# Patient Record
Sex: MALE | Race: ASIAN | NOT HISPANIC OR LATINO
[De-identification: names, ages, dates, MRNs, and addresses within clinical notes are randomized per-mention and may not be internally consistent; named-entity substitution may affect disease eponyms.]

---

## 2022-04-29 ENCOUNTER — RESULT REVIEW (OUTPATIENT)
Age: 67
End: 2022-04-29

## 2022-04-29 ENCOUNTER — OUTPATIENT (OUTPATIENT)
Dept: OUTPATIENT SERVICES | Facility: HOSPITAL | Age: 67
LOS: 1 days | End: 2022-04-29
Payer: MEDICARE

## 2022-04-29 ENCOUNTER — APPOINTMENT (OUTPATIENT)
Dept: ULTRASOUND IMAGING | Facility: CLINIC | Age: 67
End: 2022-04-29
Payer: MEDICARE

## 2022-04-29 DIAGNOSIS — Z00.8 ENCOUNTER FOR OTHER GENERAL EXAMINATION: ICD-10-CM

## 2022-04-29 PROCEDURE — 76700 US EXAM ABDOM COMPLETE: CPT

## 2022-04-29 PROCEDURE — 76700 US EXAM ABDOM COMPLETE: CPT | Mod: 26

## 2022-06-12 ENCOUNTER — EMERGENCY (EMERGENCY)
Facility: HOSPITAL | Age: 67
LOS: 1 days | Discharge: HOME | End: 2022-06-12
Attending: EMERGENCY MEDICINE | Admitting: EMERGENCY MEDICINE
Payer: COMMERCIAL

## 2022-06-12 VITALS
TEMPERATURE: 98 F | DIASTOLIC BLOOD PRESSURE: 76 MMHG | WEIGHT: 143.08 LBS | SYSTOLIC BLOOD PRESSURE: 130 MMHG | RESPIRATION RATE: 18 BRPM | HEART RATE: 76 BPM

## 2022-06-12 VITALS
TEMPERATURE: 98 F | RESPIRATION RATE: 15 BRPM | SYSTOLIC BLOOD PRESSURE: 135 MMHG | DIASTOLIC BLOOD PRESSURE: 70 MMHG | HEART RATE: 73 BPM | OXYGEN SATURATION: 100 %

## 2022-06-12 DIAGNOSIS — K80.50 CALCULUS OF BILE DUCT WITHOUT CHOLANGITIS OR CHOLECYSTITIS WITHOUT OBSTRUCTION: ICD-10-CM

## 2022-06-12 LAB
ALBUMIN SERPL ELPH-MCNC: 3.9 G/DL — SIGNIFICANT CHANGE UP (ref 3.3–5)
ALP SERPL-CCNC: 87 U/L — SIGNIFICANT CHANGE UP (ref 40–120)
ALT FLD-CCNC: 27 U/L — SIGNIFICANT CHANGE UP (ref 12–78)
ANION GAP SERPL CALC-SCNC: 9 MMOL/L — SIGNIFICANT CHANGE UP (ref 5–17)
APTT BLD: 30.8 SEC — SIGNIFICANT CHANGE UP (ref 27.5–35.5)
AST SERPL-CCNC: 24 U/L — SIGNIFICANT CHANGE UP (ref 15–37)
BASOPHILS # BLD AUTO: 0.04 K/UL — SIGNIFICANT CHANGE UP (ref 0–0.2)
BASOPHILS NFR BLD AUTO: 0.4 % — SIGNIFICANT CHANGE UP (ref 0–2)
BILIRUB SERPL-MCNC: 0.5 MG/DL — SIGNIFICANT CHANGE UP (ref 0.2–1.2)
BUN SERPL-MCNC: 14 MG/DL — SIGNIFICANT CHANGE UP (ref 7–23)
CALCIUM SERPL-MCNC: 9.3 MG/DL — SIGNIFICANT CHANGE UP (ref 8.5–10.1)
CHLORIDE SERPL-SCNC: 96 MMOL/L — SIGNIFICANT CHANGE UP (ref 96–108)
CO2 SERPL-SCNC: 26 MMOL/L — SIGNIFICANT CHANGE UP (ref 22–31)
CREAT SERPL-MCNC: 0.87 MG/DL — SIGNIFICANT CHANGE UP (ref 0.5–1.3)
EGFR: 96 ML/MIN/1.73M2 — SIGNIFICANT CHANGE UP
EOSINOPHIL # BLD AUTO: 0.02 K/UL — SIGNIFICANT CHANGE UP (ref 0–0.5)
EOSINOPHIL NFR BLD AUTO: 0.2 % — SIGNIFICANT CHANGE UP (ref 0–6)
GLUCOSE SERPL-MCNC: 147 MG/DL — HIGH (ref 70–99)
HCT VFR BLD CALC: 38.7 % — LOW (ref 39–50)
HGB BLD-MCNC: 13.9 G/DL — SIGNIFICANT CHANGE UP (ref 13–17)
IMM GRANULOCYTES NFR BLD AUTO: 0.4 % — SIGNIFICANT CHANGE UP (ref 0–1.5)
INR BLD: 1.14 RATIO — SIGNIFICANT CHANGE UP (ref 0.88–1.16)
LIDOCAIN IGE QN: 172 U/L — SIGNIFICANT CHANGE UP (ref 73–393)
LYMPHOCYTES # BLD AUTO: 1.03 K/UL — SIGNIFICANT CHANGE UP (ref 1–3.3)
LYMPHOCYTES # BLD AUTO: 10.2 % — LOW (ref 13–44)
MCHC RBC-ENTMCNC: 31.7 PG — SIGNIFICANT CHANGE UP (ref 27–34)
MCHC RBC-ENTMCNC: 35.9 GM/DL — SIGNIFICANT CHANGE UP (ref 32–36)
MCV RBC AUTO: 88.4 FL — SIGNIFICANT CHANGE UP (ref 80–100)
MONOCYTES # BLD AUTO: 0.42 K/UL — SIGNIFICANT CHANGE UP (ref 0–0.9)
MONOCYTES NFR BLD AUTO: 4.2 % — SIGNIFICANT CHANGE UP (ref 2–14)
NEUTROPHILS # BLD AUTO: 8.52 K/UL — HIGH (ref 1.8–7.4)
NEUTROPHILS NFR BLD AUTO: 84.6 % — HIGH (ref 43–77)
NRBC # BLD: 0 /100 WBCS — SIGNIFICANT CHANGE UP (ref 0–0)
PLATELET # BLD AUTO: 208 K/UL — SIGNIFICANT CHANGE UP (ref 150–400)
POTASSIUM SERPL-MCNC: 4 MMOL/L — SIGNIFICANT CHANGE UP (ref 3.5–5.3)
POTASSIUM SERPL-SCNC: 4 MMOL/L — SIGNIFICANT CHANGE UP (ref 3.5–5.3)
PROT SERPL-MCNC: 7.5 G/DL — SIGNIFICANT CHANGE UP (ref 6–8.3)
PROTHROM AB SERPL-ACNC: 13.3 SEC — SIGNIFICANT CHANGE UP (ref 10.5–13.4)
RBC # BLD: 4.38 M/UL — SIGNIFICANT CHANGE UP (ref 4.2–5.8)
RBC # FLD: 12.7 % — SIGNIFICANT CHANGE UP (ref 10.3–14.5)
SODIUM SERPL-SCNC: 131 MMOL/L — LOW (ref 135–145)
WBC # BLD: 10.07 K/UL — SIGNIFICANT CHANGE UP (ref 3.8–10.5)
WBC # FLD AUTO: 10.07 K/UL — SIGNIFICANT CHANGE UP (ref 3.8–10.5)

## 2022-06-12 PROCEDURE — 36415 COLL VENOUS BLD VENIPUNCTURE: CPT

## 2022-06-12 PROCEDURE — 29515 APPLICATION SHORT LEG SPLINT: CPT

## 2022-06-12 PROCEDURE — 96375 TX/PRO/DX INJ NEW DRUG ADDON: CPT

## 2022-06-12 PROCEDURE — 96376 TX/PRO/DX INJ SAME DRUG ADON: CPT

## 2022-06-12 PROCEDURE — 74176 CT ABD & PELVIS W/O CONTRAST: CPT | Mod: MA

## 2022-06-12 PROCEDURE — 83690 ASSAY OF LIPASE: CPT

## 2022-06-12 PROCEDURE — 96361 HYDRATE IV INFUSION ADD-ON: CPT

## 2022-06-12 PROCEDURE — 76705 ECHO EXAM OF ABDOMEN: CPT | Mod: 26

## 2022-06-12 PROCEDURE — 85610 PROTHROMBIN TIME: CPT

## 2022-06-12 PROCEDURE — 96374 THER/PROPH/DIAG INJ IV PUSH: CPT

## 2022-06-12 PROCEDURE — 86850 RBC ANTIBODY SCREEN: CPT

## 2022-06-12 PROCEDURE — 99285 EMERGENCY DEPT VISIT HI MDM: CPT | Mod: 25

## 2022-06-12 PROCEDURE — 76705 ECHO EXAM OF ABDOMEN: CPT

## 2022-06-12 PROCEDURE — 74176 CT ABD & PELVIS W/O CONTRAST: CPT | Mod: 26,MA

## 2022-06-12 PROCEDURE — 85025 COMPLETE CBC W/AUTO DIFF WBC: CPT

## 2022-06-12 PROCEDURE — 85730 THROMBOPLASTIN TIME PARTIAL: CPT

## 2022-06-12 PROCEDURE — 86900 BLOOD TYPING SEROLOGIC ABO: CPT

## 2022-06-12 PROCEDURE — 99284 EMERGENCY DEPT VISIT MOD MDM: CPT | Mod: 25

## 2022-06-12 PROCEDURE — 86901 BLOOD TYPING SEROLOGIC RH(D): CPT

## 2022-06-12 PROCEDURE — 80053 COMPREHEN METABOLIC PANEL: CPT

## 2022-06-12 RX ORDER — SODIUM CHLORIDE 9 MG/ML
1000 INJECTION INTRAMUSCULAR; INTRAVENOUS; SUBCUTANEOUS ONCE
Refills: 0 | Status: COMPLETED | OUTPATIENT
Start: 2022-06-12 | End: 2022-06-12

## 2022-06-12 RX ORDER — ONDANSETRON 8 MG/1
4 TABLET, FILM COATED ORAL ONCE
Refills: 0 | Status: COMPLETED | OUTPATIENT
Start: 2022-06-12 | End: 2022-06-12

## 2022-06-12 RX ORDER — MORPHINE SULFATE 50 MG/1
4 CAPSULE, EXTENDED RELEASE ORAL ONCE
Refills: 0 | Status: DISCONTINUED | OUTPATIENT
Start: 2022-06-12 | End: 2022-06-12

## 2022-06-12 RX ORDER — FAMOTIDINE 10 MG/ML
20 INJECTION INTRAVENOUS ONCE
Refills: 0 | Status: COMPLETED | OUTPATIENT
Start: 2022-06-12 | End: 2022-06-12

## 2022-06-12 RX ORDER — LIDOCAINE 4 G/100G
10 CREAM TOPICAL ONCE
Refills: 0 | Status: COMPLETED | OUTPATIENT
Start: 2022-06-12 | End: 2022-06-12

## 2022-06-12 RX ADMIN — SODIUM CHLORIDE 1000 MILLILITER(S): 9 INJECTION INTRAMUSCULAR; INTRAVENOUS; SUBCUTANEOUS at 22:15

## 2022-06-12 RX ADMIN — LIDOCAINE 10 MILLILITER(S): 4 CREAM TOPICAL at 20:22

## 2022-06-12 RX ADMIN — FAMOTIDINE 20 MILLIGRAM(S): 10 INJECTION INTRAVENOUS at 20:22

## 2022-06-12 RX ADMIN — ONDANSETRON 4 MILLIGRAM(S): 8 TABLET, FILM COATED ORAL at 18:52

## 2022-06-12 RX ADMIN — SODIUM CHLORIDE 1000 MILLILITER(S): 9 INJECTION INTRAMUSCULAR; INTRAVENOUS; SUBCUTANEOUS at 20:23

## 2022-06-12 RX ADMIN — SODIUM CHLORIDE 1000 MILLILITER(S): 9 INJECTION INTRAMUSCULAR; INTRAVENOUS; SUBCUTANEOUS at 18:55

## 2022-06-12 RX ADMIN — MORPHINE SULFATE 4 MILLIGRAM(S): 50 CAPSULE, EXTENDED RELEASE ORAL at 21:33

## 2022-06-12 RX ADMIN — MORPHINE SULFATE 4 MILLIGRAM(S): 50 CAPSULE, EXTENDED RELEASE ORAL at 18:52

## 2022-06-12 RX ADMIN — Medication 30 MILLILITER(S): at 20:23

## 2022-06-12 NOTE — ED ADULT TRIAGE NOTE - CHIEF COMPLAINT QUOTE
pt ambulatory to triage A&Ox4 with complains of mid abdominal pain since 1400 - reports hx of gallstones. +nausea/vomiting

## 2022-06-12 NOTE — ED ADULT NURSE REASSESSMENT NOTE - NS ED NURSE REASSESS COMMENT FT1
2029- Pt medicated as ordered for pain. pt awaiting results. safety measures maintained. will continue to monitor. DEMETRIS, RN

## 2022-06-12 NOTE — ED PROVIDER NOTE - ATTENDING APP SHARED VISIT CONTRIBUTION OF CARE
Exam revealed a thin [place] male in mild distress with marked tenderness to palpation epigastric region and RUQ. I agree with plan and management outlined by PA.

## 2022-06-12 NOTE — ED PROVIDER NOTE - NSFOLLOWUPINSTRUCTIONS_ED_ALL_ED_FT
Follow up with Surgery and Gastroenterology. Return for fever, vomiting, yellow discoloration, increasing abdominal pain.     Biliary Colic, Adult       Biliary colic is severe pain caused by a problem with the gallbladder. The gallbladder is a small organ in the upper right part of the abdomen. The gallbladder stores a digestive fluid produced in the liver (bile) that helps the body break down fat. Bile and other digestive enzymes are carried from the liver to the small intestine through tube-like structures called bile ducts. The gallbladder and the bile ducts form the biliary tract.    Sometimes, hard deposits of digestive fluids (gallstones) form in the gallbladder and block the flow of bile from the gallbladder, causing biliary colic. This condition is also called a gallbladder attack. Gallstones can be as small as a grain of sand or as big as a golf ball. There could be just one gallstone in the gallbladder, or there could be many.      What are the causes?    This condition is usually caused by gallstones. Less often, a tumor could block the flow of bile from the gallbladder and trigger biliary colic.      What increases the risk?    The following factors may make you more likely to develop this condition:  •Being female.      •Having a family history of gallstones.      •Being obese.      •Losing weight suddenly or quickly.      •Eating a diet that is high in calories, low in fiber, and rich in refined carbohydrates, such as white bread and white rice.    •Having certain health conditions, such as:  •An intestinal disease that affects nutrient absorption, such as Crohn's disease.      •A metabolic condition, such as diabetes or metabolic syndrome. Metabolic syndrome occurs when someone has high blood pressure, high cholesterol, and diabetes.      •A blood condition, such as hemolytic anemia or sickle cell disease.          What are the signs or symptoms?    The main symptom of this condition is severe pain in the upper right side of the abdomen. You may feel this pain below the chest but above the hip. This pain often occurs at night or after eating a meal that is high in fat. This pain may get worse for up to an hour and last as long as 12 hours. In most cases, the pain fades (subsides) within 2 hours.    Other symptoms of this condition include:  •Nausea and vomiting.      •Pain under the right shoulder.        How is this diagnosed?    This condition is diagnosed based on your medical history, your symptoms, and a physical exam.    You may also have tests, including:  •Blood tests to rule out infection or inflammation of the bile ducts, gallbladder, pancreas, or liver.    •Imaging studies, such as:  •An ultrasound.      •A CT scan.      •An MRI.        In some cases, you may need to have an imaging study done using a small amount of radioactive material (nuclear medicine) to confirm the diagnosis.      How is this treated?    This condition may be treated with medicines to:  •Relieve your pain or nausea.      •Dissolve the gallstones. It may take months or years before the gallstones are completely gone.      If you have gallstones, or if you have a tumor in the gallbladder that is causing biliary colic, you may need surgery to remove the gallbladder (cholecystectomy).      Follow these instructions at home:    Eating and drinking     •Drink enough fluid to keep your urine pale yellow.    •Follow instructions from your health care provider about eating or drinking restrictions. These may include avoiding:  •Fatty, greasy, and fried foods.      •Any foods that make the pain worse.      •Overeating.      •Having a large meal after not eating for a while.        General instructions     •Take over-the-counter and prescription medicines only as told by your health care provider.      •Keep all follow-up visits as told by your health care provider. This is important.        How is this prevented?    Steps to prevent this condition include:  •Maintaining a healthy body weight.      •Getting regular exercise.      •Eating a healthy diet that is high in fiber and low in fat.      •Limiting how much sugar and refined carbohydrates you eat.        Contact a health care provider if:    •Your pain lasts more than 5 hours.      •You vomit.      •You have a fever and chills.      •Your pain gets worse.        Get help right away if:    •Your skin or the whites of your eyes look yellow (jaundice).      •Your have tea-colored urine and light-colored stools (feces).      •You are dizzy or you faint.        Summary    •Biliary colic is severe pain caused by a problem with the gallbladder. The gallbladder is a small organ in the upper right part of your abdomen.      •Treatment for this condition may include medicine to relieve your pain or nausea, or medicine to slowly dissolve the gallstones.      •If you have gallstones, or if you have a tumor in the gallbladder that is causing biliary colic, you may need surgery to remove the gallbladder (cholecystectomy).      This information is not intended to replace advice given to you by your health care provider. Make sure you discuss any questions you have with your health care provider.

## 2022-06-12 NOTE — ED PROVIDER NOTE - PROGRESS NOTE DETAILS
Surgery advised dc. Reevaluated patient at bedside. Patient notes improvement of symptoms. Discussed results with the patient and provided copies.  All questions were answered. Discussed the importance of prompt, close medical follow-up. Patient will return with any changes, concerns or persistent/worsening symptoms.  Patient verbalized understanding.

## 2022-06-12 NOTE — ED PROVIDER NOTE - PROVIDER TOKENS
PROVIDER:[TOKEN:[[id number]],FOLLOWUP:[1-3 Days]],PROVIDER:[TOKEN:[[id number]],FOLLOWUP:[1-3 Days]]

## 2022-06-12 NOTE — ED PROVIDER NOTE - CLINICAL SUMMARY MEDICAL DECISION MAKING FREE TEXT BOX
66 yo male with Cholelithiasis, ERCP with stone removal from CBD in [place] last year, well until this afternoon when he developed epigastric abdominal pains, non radiating with nausea and vomiting. No hematemesis, melena or BRBPR and no fever or chills. This case will require evaluation, labs, imaging as well as IVFs and meds.

## 2022-06-12 NOTE — CONSULT NOTE ADULT - SUBJECTIVE AND OBJECTIVE BOX
Surgery Consult Note:    CC: Patient is a 67y old  Male who presents with a chief complaint of abdominal pain.    HPI From ED: 68 y/o male with PMHx HTN presents today due to epigastric abdominal pain x 4 hours. pt describes pain as burning, non-radiating, and currently 6/10. pt reports he was in [place] in which was diagnosed with gallstones and a stone in the CBD. pt followed up with Dr. Pardo. pt reports vomiting about 5 times. pt denies fever, melena, cp, sob, diarrhea, alcohol use or any other complaints.    Interval HPI: HPI as written above, pt states his abdominal pain began around 2 pm today. He experienced the same pain in September (was in [place] at the time) - where he was told he had gallstones, a dilated common bile duct, and underwent an ERCP. Since being back in [place], the pt was seeing Dr. Cruz who told him to follow a low fat diet. Admits to chills, nausea, vomiting (x7 - food contents, no hematemesis), last BM was 4 am today. Denies weakness, fatigue, recent illness, shortness of breath, chest pain, bloating, changes in bowel habits, or urinary sx.    Past Medical & Surgical History:  HTN (hypertension)    ROS:  General: admits to chills, denies fatigue/weakness, dizziness, night sweats, weight loss  HEENT: denies auditory or visual changes/disturbances, no vertigo, throat pain or dysphagia  Respiratory: denies shortness of breath, wheezing, dyspnea; denies cough or hemoptysis  Cardiac: denies chest pain, palpitations  GI: admits to epigastric pain, no nausea, vomiting, hematemesis or heartburn; denies changes in bowel habits, no hematochezia or melena  : denies urinary urgency/frequency, denies dysuria or hematuria; no incontinence or urinary retention  Neuro: denies headache, syncope, paraesthesias, paralysis or tremors  Extremities: denies pain/swelling, arthralgias or weakness; no limited ROM, denies discoloration of bilateral UE/LE  Skin: denies pruritus, rashes  Psych: denies hallucinations, visual disturbances    Medications:  Home Medications: pantoprazole, telmisartan 20 mg  MEDICATIONS  (STANDING):  MEDICATIONS  (PRN):    Allergies:  ALLERGIES: No Known Allergies  INTOLERANCES: None, unless indicated below    Social History:  Smoking: Yes [ ]  No [x]   ______ pack years  EtOH: Yes [ ]  No [x]  Social [ ]  Drugs: Yes [ ]  No [x]  _______    Family History:  Father - HTN    Vitals:  Vital Signs Last 24 Hrs  T(C): 36.7 (12 Jun 2022 22:33), Max: 36.7 (12 Jun 2022 22:33)  T(F): 98 (12 Jun 2022 22:33), Max: 98 (12 Jun 2022 22:33)  HR: 73 (12 Jun 2022 22:33) (68 - 76)  BP: 135/70 (12 Jun 2022 22:33) (108/58 - 135/70)  RR: 15 (12 Jun 2022 22:33) (15 - 18)  SpO2: 100% (12 Jun 2022 22:33) (98% - 100%)    Physical Exam:  General: no acute distress, appears comfortable, well nourished, well-groomed  HEENT: normocephalic/atraumatic, vision grossly intact, hearing grossly intact, no nasal discharge, ears & nose symmetrical and atraumatic  Neck: supple  Chest: lungs clear to auscultation bilaterally, good inspiratory effort  Heart: heart rate and rhythm regular  Abdomen: soft, epigastric tenderness, negative Granados's sign, nondistended, bowel sounds present; no guarding, rebound tenderness, or peritoneal signs; no visible or palpable mass  Extremities: no gross deformities, able to move all four extremities, peripheral pulses intact, no edema, negative Franki's sign  Neuro: alert & oriented x3, motor and sensory grossly intact  Skin: warm, dry, good turgor; no gross lesions, no eruptions, rashes or jaundice    Labs:                        13.9   10.07 )-----------( 208      ( 12 Jun 2022 18:48 )             38.7     06-12    131<L>  |  96  |  14  ----------------------------<  147<H>  4.0   |  26  |  0.87    Ca    9.3      12 Jun 2022 18:48    TPro  7.5  /  Alb  3.9  /  TBili  0.5  /  DBili  x   /  AST  24  /  ALT  27  /  AlkPhos  87  06-12    PT/INR - ( 12 Jun 2022 18:48 )   PT: 13.3 sec;   INR: 1.14 ratio    PTT - ( 12 Jun 2022 18:48 )  PTT:30.8 sec    LIVER FUNCTIONS - ( 12 Jun 2022 18:48 )  Alb: 3.9 g/dL / Pro: 7.5 g/dL / ALK PHOS: 87 U/L / ALT: 27 U/L / AST: 24 U/L / GGT: x           Radiology & Additional Studies:  < from: CT Abdomen and Pelvis No Cont (06.12.22 @ 19:13) >  LIVER: Within normal limits.  BILE DUCTS: Mild intrahepatic and extrahepatic biliary ductal dilatation.   The common bile duct measures 1 cm with distal tapering. No radiopaque   CBD stone.  GALLBLADDER: Cholelithiasis.  SPLEEN: Within normal limits.  PANCREAS: Within normal limits.  ADRENALS: Within normal limits.  KIDNEYS/URETERS: Left renal cyst. No renal calculi or hydronephrosis.    BLADDER: Within normal limits.  REPRODUCTIVE ORGANS: Prostate is enlarged.    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 Colonic diverticulosis   without evidence of diverticulitis.No bowel obstruction. Appendix is   normal.  PERITONEUM: No ascites or pneumoperitoneum.  VESSELS: Atherosclerotic changes.  RETROPERITONEUM/LYMPH NODES: No lymphadenopathy.  ABDOMINAL WALL: Small fat-containing umbilical hernia.  BONES: Degenerativechanges.    IMPRESSION:  Mild biliary ductal dilatation, as above, of unknown etiology. Correlate   with LFTs and MRCP as indicated.    Large volume stool burden in the colon and distal small bowel.  --- End of Report ---  PAUL ALEJANDRO MD; Attending Radiologist  This document has been electronically signed. Jun 12 2022  7:45PM    < end of copied text >    < from: US Abdomen Upper Quadrant Right (06.12.22 @ 19:57) >  FINDINGS:  Liver: Normal in size and morphology. Echogenic 1.2 cm right hepatic   nodule is unchanged since prior ultrasound and nonspecific.  Bile ducts: No gross intrahepatic biliary duct dilatation as imaged.The   proximal common bile duct is mildly dilated, and measures approximately 7   mm. However the distal duct is not visualized.  Gallbladder: Cholelithiasis. No gallbladder wall thickening or   pericholecystic edema.  Pancreas: Very limited dueto bowel gas.  Right kidney: 10.1 cm. No hydronephrosis.  Ascites: None.  IVC: Visualized portions are within normal limits.    IMPRESSION:  Cholelithiasis without evidence of cholecystitis. Nonspecific echogenic   right hepatic nodule is unchanged since 4/29/2022. This could represent a   hemangioma but should be confirmed with MRI.    Known biliary ductal dilatation is better visualized on CT. The distal   CBD is not imaged. Correlate with MRCP if clinically indicated.    --- End of Report ---  PAUL ALEJANDRO MD; Attending Radiologist  This document has been electronically signed. Jun 12 2022  8:06PM    < end of copied text >

## 2022-06-12 NOTE — CONSULT NOTE ADULT - PROBLEM SELECTOR RECOMMENDATION 9
- No WBC count, LFTs remain normal, pt afebrile & negative Granados's pt tenderness  - No surgical intervention at this time  - Spoke with pt, discussed lowfat dietary options  - Recommending that he f/u with Dr. Cruz outpt  - Discussed with Dr. Palacios

## 2022-06-12 NOTE — ED PROVIDER NOTE - PHYSICAL EXAMINATION

## 2022-06-12 NOTE — ED PROVIDER NOTE - OBJECTIVE STATEMENT
66 y/o male with PMHx HTN presents today due to epigastric abdominal pain x 4 hours. pt describes pain as burning, non-radiating, and currently 6/10. pt reports he was in [place] in which was diagnosed with gallstones and a stone in the CBD. pt followed up with Dr. Pardo. pt reports vomiting about 5 times. pt denies fever, melena, cp, sob, diarrhea, alcohol use or any other complaints

## 2022-06-12 NOTE — ED PROVIDER NOTE - CARE PROVIDER_API CALL
Dennis Cruz ()  Internal Medicine  [address] [place] [address]  Phone: [phone]  Fax: [phone]  Follow Up Time: 1-3 Days    Sam Palacios)  ColonRectal Surgery; Surgery  [address] [place] [address]  Phone: [phone]  Fax: [phone]  Follow Up Time: 1-3 Days

## 2022-06-12 NOTE — ED PROVIDER NOTE - NS ED ATTENDING STATEMENT MOD
This was a shared visit with the KYA. I reviewed and verified the documentation and independently performed the documented:

## 2022-06-12 NOTE — ED PROVIDER NOTE - PATIENT PORTAL LINK FT
You can access the FollowMyHealth Patient Portal offered by [facility] by registering at the following website: [email or web address]. By joining [employer]’s FollowMyHealth portal, you will also be able to view your health information using other applications (apps) compatible with our system.

## 2022-06-13 ENCOUNTER — INPATIENT (INPATIENT)
Facility: HOSPITAL | Age: 67
LOS: 3 days | Discharge: HOME | DRG: 871 | End: 2022-06-17
Attending: INTERNAL MEDICINE | Admitting: INTERNAL MEDICINE
Payer: MEDICARE

## 2022-06-13 VITALS
RESPIRATION RATE: 18 BRPM | OXYGEN SATURATION: 99 % | HEIGHT: 67 IN | HEART RATE: 76 BPM | SYSTOLIC BLOOD PRESSURE: 112 MMHG | TEMPERATURE: 98 F | WEIGHT: 143.08 LBS | DIASTOLIC BLOOD PRESSURE: 74 MMHG

## 2022-06-13 DIAGNOSIS — I10 ESSENTIAL (PRIMARY) HYPERTENSION: ICD-10-CM

## 2022-06-13 DIAGNOSIS — A41.89 OTHER SPECIFIED SEPSIS: ICD-10-CM

## 2022-06-13 DIAGNOSIS — R50.9 FEVER, UNSPECIFIED: ICD-10-CM

## 2022-06-13 LAB
ALBUMIN SERPL ELPH-MCNC: 2.7 G/DL — LOW (ref 3.3–5)
ALBUMIN SERPL ELPH-MCNC: 3.2 G/DL — LOW (ref 3.3–5)
ALP SERPL-CCNC: 65 U/L — SIGNIFICANT CHANGE UP (ref 40–120)
ALP SERPL-CCNC: 84 U/L — SIGNIFICANT CHANGE UP (ref 40–120)
ALT FLD-CCNC: 26 U/L — SIGNIFICANT CHANGE UP (ref 12–78)
ALT FLD-CCNC: 26 U/L — SIGNIFICANT CHANGE UP (ref 12–78)
ANION GAP SERPL CALC-SCNC: 12 MMOL/L — SIGNIFICANT CHANGE UP (ref 5–17)
ANION GAP SERPL CALC-SCNC: 6 MMOL/L — SIGNIFICANT CHANGE UP (ref 5–17)
APPEARANCE UR: CLEAR — SIGNIFICANT CHANGE UP
APTT BLD: 26.3 SEC — LOW (ref 27.5–35.5)
AST SERPL-CCNC: 19 U/L — SIGNIFICANT CHANGE UP (ref 15–37)
AST SERPL-CCNC: 25 U/L — SIGNIFICANT CHANGE UP (ref 15–37)
BACTERIA # UR AUTO: ABNORMAL
BASOPHILS # BLD AUTO: 0.01 K/UL — SIGNIFICANT CHANGE UP (ref 0–0.2)
BASOPHILS # BLD AUTO: 0.04 K/UL — SIGNIFICANT CHANGE UP (ref 0–0.2)
BASOPHILS NFR BLD AUTO: 0.1 % — SIGNIFICANT CHANGE UP (ref 0–2)
BASOPHILS NFR BLD AUTO: 0.2 % — SIGNIFICANT CHANGE UP (ref 0–2)
BILIRUB SERPL-MCNC: 0.7 MG/DL — SIGNIFICANT CHANGE UP (ref 0.2–1.2)
BILIRUB SERPL-MCNC: 1.1 MG/DL — SIGNIFICANT CHANGE UP (ref 0.2–1.2)
BILIRUB UR-MCNC: NEGATIVE — SIGNIFICANT CHANGE UP
BUN SERPL-MCNC: 10 MG/DL — SIGNIFICANT CHANGE UP (ref 7–23)
BUN SERPL-MCNC: 11 MG/DL — SIGNIFICANT CHANGE UP (ref 7–23)
CALCIUM SERPL-MCNC: 7.7 MG/DL — LOW (ref 8.5–10.1)
CALCIUM SERPL-MCNC: 8.1 MG/DL — LOW (ref 8.5–10.1)
CHLORIDE SERPL-SCNC: 108 MMOL/L — SIGNIFICANT CHANGE UP (ref 96–108)
CHLORIDE SERPL-SCNC: 98 MMOL/L — SIGNIFICANT CHANGE UP (ref 96–108)
CO2 SERPL-SCNC: 21 MMOL/L — LOW (ref 22–31)
CO2 SERPL-SCNC: 23 MMOL/L — SIGNIFICANT CHANGE UP (ref 22–31)
COLOR SPEC: YELLOW — SIGNIFICANT CHANGE UP
CREAT SERPL-MCNC: 0.85 MG/DL — SIGNIFICANT CHANGE UP (ref 0.5–1.3)
CREAT SERPL-MCNC: 0.88 MG/DL — SIGNIFICANT CHANGE UP (ref 0.5–1.3)
DIFF PNL FLD: ABNORMAL
EGFR: 95 ML/MIN/1.73M2 — SIGNIFICANT CHANGE UP
EGFR: 96 ML/MIN/1.73M2 — SIGNIFICANT CHANGE UP
EOSINOPHIL # BLD AUTO: 0 K/UL — SIGNIFICANT CHANGE UP (ref 0–0.5)
EOSINOPHIL # BLD AUTO: 0.01 K/UL — SIGNIFICANT CHANGE UP (ref 0–0.5)
EOSINOPHIL NFR BLD AUTO: 0 % — SIGNIFICANT CHANGE UP (ref 0–6)
EOSINOPHIL NFR BLD AUTO: 0.1 % — SIGNIFICANT CHANGE UP (ref 0–6)
EPI CELLS # UR: SIGNIFICANT CHANGE UP
GLUCOSE SERPL-MCNC: 106 MG/DL — HIGH (ref 70–99)
GLUCOSE SERPL-MCNC: 97 MG/DL — SIGNIFICANT CHANGE UP (ref 70–99)
GLUCOSE UR QL: NEGATIVE — SIGNIFICANT CHANGE UP
HCT VFR BLD CALC: 34.9 % — LOW (ref 39–50)
HCT VFR BLD CALC: 39.1 % — SIGNIFICANT CHANGE UP (ref 39–50)
HGB BLD-MCNC: 11.9 G/DL — LOW (ref 13–17)
HGB BLD-MCNC: 13.6 G/DL — SIGNIFICANT CHANGE UP (ref 13–17)
IMM GRANULOCYTES NFR BLD AUTO: 0.4 % — SIGNIFICANT CHANGE UP (ref 0–1.5)
IMM GRANULOCYTES NFR BLD AUTO: 0.6 % — SIGNIFICANT CHANGE UP (ref 0–1.5)
INR BLD: 1.25 RATIO — HIGH (ref 0.88–1.16)
KETONES UR-MCNC: NEGATIVE — SIGNIFICANT CHANGE UP
LACTATE SERPL-SCNC: 1.6 MMOL/L — SIGNIFICANT CHANGE UP (ref 0.7–2)
LACTATE SERPL-SCNC: 4.5 MMOL/L — CRITICAL HIGH (ref 0.7–2)
LEUKOCYTE ESTERASE UR-ACNC: NEGATIVE — SIGNIFICANT CHANGE UP
LIDOCAIN IGE QN: 136 U/L — SIGNIFICANT CHANGE UP (ref 73–393)
LYMPHOCYTES # BLD AUTO: 0.38 K/UL — LOW (ref 1–3.3)
LYMPHOCYTES # BLD AUTO: 0.84 K/UL — LOW (ref 1–3.3)
LYMPHOCYTES # BLD AUTO: 4.4 % — LOW (ref 13–44)
LYMPHOCYTES # BLD AUTO: 4.8 % — LOW (ref 13–44)
MAGNESIUM SERPL-MCNC: 1.9 MG/DL — SIGNIFICANT CHANGE UP (ref 1.6–2.6)
MCHC RBC-ENTMCNC: 29.3 PG — SIGNIFICANT CHANGE UP (ref 27–34)
MCHC RBC-ENTMCNC: 30.2 PG — SIGNIFICANT CHANGE UP (ref 27–34)
MCHC RBC-ENTMCNC: 34.1 GM/DL — SIGNIFICANT CHANGE UP (ref 32–36)
MCHC RBC-ENTMCNC: 34.8 GM/DL — SIGNIFICANT CHANGE UP (ref 32–36)
MCV RBC AUTO: 84.3 FL — SIGNIFICANT CHANGE UP (ref 80–100)
MCV RBC AUTO: 88.6 FL — SIGNIFICANT CHANGE UP (ref 80–100)
MONOCYTES # BLD AUTO: 0.04 K/UL — SIGNIFICANT CHANGE UP (ref 0–0.9)
MONOCYTES # BLD AUTO: 1 K/UL — HIGH (ref 0–0.9)
MONOCYTES NFR BLD AUTO: 0.5 % — LOW (ref 2–14)
MONOCYTES NFR BLD AUTO: 5.2 % — SIGNIFICANT CHANGE UP (ref 2–14)
NEUTROPHILS # BLD AUTO: 17.25 K/UL — HIGH (ref 1.8–7.4)
NEUTROPHILS # BLD AUTO: 7.54 K/UL — HIGH (ref 1.8–7.4)
NEUTROPHILS NFR BLD AUTO: 89.5 % — HIGH (ref 43–77)
NEUTROPHILS NFR BLD AUTO: 94.2 % — HIGH (ref 43–77)
NITRITE UR-MCNC: NEGATIVE — SIGNIFICANT CHANGE UP
NRBC # BLD: 0 /100 WBCS — SIGNIFICANT CHANGE UP (ref 0–0)
NRBC # BLD: 0 /100 WBCS — SIGNIFICANT CHANGE UP (ref 0–0)
PH UR: 7 — SIGNIFICANT CHANGE UP (ref 5–8)
PHOSPHATE SERPL-MCNC: 3.2 MG/DL — SIGNIFICANT CHANGE UP (ref 2.5–4.5)
PLATELET # BLD AUTO: 146 K/UL — LOW (ref 150–400)
PLATELET # BLD AUTO: 172 K/UL — SIGNIFICANT CHANGE UP (ref 150–400)
POTASSIUM SERPL-MCNC: 3.6 MMOL/L — SIGNIFICANT CHANGE UP (ref 3.5–5.3)
POTASSIUM SERPL-MCNC: 3.8 MMOL/L — SIGNIFICANT CHANGE UP (ref 3.5–5.3)
POTASSIUM SERPL-SCNC: 3.6 MMOL/L — SIGNIFICANT CHANGE UP (ref 3.5–5.3)
POTASSIUM SERPL-SCNC: 3.8 MMOL/L — SIGNIFICANT CHANGE UP (ref 3.5–5.3)
PROCALCITONIN SERPL-MCNC: <0.05 — SIGNIFICANT CHANGE UP (ref 0–0.04)
PROT SERPL-MCNC: 5.4 G/DL — LOW (ref 6–8.3)
PROT SERPL-MCNC: 6.7 G/DL — SIGNIFICANT CHANGE UP (ref 6–8.3)
PROT UR-MCNC: 30 MG/DL
PROTHROM AB SERPL-ACNC: 14.7 SEC — HIGH (ref 10.5–13.4)
RBC # BLD: 3.94 M/UL — LOW (ref 4.2–5.8)
RBC # BLD: 4.64 M/UL — SIGNIFICANT CHANGE UP (ref 4.2–5.8)
RBC # FLD: 11.9 % — SIGNIFICANT CHANGE UP (ref 10.3–14.5)
RBC # FLD: 12.2 % — SIGNIFICANT CHANGE UP (ref 10.3–14.5)
RBC CASTS # UR COMP ASSIST: >50 /HPF (ref 0–4)
SARS-COV-2 RNA SPEC QL NAA+PROBE: SIGNIFICANT CHANGE UP
SODIUM SERPL-SCNC: 131 MMOL/L — LOW (ref 135–145)
SODIUM SERPL-SCNC: 137 MMOL/L — SIGNIFICANT CHANGE UP (ref 135–145)
SP GR SPEC: 1.01 — SIGNIFICANT CHANGE UP (ref 1.01–1.02)
UROBILINOGEN FLD QL: NEGATIVE — SIGNIFICANT CHANGE UP
WBC # BLD: 19.26 K/UL — HIGH (ref 3.8–10.5)
WBC # BLD: 8 K/UL — SIGNIFICANT CHANGE UP (ref 3.8–10.5)
WBC # FLD AUTO: 19.26 K/UL — HIGH (ref 3.8–10.5)
WBC # FLD AUTO: 8 K/UL — SIGNIFICANT CHANGE UP (ref 3.8–10.5)
WBC UR QL: SIGNIFICANT CHANGE UP

## 2022-06-13 PROCEDURE — 99292 CRITICAL CARE ADDL 30 MIN: CPT

## 2022-06-13 PROCEDURE — 74177 CT ABD & PELVIS W/CONTRAST: CPT | Mod: 26

## 2022-06-13 PROCEDURE — 71045 X-RAY EXAM CHEST 1 VIEW: CPT | Mod: 26

## 2022-06-13 PROCEDURE — 76705 ECHO EXAM OF ABDOMEN: CPT | Mod: 26

## 2022-06-13 PROCEDURE — 99291 CRITICAL CARE FIRST HOUR: CPT

## 2022-06-13 RX ORDER — PANTOPRAZOLE SODIUM 20 MG/1
40 TABLET, DELAYED RELEASE ORAL DAILY
Refills: 0 | Status: DISCONTINUED | OUTPATIENT
Start: 2022-06-13 | End: 2022-06-17

## 2022-06-13 RX ORDER — ENOXAPARIN SODIUM 100 MG/ML
40 INJECTION SUBCUTANEOUS EVERY 24 HOURS
Refills: 0 | Status: DISCONTINUED | OUTPATIENT
Start: 2022-06-14 | End: 2022-06-14

## 2022-06-13 RX ORDER — SODIUM CHLORIDE 9 MG/ML
1000 INJECTION INTRAMUSCULAR; INTRAVENOUS; SUBCUTANEOUS ONCE
Refills: 0 | Status: DISCONTINUED | OUTPATIENT
Start: 2022-06-13 | End: 2022-06-13

## 2022-06-13 RX ORDER — SODIUM CHLORIDE 9 MG/ML
1000 INJECTION INTRAMUSCULAR; INTRAVENOUS; SUBCUTANEOUS ONCE
Refills: 0 | Status: COMPLETED | OUTPATIENT
Start: 2022-06-13 | End: 2022-06-13

## 2022-06-13 RX ORDER — MIDODRINE HYDROCHLORIDE 2.5 MG/1
10 TABLET ORAL EVERY 8 HOURS
Refills: 0 | Status: DISCONTINUED | OUTPATIENT
Start: 2022-06-13 | End: 2022-06-14

## 2022-06-13 RX ORDER — CHLORHEXIDINE GLUCONATE 213 G/1000ML
1 SOLUTION TOPICAL
Refills: 0 | Status: DISCONTINUED | OUTPATIENT
Start: 2022-06-13 | End: 2022-06-16

## 2022-06-13 RX ORDER — ONDANSETRON 8 MG/1
4 TABLET, FILM COATED ORAL ONCE
Refills: 0 | Status: COMPLETED | OUTPATIENT
Start: 2022-06-13 | End: 2022-06-13

## 2022-06-13 RX ORDER — MORPHINE SULFATE 50 MG/1
4 CAPSULE, EXTENDED RELEASE ORAL ONCE
Refills: 0 | Status: DISCONTINUED | OUTPATIENT
Start: 2022-06-13 | End: 2022-06-13

## 2022-06-13 RX ORDER — ACETAMINOPHEN 500 MG
650 TABLET ORAL EVERY 6 HOURS
Refills: 0 | Status: DISCONTINUED | OUTPATIENT
Start: 2022-06-13 | End: 2022-06-17

## 2022-06-13 RX ORDER — FAMOTIDINE 10 MG/ML
20 INJECTION INTRAVENOUS ONCE
Refills: 0 | Status: COMPLETED | OUTPATIENT
Start: 2022-06-13 | End: 2022-06-13

## 2022-06-13 RX ORDER — MIDODRINE HYDROCHLORIDE 2.5 MG/1
10 TABLET ORAL EVERY 8 HOURS
Refills: 0 | Status: DISCONTINUED | OUTPATIENT
Start: 2022-06-13 | End: 2022-06-13

## 2022-06-13 RX ORDER — PIPERACILLIN AND TAZOBACTAM 4; .5 G/20ML; G/20ML
3.38 INJECTION, POWDER, LYOPHILIZED, FOR SOLUTION INTRAVENOUS ONCE
Refills: 0 | Status: COMPLETED | OUTPATIENT
Start: 2022-06-13 | End: 2022-06-13

## 2022-06-13 RX ORDER — SODIUM CHLORIDE 9 MG/ML
1000 INJECTION INTRAMUSCULAR; INTRAVENOUS; SUBCUTANEOUS
Refills: 0 | Status: DISCONTINUED | OUTPATIENT
Start: 2022-06-13 | End: 2022-06-13

## 2022-06-13 RX ORDER — SODIUM CHLORIDE 9 MG/ML
1000 INJECTION, SOLUTION INTRAVENOUS
Refills: 0 | Status: DISCONTINUED | OUTPATIENT
Start: 2022-06-13 | End: 2022-06-17

## 2022-06-13 RX ORDER — NOREPINEPHRINE BITARTRATE/D5W 8 MG/250ML
0.05 PLASTIC BAG, INJECTION (ML) INTRAVENOUS
Qty: 8 | Refills: 0 | Status: DISCONTINUED | OUTPATIENT
Start: 2022-06-13 | End: 2022-06-14

## 2022-06-13 RX ORDER — ACETAMINOPHEN 500 MG
650 TABLET ORAL ONCE
Refills: 0 | Status: COMPLETED | OUTPATIENT
Start: 2022-06-13 | End: 2022-06-13

## 2022-06-13 RX ORDER — KETOROLAC TROMETHAMINE 30 MG/ML
15 SYRINGE (ML) INJECTION ONCE
Refills: 0 | Status: DISCONTINUED | OUTPATIENT
Start: 2022-06-13 | End: 2022-06-13

## 2022-06-13 RX ORDER — PIPERACILLIN AND TAZOBACTAM 4; .5 G/20ML; G/20ML
3.38 INJECTION, POWDER, LYOPHILIZED, FOR SOLUTION INTRAVENOUS EVERY 8 HOURS
Refills: 0 | Status: DISCONTINUED | OUTPATIENT
Start: 2022-06-13 | End: 2022-06-14

## 2022-06-13 RX ADMIN — Medication 650 MILLIGRAM(S): at 12:35

## 2022-06-13 RX ADMIN — ONDANSETRON 4 MILLIGRAM(S): 8 TABLET, FILM COATED ORAL at 12:04

## 2022-06-13 RX ADMIN — Medication 15 MILLIGRAM(S): at 17:26

## 2022-06-13 RX ADMIN — SODIUM CHLORIDE 1000 MILLILITER(S): 9 INJECTION INTRAMUSCULAR; INTRAVENOUS; SUBCUTANEOUS at 14:23

## 2022-06-13 RX ADMIN — Medication 15 MILLIGRAM(S): at 17:56

## 2022-06-13 RX ADMIN — SODIUM CHLORIDE 1000 MILLILITER(S): 9 INJECTION INTRAMUSCULAR; INTRAVENOUS; SUBCUTANEOUS at 13:03

## 2022-06-13 RX ADMIN — SODIUM CHLORIDE 150 MILLILITER(S): 9 INJECTION INTRAMUSCULAR; INTRAVENOUS; SUBCUTANEOUS at 17:27

## 2022-06-13 RX ADMIN — MIDODRINE HYDROCHLORIDE 10 MILLIGRAM(S): 2.5 TABLET ORAL at 21:26

## 2022-06-13 RX ADMIN — SODIUM CHLORIDE 1000 MILLILITER(S): 9 INJECTION INTRAMUSCULAR; INTRAVENOUS; SUBCUTANEOUS at 12:05

## 2022-06-13 RX ADMIN — PIPERACILLIN AND TAZOBACTAM 200 GRAM(S): 4; .5 INJECTION, POWDER, LYOPHILIZED, FOR SOLUTION INTRAVENOUS at 12:04

## 2022-06-13 RX ADMIN — PIPERACILLIN AND TAZOBACTAM 25 GRAM(S): 4; .5 INJECTION, POWDER, LYOPHILIZED, FOR SOLUTION INTRAVENOUS at 21:26

## 2022-06-13 RX ADMIN — FAMOTIDINE 20 MILLIGRAM(S): 10 INJECTION INTRAVENOUS at 12:05

## 2022-06-13 RX ADMIN — Medication 650 MILLIGRAM(S): at 12:05

## 2022-06-13 RX ADMIN — SODIUM CHLORIDE 1000 MILLILITER(S): 9 INJECTION INTRAMUSCULAR; INTRAVENOUS; SUBCUTANEOUS at 16:40

## 2022-06-13 NOTE — CONSULT NOTE ADULT - SUBJECTIVE AND OBJECTIVE BOX
Patient is a 67y old  Male who presents with a chief complaint of abd pain with fever (2022 15:39)    BRIEF HOSPITAL COURSE:   67y M PMHx HTN presented w/ abdominal pain, chills and vomiting this morning, was seen in ED yesterday for the same, He experienced the same pain in September (was in [place] at the time) - where he was told he had gallstones, a dilated common bile duct, and underwent an ERCP (states a stone was removed) Since being back in [place], the pt was seeing Dr. Cruz who told him to follow a low fat diet. In ED yesterday US and CT noted dilated CBD, and gallstones no cholecystitis and no LFT or bili abnormality, was seen by surgery, no intervention recommended, was told to f/u w/ Dr. Cruz, states he called Dr. Cruz office today as he continued to vomit and was told to go to ED.   Repeat CT scan performed w/ IV contrast today w/ "New pneumobilia, likely related to recent passage of a ductal stone. New periportal edema, perihepatic fluid, and gallbladder inflammation. Differential considerations include mild cholangitis and/or cholecystitis." Was noted to be febrile to 104.9 rectally earlier and had a lactate of 4.5 (which has now cleared s/p IVF).   Pt was started on Zosyn and given 4L IVF as well as mIVF, however this evening pt hypotensive w/ SBP 80s. MICU consulted for further management.   Pt seen and examined at bedside - denies HA/dizziness, CP, SOB, abdominal pain, N/V/D.     PAST MEDICAL & SURGICAL HISTORY:  HTN (hypertension)  No significant past surgical history    Review of Systems:  CONSTITUTIONAL: No fever, chills, or fatigue  EYES: No eye pain, visual disturbances, or discharge  ENMT:  No difficulty hearing, tinnitus, vertigo; No sinus or throat pain  NECK: No pain or stiffness  RESPIRATORY: No cough, wheezing, chills or hemoptysis; No shortness of breath  CARDIOVASCULAR: No chest pain, palpitations, dizziness, or leg swelling  GASTROINTESTINAL: No abdominal or epigastric pain. (+)nausea/vomiting, hematemesis; No diarrhea or constipation. No melena or hematochezia.  GENITOURINARY: No dysuria, frequency, hematuria, or incontinence  NEUROLOGICAL: No headaches, memory loss, loss of strength, numbness, or tremors  SKIN: No itching, burning, rashes, or lesions   MUSCULOSKELETAL: No joint pain or swelling; No muscle, back, or extremity pain  PSYCHIATRIC: No depression, anxiety, mood swings, or difficulty sleeping    Medications:  piperacillin/tazobactam IVPB.. 3.375 Gram(s) IV Intermittent every 8 hours  norepinephrine Infusion 0.05 MICROgram(s)/kG/Min IV Continuous <Continuous>  acetaminophen     Tablet .. 650 milliGRAM(s) Oral every 6 hours PRN  sodium chloride 0.9% Bolus 1000 milliLiter(s) IV Bolus once  sodium chloride 0.9%. 1000 milliLiter(s) IV Continuous <Continuous>  chlorhexidine 4% Liquid 1 Application(s) Topical <User Schedule>    ICU Vital Signs Last 24 Hrs  T(C): 37.3 (2022 19:40), Max: 40.5 (2022 10:45)  T(F): 99.1 (2022 19:40), Max: 104.9 (2022 10:45)  HR: 72 (2022 19:40) (71 - 87)  BP: 82/50 (2022 19:40) (82/50 - 135/70)  BP(mean): --  ABP: --  ABP(mean): --  RR: 16 (2022 19:40) (15 - 18)  SpO2: 97% (2022 19:40) (97% - 100%)    I&O's Detail  2022 07:01  -  2022 20:23  --------------------------------------------------------  IN:  Total IN: 0 mL  OUT:    Voided (mL): 900 mL  Total OUT: 900 mL  Total NET: -900 mL    LABS:                      13.6   8.00  )-----------( 172      ( 2022 12:30 )             39.1     06-13  131<L>  |  98  |  10  ----------------------------<  106<H>  3.6   |  21<L>  |  0.85  Ca    8.1<L>      2022 12:30  TPro  6.7  /  Alb  3.2<L>  /  TBili  1.1  /  DBili  x   /  AST  19  /  ALT  26  /  AlkPhos  84  06-13    CAPILLARY BLOOD GLUCOSE    PT/INR - ( 2022 12:30 )   PT: 14.7 sec;   INR: 1.25 ratio     PTT - ( 2022 12:30 )  PTT:26.3 sec    Urinalysis Basic - ( 2022 14:29 )  Color: Yellow / Appearance: Clear / S.010 / pH: x  Gluc: x / Ketone: Negative  / Bili: Negative / Urobili: Negative   Blood: x / Protein: 30 mg/dL / Nitrite: Negative   Leuk Esterase: Negative / RBC: >50 /HPF / WBC 0-2   Sq Epi: x / Non Sq Epi: Occasional / Bacteria: Occasional    CULTURES:    Physical Examination:  General: Alert, oriented, interactive, nonfocal  HEENT: PERRL.  NECK: Supple.   PULM: Clear to auscultation bilaterally.  CVS: s1/s2.  ABD: Soft, nondistended, nontender, normoactive bowel sounds.  EXT: No edema, nontender.  SKIN: Warm.    RADIOLOGY:   < from: CT Abdomen and Pelvis w/ IV Cont (22 @ 18:03) >  ACC: [id number] EXAM:  CT ABDOMEN AND PELVIS IC                        PROCEDURE DATE:  2022    IMPRESSION: New pneumobilia, likely related to recent passage of a ductal   stone. New periportal edema, perihepatic fluid, and gallbladder   inflammation. Differential considerations include mild cholangitis and/or   cholecystitis. Results discussed with Dr. Truong in the emergency   department at time of interpretation.      67y M PMHx HTN presented w/ abdominal pain, chills and vomiting this morning, was seen in ED yesterday for the same, He experienced the same pain in September (was in [place] at the time) - where he was told he had gallstones, a dilated common bile duct, and underwent an ERCP (states a stone was removed) Since being back in [place], the pt was seeing Dr. Cruz who told him to follow a low fat diet. In ED yesterday US and CT noted dilated CBD, and gallstones no cholecystitis and no LFT or bili abnormality, was seen by surgery, no intervention recommended, was told to f/u w/ Dr. Cruz, states he called Dr. Cruz office today as he continued to vomit and was told to go to ED.   Repeat CT scan performed w/ IV contrast today w/ "New pneumobilia, likely related to recent passage of a ductal stone. New periportal edema, perihepatic fluid, and gallbladder inflammation. Differential considerations include mild cholangitis and/or cholecystitis." Was noted to be febrile to 104.9 rectally earlier and had a lactate of 4.5 (which has now cleared s/p IVF).   Pt was started on Zosyn and given 4L IVF as well as mIVF, however this evening pt hypotensive w/ SBP 80s. MICU consulted for further management.   Assessment:  1. Septic Shock  2. Cholangitis vs. Cholecystitis    Plan:  NEURO:  -Analgesia PRN.     CV:  -Start Levophed infusion, actively titrating monitoring end points of organ perfusion. Start Midodrine to facilitate weaning off vasopressors.   -Keep K~4 and Mg>2 for optimal arrhythmia suppression.    RESP:  -No acute issues, satting well on RA.     RENAL:  -Renal function currently WNL.  -trend lytes/Scr daily with BMP  -I's and O's, goal UOP 0.5 cc/kg/hr  -renal dose meds and avoid nephrotoxins     GI:  -NPO.  -Protonix QD.   -Surgery and GI both following. Spoke w/ Surgery PA who has been in contact w/ Dr. Baum. Dr. Baum believes patient passed sludge or small stone through CBD causing bacteremia, fever, CBD was 1 cm last night and 6mm now. Possible ERCP vs. Cholecystectomy, awaiting recs. No WBC, LFTs/Bili WNL, no current abdominal pain.     ENDO:  -Aggressive glycemic control to limit FS glucose to <180mg/dl. BS WNL.    ID:  -Febrile, no leukocytosis. Lactate cleared s/p IVF. BloodCx, UrineCx sent. Empiric Zosyn.     HEME:  -DVT ppx w/ Lovenox.     DISPO: D/w eICU attending Dr. Arndt. Will admit to MICU.    CRITICAL CARE TIME SPENT:  45 minutes of critical care time spent providing medical care for patient's acute illness/conditions that impairs at least one vital organ system and/or poses a high risk of imminent or life threatening deterioration in the patient's condition. It includes time spent evaluating and treating the patient's acute illness as well as time spent reviewing labs, radiology, discussing goals of care with patient and/or patient's family, and discussing the case with a multidisciplinary team, including the eICU, in an effort to prevent further life threatening deterioration or end organ damage. This time is independent of any procedures performed.    Patient is a 67y old  Male who presents with a chief complaint of abd pain with fever (2022 15:39)    BRIEF HOSPITAL COURSE:   67y M PMHx HTN presented w/ abdominal pain, chills and vomiting this morning, was seen in ED yesterday for the same, He experienced the same pain in September (was in [place] at the time) - where he was told he had gallstones, a dilated common bile duct, and underwent an ERCP (states a stone was removed) Since being back in [place], the pt was seeing Dr. Cruz who told him to follow a low fat diet. In ED yesterday US and CT noted dilated CBD, and gallstones no cholecystitis and no LFT or bili abnormality, was seen by surgery, no intervention recommended, was told to f/u w/ Dr. Cruz, states he called Dr. Cruz office today as he continued to vomit and was told to go to ED.   Repeat CT scan performed w/ IV contrast today w/ "New pneumobilia, likely related to recent passage of a ductal stone. New periportal edema, perihepatic fluid, and gallbladder inflammation. Differential considerations include mild cholangitis and/or cholecystitis." Was noted to be febrile to 104.9 rectally earlier and had a lactate of 4.5 (which has now cleared s/p IVF).   Pt was started on Zosyn and given 4L IVF as well as mIVF, however this evening pt hypotensive w/ SBP 80s. MICU consulted for further management.   Pt seen and examined at bedside - denies HA/dizziness, CP, SOB, abdominal pain, N/V/D.     PAST MEDICAL & SURGICAL HISTORY:  HTN (hypertension)  No significant past surgical history    Review of Systems:  CONSTITUTIONAL: No fever, chills, or fatigue  EYES: No eye pain, visual disturbances, or discharge  ENMT:  No difficulty hearing, tinnitus, vertigo; No sinus or throat pain  NECK: No pain or stiffness  RESPIRATORY: No cough, wheezing, chills or hemoptysis; No shortness of breath  CARDIOVASCULAR: No chest pain, palpitations, dizziness, or leg swelling  GASTROINTESTINAL: (+) abdominal pain. (+)nausea/vomiting, no hematemesis; No diarrhea or constipation. No melena or hematochezia.  GENITOURINARY: No dysuria, frequency, hematuria, or incontinence  NEUROLOGICAL: No headaches, memory loss, loss of strength, numbness, or tremors  SKIN: No itching, burning, rashes, or lesions   MUSCULOSKELETAL: No joint pain or swelling; No muscle, back, or extremity pain  PSYCHIATRIC: No depression, anxiety, mood swings, or difficulty sleeping    Medications:  piperacillin/tazobactam IVPB.. 3.375 Gram(s) IV Intermittent every 8 hours  norepinephrine Infusion 0.05 MICROgram(s)/kG/Min IV Continuous <Continuous>  acetaminophen     Tablet .. 650 milliGRAM(s) Oral every 6 hours PRN  sodium chloride 0.9% Bolus 1000 milliLiter(s) IV Bolus once  sodium chloride 0.9%. 1000 milliLiter(s) IV Continuous <Continuous>  chlorhexidine 4% Liquid 1 Application(s) Topical <User Schedule>    ICU Vital Signs Last 24 Hrs  T(C): 37.3 (2022 19:40), Max: 40.5 (2022 10:45)  T(F): 99.1 (2022 19:40), Max: 104.9 (2022 10:45)  HR: 72 (2022 19:40) (71 - 87)  BP: 82/50 (2022 19:40) (82/50 - 135/70)  BP(mean): --  ABP: --  ABP(mean): --  RR: 16 (2022 19:40) (15 - 18)  SpO2: 97% (2022 19:40) (97% - 100%)    I&O's Detail  2022 07:01  -  2022 20:23  --------------------------------------------------------  IN:  Total IN: 0 mL  OUT:    Voided (mL): 900 mL  Total OUT: 900 mL  Total NET: -900 mL    LABS:                      13.6   8.00  )-----------( 172      ( 2022 12:30 )             39.1     06-13  131<L>  |  98  |  10  ----------------------------<  106<H>  3.6   |  21<L>  |  0.85  Ca    8.1<L>      2022 12:30  TPro  6.7  /  Alb  3.2<L>  /  TBili  1.1  /  DBili  x   /  AST  19  /  ALT  26  /  AlkPhos  84  06-13    CAPILLARY BLOOD GLUCOSE    PT/INR - ( 2022 12:30 )   PT: 14.7 sec;   INR: 1.25 ratio     PTT - ( 2022 12:30 )  PTT:26.3 sec    Urinalysis Basic - ( 2022 14:29 )  Color: Yellow / Appearance: Clear / S.010 / pH: x  Gluc: x / Ketone: Negative  / Bili: Negative / Urobili: Negative   Blood: x / Protein: 30 mg/dL / Nitrite: Negative   Leuk Esterase: Negative / RBC: >50 /HPF / WBC 0-2   Sq Epi: x / Non Sq Epi: Occasional / Bacteria: Occasional    CULTURES:    Physical Examination:  General: Alert, oriented, interactive, nonfocal  HEENT: PERRL.  NECK: Supple.   PULM: Clear to auscultation bilaterally.  CVS: s1/s2.  ABD: Soft, nondistended, nontender, normoactive bowel sounds.  EXT: No edema, nontender.  SKIN: Warm.    RADIOLOGY:   < from: CT Abdomen and Pelvis w/ IV Cont (22 @ 18:03) >  ACC: [id number] EXAM:  CT ABDOMEN AND PELVIS IC                        PROCEDURE DATE:  2022    IMPRESSION: New pneumobilia, likely related to recent passage of a ductal   stone. New periportal edema, perihepatic fluid, and gallbladder   inflammation. Differential considerations include mild cholangitis and/or   cholecystitis. Results discussed with Dr. Truong in the emergency   department at time of interpretation.      67y M PMHx HTN presented w/ abdominal pain, chills and vomiting this morning, was seen in ED yesterday for the same, He experienced the same pain in September (was in [place] at the time) - where he was told he had gallstones, a dilated common bile duct, and underwent an ERCP (states a stone was removed) Since being back in [place], the pt was seeing Dr. Cruz who told him to follow a low fat diet. In ED yesterday US and CT noted dilated CBD, and gallstones no cholecystitis and no LFT or bili abnormality, was seen by surgery, no intervention recommended, was told to f/u w/ Dr. Cruz, states he called Dr. Cruz office today as he continued to vomit and was told to go to ED.   Repeat CT scan performed w/ IV contrast today w/ "New pneumobilia, likely related to recent passage of a ductal stone. New periportal edema, perihepatic fluid, and gallbladder inflammation. Differential considerations include mild cholangitis and/or cholecystitis." Was noted to be febrile to 104.9 rectally earlier and had a lactate of 4.5 (which has now cleared s/p IVF).   Pt was started on Zosyn and given 4L IVF as well as mIVF, however this evening pt hypotensive w/ SBP 80s. MICU consulted for further management.   Assessment:  1. Septic Shock  2. Cholangitis vs. Cholecystitis    Plan:  NEURO:  -Analgesia PRN.     CV:  -Start Levophed infusion, actively titrating monitoring end points of organ perfusion. Start Midodrine to facilitate weaning off vasopressors.   -Keep K~4 and Mg>2 for optimal arrhythmia suppression.    RESP:  -No acute issues, satting well on RA.     RENAL:  -Renal function currently WNL.  -trend lytes/Scr daily with BMP  -I's and O's, goal UOP 0.5 cc/kg/hr  -renal dose meds and avoid nephrotoxins     GI:  -NPO.  -Protonix QD.   -Surgery and GI both following. Spoke w/ Surgery PA who has been in contact w/ Dr. Baum. Dr. Baum believes patient passed sludge or small stone through CBD causing bacteremia, fever, CBD was 1 cm last night and 6mm now. Possible ERCP vs. Cholecystectomy, awaiting recs. No WBC, LFTs/Bili WNL, no current abdominal pain.     ENDO:  -Aggressive glycemic control to limit FS glucose to <180mg/dl. BS WNL.    ID:  -Febrile, no leukocytosis. Lactate cleared s/p IVF. BloodCx, UrineCx sent. Empiric Zosyn.     HEME:  -DVT ppx w/ Lovenox.     DISPO: D/w eICU attending Dr. Arndt. Will admit to MICU.    CRITICAL CARE TIME SPENT:  45 minutes of critical care time spent providing medical care for patient's acute illness/conditions that impairs at least one vital organ system and/or poses a high risk of imminent or life threatening deterioration in the patient's condition. It includes time spent evaluating and treating the patient's acute illness as well as time spent reviewing labs, radiology, discussing goals of care with patient and/or patient's family, and discussing the case with a multidisciplinary team, including the eICU, in an effort to prevent further life threatening deterioration or end organ damage. This time is independent of any procedures performed.    Patient is a 67y old  Male who presents with a chief complaint of abd pain with fever (2022 15:39)    BRIEF HOSPITAL COURSE:   67y M PMHx HTN presented w/ abdominal pain, chills and vomiting this morning, was seen in ED yesterday for the same, He experienced the same pain in September (was in [place] at the time) - where he was told he had gallstones, a dilated common bile duct, and underwent an ERCP (states a stone was removed) Since being back in [place], the pt was seeing Dr. Cruz who told him to follow a low fat diet. In ED yesterday US and CT noted dilated CBD, and gallstones no cholecystitis and no LFT or bili abnormality, was seen by surgery, no intervention recommended, was told to f/u w/ Dr. Cruz, states he called Dr. Cruz office today as he continued to vomit and was told to go to ED.   Repeat CT scan performed w/ IV contrast today w/ "New pneumobilia, likely related to recent passage of a ductal stone. New periportal edema, perihepatic fluid, and gallbladder inflammation. Differential considerations include mild cholangitis and/or cholecystitis." Was noted to be febrile to 104.9 rectally earlier and had a lactate of 4.5 (which has now cleared s/p IVF).   Pt was started on Zosyn and given 4L IVF as well as mIVF, however this evening pt hypotensive w/ SBP 80s. MICU consulted for further management.   Pt seen and examined at bedside - denies HA/dizziness, CP, SOB, abdominal pain, N/V/D.     PAST MEDICAL & SURGICAL HISTORY:  HTN (hypertension)  No significant past surgical history    Review of Systems:  CONSTITUTIONAL: No fever, chills, or fatigue  EYES: No eye pain, visual disturbances, or discharge  ENMT:  No difficulty hearing, tinnitus, vertigo; No sinus or throat pain  NECK: No pain or stiffness  RESPIRATORY: No cough, wheezing, chills or hemoptysis; No shortness of breath  CARDIOVASCULAR: No chest pain, palpitations, dizziness, or leg swelling  GASTROINTESTINAL: (+) abdominal pain. (+)nausea/vomiting, no hematemesis; No diarrhea or constipation. No melena or hematochezia.  GENITOURINARY: No dysuria, frequency, hematuria, or incontinence  NEUROLOGICAL: No headaches, memory loss, loss of strength, numbness, or tremors  SKIN: No itching, burning, rashes, or lesions   MUSCULOSKELETAL: No joint pain or swelling; No muscle, back, or extremity pain  PSYCHIATRIC: No depression, anxiety, mood swings, or difficulty sleeping    Medications:  piperacillin/tazobactam IVPB.. 3.375 Gram(s) IV Intermittent every 8 hours  norepinephrine Infusion 0.05 MICROgram(s)/kG/Min IV Continuous <Continuous>  acetaminophen     Tablet .. 650 milliGRAM(s) Oral every 6 hours PRN  sodium chloride 0.9% Bolus 1000 milliLiter(s) IV Bolus once  sodium chloride 0.9%. 1000 milliLiter(s) IV Continuous <Continuous>  chlorhexidine 4% Liquid 1 Application(s) Topical <User Schedule>    ICU Vital Signs Last 24 Hrs  T(C): 37.3 (2022 19:40), Max: 40.5 (2022 10:45)  T(F): 99.1 (2022 19:40), Max: 104.9 (2022 10:45)  HR: 72 (2022 19:40) (71 - 87)  BP: 82/50 (2022 19:40) (82/50 - 135/70)  BP(mean): --  ABP: --  ABP(mean): --  RR: 16 (2022 19:40) (15 - 18)  SpO2: 97% (2022 19:40) (97% - 100%)    I&O's Detail  2022 07:01  -  2022 20:23  --------------------------------------------------------  IN:  Total IN: 0 mL  OUT:    Voided (mL): 900 mL  Total OUT: 900 mL  Total NET: -900 mL    LABS:                      13.6   8.00  )-----------( 172      ( 2022 12:30 )             39.1     06-13  131<L>  |  98  |  10  ----------------------------<  106<H>  3.6   |  21<L>  |  0.85  Ca    8.1<L>      2022 12:30  TPro  6.7  /  Alb  3.2<L>  /  TBili  1.1  /  DBili  x   /  AST  19  /  ALT  26  /  AlkPhos  84  06-13    CAPILLARY BLOOD GLUCOSE    PT/INR - ( 2022 12:30 )   PT: 14.7 sec;   INR: 1.25 ratio     PTT - ( 2022 12:30 )  PTT:26.3 sec    Urinalysis Basic - ( 2022 14:29 )  Color: Yellow / Appearance: Clear / S.010 / pH: x  Gluc: x / Ketone: Negative  / Bili: Negative / Urobili: Negative   Blood: x / Protein: 30 mg/dL / Nitrite: Negative   Leuk Esterase: Negative / RBC: >50 /HPF / WBC 0-2   Sq Epi: x / Non Sq Epi: Occasional / Bacteria: Occasional    CULTURES:    Physical Examination:  General: Alert, oriented, interactive, nonfocal  HEENT: PERRL.  NECK: Supple.   PULM: Clear to auscultation bilaterally.  CVS: s1/s2.  ABD: Soft, nondistended, nontender, normoactive bowel sounds.  EXT: No edema, nontender.  SKIN: Warm.    RADIOLOGY:   < from: CT Abdomen and Pelvis w/ IV Cont (22 @ 18:03) >  ACC: [id number] EXAM:  CT ABDOMEN AND PELVIS IC                        PROCEDURE DATE:  2022    IMPRESSION: New pneumobilia, likely related to recent passage of a ductal   stone. New periportal edema, perihepatic fluid, and gallbladder   inflammation. Differential considerations include mild cholangitis and/or   cholecystitis. Results discussed with Dr. Truong in the emergency   department at time of interpretation.      67y M PMHx HTN presented w/ abdominal pain, chills and vomiting this morning, was seen in ED yesterday for the same, He experienced the same pain in September (was in [place] at the time) - where he was told he had gallstones, a dilated common bile duct, and underwent an ERCP (states a stone was removed) Since being back in [place], the pt was seeing Dr. Cruz who told him to follow a low fat diet. In ED yesterday US and CT noted dilated CBD, and gallstones no cholecystitis and no LFT or bili abnormality, was seen by surgery, no intervention recommended, was told to f/u w/ Dr. Cruz, states he called Dr. Cruz office today as he continued to vomit and was told to go to ED.   Repeat CT scan performed w/ IV contrast today w/ "New pneumobilia, likely related to recent passage of a ductal stone. New periportal edema, perihepatic fluid, and gallbladder inflammation. Differential considerations include mild cholangitis and/or cholecystitis." Was noted to be febrile to 104.9 rectally earlier and had a lactate of 4.5 (which has now cleared s/p IVF).   Pt was started on Zosyn and given 4L IVF as well as mIVF, however this evening pt hypotensive w/ SBP 80s. MICU consulted for further management.   Assessment:  1. Septic Shock  2. Cholangitis vs. Cholecystitis    Plan:  NEURO:  -Analgesia PRN.     CV:  -Start Levophed infusion, actively titrating to maintain goal MAP >65 monitoring end points of organ perfusion. Start Midodrine to facilitate weaning off vasopressors.   -Keep K~4 and Mg>2 for optimal arrhythmia suppression.    RESP:  -No acute issues, satting well on RA.     RENAL:  -Renal function currently WNL.  -trend lytes/Scr daily with BMP  -I's and O's, goal UOP 0.5 cc/kg/hr  -renal dose meds and avoid nephrotoxins     GI:  -NPO.  -Protonix QD.   -Surgery and GI both following. Spoke w/ Surgery PA who has been in contact w/ Dr. Baum. Dr. Baum believes patient passed sludge or small stone through CBD causing bacteremia, fever, CBD was 1 cm last night and 6mm now. Possible ERCP vs. Cholecystectomy, awaiting recs. No WBC, LFTs/Bili WNL, no current abdominal pain.     ENDO:  -Aggressive glycemic control to limit FS glucose to <180mg/dl. BS WNL.    ID:  -Febrile, no leukocytosis. Lactate cleared s/p IVF. BloodCx, UrineCx sent. Empiric Zosyn.     HEME:  -DVT ppx w/ Lovenox.     DISPO: D/w eICU attending Dr. Arndt. Will admit to MICU.    CRITICAL CARE TIME SPENT:  45 minutes of critical care time spent providing medical care for patient's acute illness/conditions that impairs at least one vital organ system and/or poses a high risk of imminent or life threatening deterioration in the patient's condition. It includes time spent evaluating and treating the patient's acute illness as well as time spent reviewing labs, radiology, discussing goals of care with patient and/or patient's family, and discussing the case with a multidisciplinary team, including the eICU, in an effort to prevent further life threatening deterioration or end organ damage. This time is independent of any procedures performed.

## 2022-06-13 NOTE — CONSULT NOTE ADULT - ASSESSMENT
cbd stone  \ vs gb disease    plan  iv abs  d/w family and pt at bedside  ct scan a/p with po and iv abs  f/u cultrurs  suirgery to see the patient  npo  pain management  plan for lap shahriar vs ercp    Advanced care planning was discussed with patient and family.  Advanced care planning forms were reviewed and discussed.  Risks, benefits and alternatives of gastroenterologic procedures were discussed in detail and all questions were answered.    30 minutes spent.

## 2022-06-13 NOTE — ED ADULT NURSE REASSESSMENT NOTE - NS ED NURSE REASSESS COMMENT FT1
1940: Spoke with Dr. DAYANA Fong re pt"s V.S> NS @ 150/hr via pump as prescribed. ICU consult to be done by Dr. Fong.

## 2022-06-13 NOTE — ED PROVIDER NOTE - PROGRESS NOTE DETAILS
Dr ramsay to see patient dr ramsay will take for ercp, pending labs and US  surgery consulted as well labs with no lft abnormality, gi requesting CT with contrast, pt feeling improve,d but bp dropping, will give another L NS   admit to dr pollard freedom: radiology called with ct read after pt admitted, could not reach inpatient team, results relayed ot both surgery pa and dr pollard labs with no lft abnormality, gi requesting CT with contrast, pt feeling improve,d but bp dropping, will give another L NS , hold ct until bp normalizes,   admit to dr pollard

## 2022-06-13 NOTE — ED PROVIDER NOTE - CLINICAL SUMMARY MEDICAL DECISION MAKING FREE TEXT BOX
68yo M with known gallstones and choledocholithiasis with persistent abd pain, nausea, vomiting, seen in ED for same yestesrday with normal bloodwork, recommened to stay on low fat diet, will rpt labs, GI consult

## 2022-06-13 NOTE — ED PROVIDER NOTE - OBJECTIVE STATEMENT
66yo M  pw abdominal pain, chills and vomiting, was seen in ED yesterday for the same, He experienced the same pain in September (was in [place] at the time) - where he was told he had gallstones, a dilated common bile duct, and underwent an ERCP (states a stone was removed) Since being back in [place], the pt was seeing Dr. Ramsay who told him to follow a low fat diet. Admits to chills, nausea, vomiting (x7 - food contents, no hematemesis), in ED yesterday US and CT noted dilated CBD, and gallstones no cholecystitis and no LFT or bili abnormality, was seen by surgery, no intervention recommended, was told to rusty ramsay. pt states he called dr ramsay office today bec he continues to vomit and was told to go to ED

## 2022-06-13 NOTE — PROVIDER CONTACT NOTE (EICU) - SITUATION
67y M PMHx HTN presented w/ abdominal pain, chills and vomiting this morning, was seen in ED yesterday for the same, He experienced the same pain in September (was in [place] at the time) - where he was told he had gallstones, a dilated common bile duct, and underwent an ERCP (states a stone was removed) Since being back in [place], the pt was seeing Dr. Cruz who told him to follow a low fat diet. In ED yesterday US and CT noted dilated CBD, and gallstones no cholecystitis and no LFT or bili abnormality, was seen by surgery, no intervention recommended, was told to f/u w/ Dr. Cruz, states he called Dr. Cruz office today as he continued to vomit and was told to go to ED.   Repeat CT scan performed w/ IV contrast today w/ "New pneumobilia, likely related to recent passage of a ductal stone. New periportal edema, perihepatic fluid, and gallbladder inflammation. Differential considerations include mild cholangitis and/or cholecystitis." Was noted to be febrile to 104.9 rectally earlier and had a lactate of 4.5 (which has now cleared s/p IVF).   Pt was started on Zosyn and given 4L IVF as well as mIVF, however this evening pt hypotensive w/ SBP 80s.  Patient supported on Levophed and volume.  Case discussed with the ICU PA

## 2022-06-13 NOTE — PROGRESS NOTE ADULT - SUBJECTIVE AND OBJECTIVE BOX
I think patient passed sludge or small stone through CBD causing bacteremia, fever, CBD was 1 cm last night and 6mm now. I doubt acute cholecystitis since he has no abd pain, abdomen is soft, non tender. He refers having fever, vomiting last september when he had ERCP in [place]. Normal WBC now. Will f/u repeat Abd CT.  Cont IV Abx

## 2022-06-13 NOTE — ED PROVIDER NOTE - CRITICAL CARE ATTENDING CONTRIBUTION TO CARE
Upon my evaluation, this patient had a high probability of imminent or life-threatening deterioration due to _septic shock with hypotension________, which required my direct attention, intervention, and personal management.  The patient has a  medical condition that impairs one or more vital organ systems.  Frequent personal assessment and adjustment of medical interventions was performed.      I have personally provided ___ minutes of critical care time exclusive of time spent on separately billable procedures. Time includes review of laboratory data, radiology results, discussion with consultants, patient and family; monitoring for potential decompensation, as well as time spent retrieving data and reviewing the chart and documenting the visit. Interventions were performed as documented above.

## 2022-06-13 NOTE — PROVIDER CONTACT NOTE (EICU) - BACKGROUND
Vital Signs Last 24 Hrs  T(C): 37.2 (13 Jun 2022 21:00), Max: 40.5 (13 Jun 2022 10:45)  T(F): 98.9 (13 Jun 2022 21:00), Max: 104.9 (13 Jun 2022 10:45)  HR: 71 (13 Jun 2022 21:00) (71 - 87)  BP: 91/51 (13 Jun 2022 21:00) (82/50 - 135/70)  BP(mean): 65 (13 Jun 2022 21:00) (65 - 65)  RR: 17 (13 Jun 2022 21:00) (15 - 18)  SpO2: 97% (13 Jun 2022 21:00) (97% - 100%)

## 2022-06-13 NOTE — CONSULT NOTE ADULT - SUBJECTIVE AND OBJECTIVE BOX
SURGERY PA CONSULT NOTE:    CHIEF COMPLAINT:  Patient is a 67y old  Male who presents with a chief complaint of persistent vomiting and fever.      HPI FROM ED:  HPI: 66yo M  pw abdominal pain, chills and vomiting, was seen in ED yesterday for the same, He experienced the same pain in September (was in [place] at the time) - where he was told he had gallstones, a dilated common bile duct, and underwent an ERCP (states a stone was removed) Since being back in [place], the pt was seeing Dr. Cruz who told him to follow a low fat diet. Admits to chills, nausea, vomiting (x7 - food contents, no hematemesis), in ED yesterday US and CT noted dilated CBD, and gallstones no cholecystitis and no LFT or bili abnormality, was seen by surgery, no intervention recommended, was told to rusty Cruz.  Pt states he called dr Cruz office today because he continues to vomit and was told to go to the ED.    He reports after leaving the ED last night, around     PAST MEDICAL HISTORY:  PAST MEDICAL & SURGICAL HISTORY:  HTN (hypertension)    PAST SURGICAL HISTORY: none     REVIEW OF SYSTEMS:  General/Constitutional: No acute distress, no headache, weakness, fevers, or chills   HEENT: Denies auditory or visual changes/disturbances, no vertigo, no throat pain, no dysphagia    Neck: Denies neck pain/stiffness, denies swelling/lumps/hoarseness   Lymphatic: Denies lumps or swelling in the axillae, groin, or neck bilaterally   Respiratory: Denies cough/hemoptysis, denies wheezing/SOB/dyspnea  Cardiac: Denies chest pain, palpitations  Abdomen: Denies abdominal bloating/fullness, nausea or vomiting, denies abdominal pain  Extremities: Denies sores, swelling, discoloration bilat UE/LE  Genitourinary: Denies urinary issues or complaints, denies dysuria/hematuria  Neuro: Denies weakness, paraesthesias, paralysis, syncope, loss of vision  Skin: Denies pruritus, pain, rashes  Psych: Denies hallucinations, visual disturbances, or depression    MEDICATIONS:  Home Medications:    MEDICATIONS  (STANDING):    MEDICATIONS  (PRN):      ALLERGIES:  Allergies    No Known Allergies    Intolerances        SOCIAL HISTORY:  Social History:    Smoking: Yes [ ]  No [ ]   ______pk yrs  ETOH  Yes [ ]  No [ ]  Social [ ]  DRUGS:  Yes [ ]  No [ ]  if so what______________    FAMILY HISTORY:  FAMILY HISTORY:      VITAL SIGNS:  Vital Signs Last 24 Hrs  T(C): 38.4 (13 Jun 2022 14:27), Max: 40.5 (13 Jun 2022 10:45)  T(F): 101.2 (13 Jun 2022 14:27), Max: 104.9 (13 Jun 2022 10:45)  HR: 79 (13 Jun 2022 14:27) (68 - 79)  BP: 86/49 (13 Jun 2022 14:27) (86/49 - 135/70)  BP(mean): --  RR: 16 (13 Jun 2022 14:27) (15 - 18)  SpO2: 97% (13 Jun 2022 14:27) (97% - 100%)    PHYSICAL EXAM:  General: No acute distress, appears comfortable, well nourished, well-groomed, appears stated age  Head, Eyes, Ears, Nose, Throat: Normal cephalic/atraumatic, anicteric, conjunctiva-non injected and moist, vision grossly intact, hearing grossly intact, no nasal discharge, ears and nose symmetrical and atraumatic.  Nasal, oral, and oropharyngeal mucosa pink moist with no evidence of ulceration  Neck: Supple, carotids have good upstroke, trachea in the midline, without JVD or thyromegaly  Lymphatic: No evidence of masses or lymphadenopathy in the head, neck, trunk, axillary, inguinal, or supraclavicular regions  Chest: Lungs are clear to P&A, no wheezing, no rales, no ronchi, with good inspiratory effort  Heart: Heart rhythm regular, no murmurs  Abdomen: Soft, non tender, good bowel sounds present in all four quadrants.  No guarding, rebound, and no peritoneal signs.  No evidence of hepatosplenomegaly.  No evidence of abdominal wall hernias.  Inguinal regions are unremarkable with no evidence of hernias.   Extremity: No swelling, or open sores, no gross deformities,  good range of motion, 2+ peripheral pulses bilat UE/LE, no edema,  negative Franki's sign, no lymphadenopathy  Neuro: Alert and oriented x3, motor and sensory intact  Psychiatric: Awake , alert, oriented x3 with an appropriate affect.   Skin: Good color, turgor, texture with no gross lesions, no eruptions, no rashes, no subcutaneous nodules and normal temperature.     LABS:                        13.6   8.00  )-----------( 172      ( 13 Jun 2022 12:30 )             39.1     06-13    131<L>  |  98  |  10  ----------------------------<  106<H>  3.6   |  21<L>  |  0.85    Ca    8.1<L>      13 Jun 2022 12:30    TPro  6.7  /  Alb  3.2<L>  /  TBili  1.1  /  DBili  x   /  AST  19  /  ALT  26  /  AlkPhos  84  06-13    PT/INR - ( 13 Jun 2022 12:30 )   PT: 14.7 sec;   INR: 1.25 ratio         PTT - ( 13 Jun 2022 12:30 )  PTT:26.3 sec    LIVER FUNCTIONS - ( 13 Jun 2022 12:30 )  Alb: 3.2 g/dL / Pro: 6.7 g/dL / ALK PHOS: 84 U/L / ALT: 26 U/L / AST: 19 U/L / GGT: x               RADIOLOGY & ADDITIONAL STUDIES:    ASSESSMENT:    PLAN: SURGERY PA CONSULT NOTE:    CHIEF COMPLAINT:  Patient is a 67y old  Male who presents with a chief complaint of persistent vomiting.      HPI FROM ED:  HPI: 68yo M pw abdominal pain, chills and vomiting, was seen in ED yesterday for the same, He experienced the same pain in September (was in [place] at the time) - where he was told he had gallstones, a dilated common bile duct, and underwent an ERCP (states a stone was removed) Since being back in [place], the pt was seeing Dr. Cruz who told him to follow a low fat diet. Admits to chills, nausea, vomiting (x7 - food contents, no hematemesis), in ED yesterday US and CT noted dilated CBD, and gallstones no cholecystitis and no LFT or bili abnormality, was seen by surgery, no intervention recommended, was told to rusty Cruz.  Pt states he called dr Cruz office today because he continues to vomit and was told to go to the ED.    He reports approximately 7 episodes of non-bilious emesis since leaving the ED last night, lasting throughout the night into the morning, last episode was at approximately 11:30 am.  In the ED, he was found to be febrile to 104.5.  Currently he denies chills, abdominal pain, constipation, diarrhea, melena, change in bowel habits.      PAST MEDICAL HISTORY:  PAST MEDICAL & SURGICAL HISTORY:  HTN (hypertension)    PAST SURGICAL HISTORY: none     REVIEW OF SYSTEMS:  General/Constitutional: No acute distress, no headache, weakness, chills, +fevers   HEENT: Denies auditory or visual changes/disturbances, no vertigo, no throat pain, no dysphagia    Neck: Denies neck pain/stiffness, denies swelling/lumps/hoarseness   Lymphatic: Denies lumps or swelling in the axillae, groin, or neck bilaterally   Respiratory: Denies cough/hemoptysis, denies wheezing/SOB/dyspnea  Cardiac: Denies chest pain, palpitations  Abdomen: Denies abdominal bloating/fullness, denies abdominal pain, +vomiting  Extremities: Denies sores, swelling, discoloration bilat UE/LE  Genitourinary: Denies urinary issues or complaints, denies dysuria/hematuria  Neuro: Denies weakness, paraesthesias, paralysis, syncope, loss of vision  Skin: Denies pruritus, pain, rashes  Psych: Denies hallucinations, visual disturbances, or depression    MEDICATIONS:  Home Medications:    MEDICATIONS  (STANDING): Pantoprazole, Telmisartan 20 mg    MEDICATIONS  (PRN):      ALLERGIES:  Allergies    No Known Allergies    Intolerances    SOCIAL HISTORY:  Social History:    Smoking: Yes [ ]  No [x]   ______pk yrs  ETOH  Yes [ ]  No [X]  Social [ ]  DRUGS:  Yes [ ]  No [x ]  if so what______________    FAMILY HISTORY:  FAMILY HISTORY: HTN      VITAL SIGNS:  Vital Signs Last 24 Hrs  T(C): 38.4 (13 Jun 2022 14:27), Max: 40.5 (13 Jun 2022 10:45)  T(F): 101.2 (13 Jun 2022 14:27), Max: 104.9 (13 Jun 2022 10:45)  HR: 79 (13 Jun 2022 14:27) (68 - 79)  BP: 86/49 (13 Jun 2022 14:27) (86/49 - 135/70)  BP(mean): --  RR: 16 (13 Jun 2022 14:27) (15 - 18)  SpO2: 97% (13 Jun 2022 14:27) (97% - 100%)    PHYSICAL EXAM:  General: No acute distress, appears comfortable, well nourished, well-groomed, appears stated age  Head, Eyes, Ears, Nose, Throat: Normal cephalic/atraumatic, anicteric, conjunctiva-non injected and moist, vision grossly intact, hearing grossly intact, no nasal discharge, ears and nose symmetrical and atraumatic.  Nasal, oral, and oropharyngeal mucosa pink moist with no evidence of ulceration  Neck: Supple, carotids have good upstroke, trachea in the midline, without JVD or thyromegaly  Lymphatic: No evidence of masses or lymphadenopathy in the head, neck, trunk, axillary, inguinal, or supraclavicular regions  Chest: Lungs are clear to P&A, no wheezing, no rales, no ronchi, with good inspiratory effort  Heart: Heart rhythm regular, no murmurs  Abdomen: Soft, non tender, good bowel sounds present in all four quadrants.  No guarding, rebound, and no peritoneal signs.  No evidence of hepatosplenomegaly.  No evidence of abdominal wall hernias.  Inguinal regions are unremarkable with no evidence of hernias.   Extremity: No swelling, or open sores, no gross deformities,  good range of motion, 2+ peripheral pulses bilat UE/LE, no edema,  negative Franki's sign, no lymphadenopathy  Neuro: Alert and oriented x3, motor and sensory intact  Psychiatric: Awake , alert, oriented x3 with an appropriate affect.   Skin: Good color, turgor, texture with no gross lesions, no eruptions, no rashes, no subcutaneous nodules and normal temperature.     LABS:                        13.6   8.00  )-----------( 172      ( 13 Jun 2022 12:30 )             39.1     06-13    131<L>  |  98  |  10  ----------------------------<  106<H>  3.6   |  21<L>  |  0.85    Ca    8.1<L>      13 Jun 2022 12:30    TPro  6.7  /  Alb  3.2<L>  /  TBili  1.1  /  DBili  x   /  AST  19  /  ALT  26  /  AlkPhos  84  06-13    PT/INR - ( 13 Jun 2022 12:30 )   PT: 14.7 sec;   INR: 1.25 ratio         PTT - ( 13 Jun 2022 12:30 )  PTT:26.3 sec    LIVER FUNCTIONS - ( 13 Jun 2022 12:30 )  Alb: 3.2 g/dL / Pro: 6.7 g/dL / ALK PHOS: 84 U/L / ALT: 26 U/L / AST: 19 U/L / GGT: x               RADIOLOGY & ADDITIONAL STUDIES:    ASSESSMENT:  67 year old male with PMHx HTN presenting with persistent vomiting and new onset fevers.      PLAN:  Repeat RUQ US   Possible ERCP as per GI recommendations  Will follow GI recommendations  Follow up labs  IV hydration   Will continue to follow  Above discussed with Dr. Palacios

## 2022-06-13 NOTE — H&P ADULT - PROBLEM SELECTOR PLAN 1
new onset sepsis  etilogy unknown at present  gi and sx evals noted  lfts are nml  ivf and started on iv zosyn in er  trend lactate, temp and wbc  fu rpt ct a/p with contrast  aggressive ivf  fu cultures

## 2022-06-13 NOTE — PROVIDER CONTACT NOTE (EICU) - DATE AND TIME:
[id number]]                              Final result               Extra Red Top Tube[[id number]]                               [id number]

## 2022-06-13 NOTE — CONSULT NOTE ADULT - SUBJECTIVE AND OBJECTIVE BOX
Chief Complaint:  Patient is a 67y old  Male who presents with a chief complaint of abd pain and fevers and chills now with pain upon deep breath and chills  · Chief Complaint: The patient is a 67y Male complaining of abdominal pain.  · HPI Objective Statement: 66yo M  pw abdominal pain, chills and vomiting, was seen in ED yesterday for the same, He experienced the same pain in September (was in [place] at the time) - where he was told he had gallstones, a dilated common bile duct, and underwent an ERCP (states a stone was removed) Since being back in [place], the pt was seeing Dr. Ramsay who told him to follow a low fat diet. Admits to chills, nausea, vomiting (x7 - food contents, no hematemesis), in ED yesterday US and CT noted dilated CBD, and gallstones no cholecystitis and no LFT or bili abnormality, was seen by surgery, no intervention recommended, was told to rusty ramsay. pt states he called dr ramsay office today bec he continues to vomit and was told to go to ED    Allergies:  No Known Allergies    Medications:  PMHX/PSHX:  HTN (hypertension)    Family history:      Social History:   lives aith family no etoh no cigs no ivda    ROS:     General:  No wt loss, fevers, chills, night sweats, fatigue,   Eyes:  Good vision, no reported pain  ENT:  No sore throat, pain, runny nose, dysphagia  CV:  No pain, palpitations, hypo/hypertension  Resp:  No dyspnea, cough, tachypnea, wheezing  GI:  No pain, No nausea, No vomiting, No diarrhea, No constipation, No weight loss, No fever, No pruritis, No rectal bleeding, No tarry stools, No dysphagia,  :  No pain, bleeding, incontinence, nocturia  Muscle:  No pain, weakness  Neuro:  No weakness, tingling, memory problems  Psych:  No fatigue, insomnia, mood problems, depression  Endocrine:  No polyuria, polydipsia, cold/heat intolerance  Heme:  No petechiae, ecchymosis, easy bruisability  Skin:  No rash, tattoos, scars, edema      PHYSICAL EXAM:   Vital Signs:  Vital Signs Last 24 Hrs  T(C): 38.4 (13 Jun 2022 14:27), Max: 40.5 (13 Jun 2022 10:45)  T(F): 101.2 (13 Jun 2022 14:27), Max: 104.9 (13 Jun 2022 10:45)  HR: 79 (13 Jun 2022 14:27) (68 - 79)  BP: 86/49 (13 Jun 2022 14:27) (86/49 - 135/70)  BP(mean): --  RR: 16 (13 Jun 2022 14:27) (15 - 18)  SpO2: 97% (13 Jun 2022 14:27) (97% - 100%)  Daily Height in cm: 170.18 (13 Jun 2022 10:32)    Daily     GENERAL:  Appears stated age, well-groomed, well-nourished, no distress  HEENT:  NC/AT,  conjunctivae clear and pink, no thyromegaly, nodules, adenopathy, no JVD, sclera -anicteric  CHEST:  Full & symmetric excursion, no increased effort, breath sounds clear  HEART:  Regular rhythm, S1, S2, no murmur/rub/S3/S4, no abdominal bruit, no edema  ABDOMEN:  Soft, non-tender, non-distended, normoactive bowel sounds,  no masses ,no hepato-splenomegaly, no signs of chronic liver disease  EXTEREMITIES:  no cyanosis,clubbing or edema  SKIN:  No rash/erythema/ecchymoses/petechiae/wounds/abscess/warm/dry  NEURO:  Alert, oriented, no asterixis, no tremor, no encephalopathy    LABS:                        13.6   8.00  )-----------( 172      ( 13 Jun 2022 12:30 )             39.1     06-13    131<L>  |  98  |  10  ----------------------------<  106<H>  3.6   |  21<L>  |  0.85    Ca    8.1<L>      13 Jun 2022 12:30    TPro  6.7  /  Alb  3.2<L>  /  TBili  1.1  /  DBili  x   /  AST  19  /  ALT  26  /  AlkPhos  84  06-13    LIVER FUNCTIONS - ( 13 Jun 2022 12:30 )  Alb: 3.2 g/dL / Pro: 6.7 g/dL / ALK PHOS: 84 U/L / ALT: 26 U/L / AST: 19 U/L / GGT: x           PT/INR - ( 13 Jun 2022 12:30 )   PT: 14.7 sec;   INR: 1.25 ratio         PTT - ( 13 Jun 2022 12:30 )  PTT:26.3 sec    Amylase Serum--      Lipase [id number]       Ammonia--  Amylase Serum--      Lipase [id number]       Ammonia--      Imaging:

## 2022-06-13 NOTE — ED ADULT NURSE REASSESSMENT NOTE - NS ED NURSE REASSESS COMMENT FT1
1618: Pt seen by Dr. DAYANA Fong re pt's V.S 89/52  87  16  97% RA after 3 liters of NS., lactate level repeated. Pt on cardiac monitor with continuos pulse.

## 2022-06-13 NOTE — H&P ADULT - HISTORY OF PRESENT ILLNESS
68yo M  pw abdominal pain, chills and vomiting, was seen in ED yesterday for the same, He experienced the same pain in September (was in [place] at the time) - where he was told he had gallstones, a dilated common bile duct, and underwent an ERCP (states a stone was removed) Since being back in [place], the pt was seeing Dr. Ramsay who told him to follow a low fat diet. Admits to chills, nausea, vomiting (x7 - food contents, no hematemesis), in ED yesterday US and CT noted dilated CBD, and gallstones no cholecystitis and no LFT or bili abnormality, was seen by surgery, no intervention recommended, was told to rusty ramsay. pt states he called dr ramsay office today bec he continues to vomit and was told to go to ED

## 2022-06-14 ENCOUNTER — TRANSCRIPTION ENCOUNTER (OUTPATIENT)
Age: 67
End: 2022-06-14

## 2022-06-14 LAB
-  STREPTOCOCCUS ANGINOSUS GROUP: SIGNIFICANT CHANGE UP
ALBUMIN SERPL ELPH-MCNC: 2.5 G/DL — LOW (ref 3.3–5)
ALP SERPL-CCNC: 65 U/L — SIGNIFICANT CHANGE UP (ref 40–120)
ALT FLD-CCNC: 29 U/L — SIGNIFICANT CHANGE UP (ref 12–78)
ANION GAP SERPL CALC-SCNC: 7 MMOL/L — SIGNIFICANT CHANGE UP (ref 5–17)
AST SERPL-CCNC: 25 U/L — SIGNIFICANT CHANGE UP (ref 15–37)
BILIRUB SERPL-MCNC: 0.7 MG/DL — SIGNIFICANT CHANGE UP (ref 0.2–1.2)
BUN SERPL-MCNC: 14 MG/DL — SIGNIFICANT CHANGE UP (ref 7–23)
CALCIUM SERPL-MCNC: 7.5 MG/DL — LOW (ref 8.5–10.1)
CHLORIDE SERPL-SCNC: 106 MMOL/L — SIGNIFICANT CHANGE UP (ref 96–108)
CO2 SERPL-SCNC: 22 MMOL/L — SIGNIFICANT CHANGE UP (ref 22–31)
CREAT SERPL-MCNC: 0.84 MG/DL — SIGNIFICANT CHANGE UP (ref 0.5–1.3)
CULTURE RESULTS: SIGNIFICANT CHANGE UP
EGFR: 97 ML/MIN/1.73M2 — SIGNIFICANT CHANGE UP
GLUCOSE SERPL-MCNC: 95 MG/DL — SIGNIFICANT CHANGE UP (ref 70–99)
GRAM STN FLD: SIGNIFICANT CHANGE UP
HCT VFR BLD CALC: 34.9 % — LOW (ref 39–50)
HCV AB S/CO SERPL IA: 0.13 S/CO — SIGNIFICANT CHANGE UP (ref 0–0.99)
HCV AB SERPL-IMP: SIGNIFICANT CHANGE UP
HGB BLD-MCNC: 11.9 G/DL — LOW (ref 13–17)
K OXYTOCA DNA BLD POS QL NAA+NON-PROBE: SIGNIFICANT CHANGE UP
LACTATE SERPL-SCNC: 1.2 MMOL/L — SIGNIFICANT CHANGE UP (ref 0.7–2)
MAGNESIUM SERPL-MCNC: 1.9 MG/DL — SIGNIFICANT CHANGE UP (ref 1.6–2.6)
MCHC RBC-ENTMCNC: 29.2 PG — SIGNIFICANT CHANGE UP (ref 27–34)
MCHC RBC-ENTMCNC: 34.1 GM/DL — SIGNIFICANT CHANGE UP (ref 32–36)
MCV RBC AUTO: 85.5 FL — SIGNIFICANT CHANGE UP (ref 80–100)
METHOD TYPE: SIGNIFICANT CHANGE UP
METHOD TYPE: SIGNIFICANT CHANGE UP
NRBC # BLD: 0 /100 WBCS — SIGNIFICANT CHANGE UP (ref 0–0)
PHOSPHATE SERPL-MCNC: 2.1 MG/DL — LOW (ref 2.5–4.5)
PLATELET # BLD AUTO: 138 K/UL — LOW (ref 150–400)
POTASSIUM SERPL-MCNC: 3.5 MMOL/L — SIGNIFICANT CHANGE UP (ref 3.5–5.3)
POTASSIUM SERPL-SCNC: 3.5 MMOL/L — SIGNIFICANT CHANGE UP (ref 3.5–5.3)
PROT SERPL-MCNC: 5.3 G/DL — LOW (ref 6–8.3)
RBC # BLD: 4.08 M/UL — LOW (ref 4.2–5.8)
RBC # FLD: 12.4 % — SIGNIFICANT CHANGE UP (ref 10.3–14.5)
SODIUM SERPL-SCNC: 135 MMOL/L — SIGNIFICANT CHANGE UP (ref 135–145)
SPECIMEN SOURCE: SIGNIFICANT CHANGE UP
WBC # BLD: 17.32 K/UL — HIGH (ref 3.8–10.5)
WBC # FLD AUTO: 17.32 K/UL — HIGH (ref 3.8–10.5)

## 2022-06-14 PROCEDURE — 99291 CRITICAL CARE FIRST HOUR: CPT | Mod: GC

## 2022-06-14 RX ORDER — ONDANSETRON 8 MG/1
4 TABLET, FILM COATED ORAL ONCE
Refills: 0 | Status: COMPLETED | OUTPATIENT
Start: 2022-06-14 | End: 2022-06-14

## 2022-06-14 RX ORDER — CEFTRIAXONE 500 MG/1
2000 INJECTION, POWDER, FOR SOLUTION INTRAMUSCULAR; INTRAVENOUS EVERY 24 HOURS
Refills: 0 | Status: DISCONTINUED | OUTPATIENT
Start: 2022-06-15 | End: 2022-06-17

## 2022-06-14 RX ORDER — CEFTRIAXONE 500 MG/1
2000 INJECTION, POWDER, FOR SOLUTION INTRAMUSCULAR; INTRAVENOUS EVERY 12 HOURS
Refills: 0 | Status: DISCONTINUED | OUTPATIENT
Start: 2022-06-14 | End: 2022-06-14

## 2022-06-14 RX ORDER — INFLUENZA VIRUS VACCINE 15; 15; 15; 15 UG/.5ML; UG/.5ML; UG/.5ML; UG/.5ML
0.7 SUSPENSION INTRAMUSCULAR ONCE
Refills: 0 | Status: DISCONTINUED | OUTPATIENT
Start: 2022-06-14 | End: 2022-06-17

## 2022-06-14 RX ORDER — ACETAMINOPHEN 500 MG
325 TABLET ORAL ONCE
Refills: 0 | Status: COMPLETED | OUTPATIENT
Start: 2022-06-14 | End: 2022-06-14

## 2022-06-14 RX ORDER — CEFTRIAXONE 500 MG/1
1000 INJECTION, POWDER, FOR SOLUTION INTRAMUSCULAR; INTRAVENOUS ONCE
Refills: 0 | Status: COMPLETED | OUTPATIENT
Start: 2022-06-14 | End: 2022-06-14

## 2022-06-14 RX ORDER — POTASSIUM PHOSPHATE, MONOBASIC POTASSIUM PHOSPHATE, DIBASIC 236; 224 MG/ML; MG/ML
15 INJECTION, SOLUTION INTRAVENOUS ONCE
Refills: 0 | Status: COMPLETED | OUTPATIENT
Start: 2022-06-14 | End: 2022-06-14

## 2022-06-14 RX ORDER — ACETAMINOPHEN 500 MG
1000 TABLET ORAL ONCE
Refills: 0 | Status: COMPLETED | OUTPATIENT
Start: 2022-06-14 | End: 2022-06-14

## 2022-06-14 RX ORDER — ENOXAPARIN SODIUM 100 MG/ML
40 INJECTION SUBCUTANEOUS EVERY 24 HOURS
Refills: 0 | Status: DISCONTINUED | OUTPATIENT
Start: 2022-06-14 | End: 2022-06-15

## 2022-06-14 RX ORDER — CEFTRIAXONE 500 MG/1
1000 INJECTION, POWDER, FOR SOLUTION INTRAMUSCULAR; INTRAVENOUS EVERY 24 HOURS
Refills: 0 | Status: DISCONTINUED | OUTPATIENT
Start: 2022-06-14 | End: 2022-06-14

## 2022-06-14 RX ORDER — METRONIDAZOLE 500 MG
500 TABLET ORAL EVERY 8 HOURS
Refills: 0 | Status: DISCONTINUED | OUTPATIENT
Start: 2022-06-14 | End: 2022-06-14

## 2022-06-14 RX ORDER — METRONIDAZOLE 500 MG
500 TABLET ORAL EVERY 8 HOURS
Refills: 0 | Status: DISCONTINUED | OUTPATIENT
Start: 2022-06-14 | End: 2022-06-17

## 2022-06-14 RX ADMIN — PIPERACILLIN AND TAZOBACTAM 25 GRAM(S): 4; .5 INJECTION, POWDER, LYOPHILIZED, FOR SOLUTION INTRAVENOUS at 05:24

## 2022-06-14 RX ADMIN — Medication 1000 MILLIGRAM(S): at 16:00

## 2022-06-14 RX ADMIN — POTASSIUM PHOSPHATE, MONOBASIC POTASSIUM PHOSPHATE, DIBASIC 62.5 MILLIMOLE(S): 236; 224 INJECTION, SOLUTION INTRAVENOUS at 13:13

## 2022-06-14 RX ADMIN — Medication 400 MILLIGRAM(S): at 15:02

## 2022-06-14 RX ADMIN — Medication 100 MILLIGRAM(S): at 21:24

## 2022-06-14 RX ADMIN — SODIUM CHLORIDE 150 MILLILITER(S): 9 INJECTION, SOLUTION INTRAVENOUS at 18:11

## 2022-06-14 RX ADMIN — MIDODRINE HYDROCHLORIDE 10 MILLIGRAM(S): 2.5 TABLET ORAL at 05:24

## 2022-06-14 RX ADMIN — Medication 650 MILLIGRAM(S): at 22:20

## 2022-06-14 RX ADMIN — CEFTRIAXONE 100 MILLIGRAM(S): 500 INJECTION, POWDER, FOR SOLUTION INTRAMUSCULAR; INTRAVENOUS at 14:05

## 2022-06-14 RX ADMIN — Medication 325 MILLIGRAM(S): at 22:20

## 2022-06-14 RX ADMIN — Medication 325 MILLIGRAM(S): at 21:35

## 2022-06-14 RX ADMIN — PANTOPRAZOLE SODIUM 40 MILLIGRAM(S): 20 TABLET, DELAYED RELEASE ORAL at 11:35

## 2022-06-14 RX ADMIN — PIPERACILLIN AND TAZOBACTAM 25 GRAM(S): 4; .5 INJECTION, POWDER, LYOPHILIZED, FOR SOLUTION INTRAVENOUS at 11:35

## 2022-06-14 RX ADMIN — Medication 650 MILLIGRAM(S): at 21:35

## 2022-06-14 RX ADMIN — SODIUM CHLORIDE 150 MILLILITER(S): 9 INJECTION, SOLUTION INTRAVENOUS at 11:41

## 2022-06-14 RX ADMIN — Medication 500 MILLIGRAM(S): at 14:06

## 2022-06-14 RX ADMIN — ONDANSETRON 4 MILLIGRAM(S): 8 TABLET, FILM COATED ORAL at 15:02

## 2022-06-14 RX ADMIN — ENOXAPARIN SODIUM 40 MILLIGRAM(S): 100 INJECTION SUBCUTANEOUS at 13:13

## 2022-06-14 RX ADMIN — CEFTRIAXONE 100 MILLIGRAM(S): 500 INJECTION, POWDER, FOR SOLUTION INTRAMUSCULAR; INTRAVENOUS at 19:02

## 2022-06-14 NOTE — DISCHARGE NOTE PROVIDER - NSDCMRMEDTOKEN_GEN_ALL_CORE_FT
telmisartan 20 mg oral tablet: 1 tab(s) orally once a day   cefpodoxime 200 mg oral tablet: 1 tab(s) orally every 12 hours   metroNIDAZOLE 500 mg oral tablet: 1 tab(s) orally 3 times a day   telmisartan 20 mg oral tablet: 1 tab(s) orally once a day

## 2022-06-14 NOTE — DIETITIAN INITIAL EVALUATION ADULT - ADD RECOMMEND
1) Recommend low fat diet  2) Monitor PO intake, diet tolerance, weight trends, labs, GI function, and skin integrity.

## 2022-06-14 NOTE — PROGRESS NOTE ADULT - ASSESSMENT
67y M PMHx HTN presented w/ abdominal pain, chills and vomiting this morning, was seen in ED yesterday for the same, He experienced the same pain in September (was in [place] at the time) - where he was told he had gallstones, a dilated common bile duct, and underwent an ERCP (states a stone was removed) Since being back in [place], the pt was seeing Dr. Cruz who told him to follow a low fat diet. In ED yesterday US and CT noted dilated CBD, and gallstones no cholecystitis and no LFT or bili abnormality, was seen by surgery, no intervention recommended, was told to f/u w/ Dr. Cruz, states he called Dr. Cruz office today as he continued to vomit and was told to go to ED.   Repeat CT scan performed w/ IV contrast today w/ "New pneumobilia, likely related to recent passage of a ductal stone. New periportal edema, perihepatic fluid, and gallbladder inflammation. Differential considerations include mild cholangitis and/or cholecystitis." Was noted to be febrile to 104.9 rectally earlier and had a lactate of 4.5 (which has now cleared s/p IVF).   Pt was started on Zosyn and given 4L IVF as well as mIVF, however this evening pt hypotensive w/ SBP 80s. MICU consulted for further management.   Assessment:  1. Septic Shock  2. Cholangitis vs. Cholecystitis    Plan:  NEURO:  -Analgesia PRN.     CV:  -Start Levophed infusion, actively titrating to maintain goal MAP >65 monitoring end points of organ perfusion. Start Midodrine to facilitate weaning off vasopressors.   -Keep K~4 and Mg>2 for optimal arrhythmia suppression.    RESP:  -No acute issues, satting well on RA.     RENAL:  -Renal function currently WNL.  -trend lytes/Scr daily with BMP  -I's and O's, goal UOP 0.5 cc/kg/hr  -renal dose meds and avoid nephrotoxins     GI:  -NPO.  -Protonix QD.   -Surgery and GI both following. Spoke w/ Surgery PA who has been in contact w/ Dr. Baum. Dr. Baum believes patient passed sludge or small stone through CBD causing bacteremia, fever, CBD was 1 cm last night and 6mm now. Possible ERCP vs. Cholecystectomy, awaiting recs. No WBC, LFTs/Bili WNL, no current abdominal pain.     ENDO:  -Aggressive glycemic control to limit FS glucose to <180mg/dl. BS WNL.    ID:  -Febrile, no leukocytosis. Lactate cleared s/p IVF. BloodCx, UrineCx sent. Empiric Zosyn.     HEME:  -DVT ppx w/ Lovenox.    67y M PMHx HTN presented w/ abdominal pain, chills and vomiting this morning, was seen in ED yesterday for the same, He experienced the same pain in September (was in [place] at the time) - where he was told he had gallstones, a dilated common bile duct, and underwent an ERCP (states a stone was removed) Since being back in [place], the pt was seeing Dr. Cruz who told him to follow a low fat diet. In ED yesterday US and CT noted dilated CBD, and gallstones no cholecystitis and no LFT or bili abnormality, was seen by surgery, no intervention recommended, was told to f/u w/ Dr. Cruz, states he called Dr. Cruz office today as he continued to vomit and was told to go to ED.   Repeat CT scan performed w/ IV contrast today w/ "New pneumobilia, likely related to recent passage of a ductal stone. New periportal edema, perihepatic fluid, and gallbladder inflammation. Differential considerations include mild cholangitis and/or cholecystitis." Was noted to be febrile to 104.9 rectally earlier and had a lactate of 4.5 (which has now cleared s/p IVF).   Pt was started on Zosyn and given 4L IVF as well as mIVF, however this evening pt hypotensive w/ SBP 80s. MICU consulted for further management.     Assessment:  1. Sepsis  2. Cholangitis vs. Cholecystitis    Plan:  NEURO:  -Analgesia PRN.     CV:  -Start Levophed infusion, actively titrating to maintain goal MAP >65 monitoring end points of organ perfusion. Start Midodrine to facilitate weaning off vasopressors.   -Keep K~4 and Mg>2 for optimal arrhythmia suppression.    RESP:  -No acute issues, satting well on RA.     RENAL:  -Renal function currently WNL.  -trend lytes/Scr daily with BMP  -I's and O's, goal UOP 0.5 cc/kg/hr  -renal dose meds and avoid nephrotoxins     GI:  -NPO.  -Protonix QD.   -Surgery and GI both following. Spoke w/ Surgery PA who has been in contact w/ Dr. Baum. Dr. Baum believes patient passed sludge or small stone through CBD causing bacteremia, fever, CBD was 1 cm last night and 6mm now. Possible ERCP vs. Cholecystectomy, awaiting recs. No WBC, LFTs/Bili WNL, no current abdominal pain.     ENDO:  -Aggressive glycemic control to limit FS glucose to <180mg/dl. BS WNL.    ID:  -Febrile, no leukocytosis. Lactate cleared s/p IVF. BloodCx, UrineCx sent. Empiric Zosyn.     HEME:  -DVT ppx w/ Lovenox.    68 yo M PMHx HTN presented to ED w/ abdominal pain, chills and vomiting x few days, admitted for septic shock in the setting of mild cholangitis and/or cholecystitis.     NEURO:  - AAOx3     CV:  - Hemodynamically stable  - D/c Midodrine and Levophed  - Continue IV LR    RESP:  - No acute issues, sating well on RA     RENAL:  - No active issues, renal function wnl  - Monitor I&Os    GI:  - NPO for now  - Continue Protonix   - Plan for ERCP with surgery today     ENDO:  - No acute issues    ID:  - Leukocytosis downtrending, afebrile  - Continue Zosyn  - F/u Blood Cx, Urine Cx    HEME:  - Hold pharmacologic DVT ppx for procedure  - SCDs   68 yo M PMHx HTN presented to ED w/ abdominal pain, chills and vomiting x few days, admitted for septic shock in the setting of mild cholangitis and/or cholecystitis.     NEURO:  - AAOx3     CV:  - Hemodynamically stable  - D/c Midodrine and Levophed  - Continue IV LR    RESP:  - No acute issues, sating well on RA     RENAL:  - No active issues, renal function wnl  - Monitor I&Os    GI:  - NPO for now  - Continue Protonix   - Surgery consulted, recommend ERCP     ENDO:  - No acute issues    ID:  - Leukocytosis downtrending, afebrile  - Blood cx positive for Klebsiella  - D/c Zosyn and start Ceftriaxone and Metronidazole   - F/u urine cx    HEME:  - Hold pharmacologic DVT ppx for procedure  - SCDs   66 yo M PMHx HTN presented to ED w/ abdominal pain, chills and vomiting x few days, admitted for sepsis and hypotension in the setting of mild cholangitis and/or cholecystitis.     NEURO:  - AAOx3     CV:  - Hemodynamically stable  - D/c Midodrine and Levophed  - Continue IV LR    RESP:  - No acute issues, sating well on RA     RENAL:  - No active issues, renal function wnl  - Monitor I&Os    GI:  - NPO for now  - Continue Protonix   - Surgery consulted, recommend ERCP     ENDO:  - No acute issues    ID:  - Leukocytosis downtrending, afebrile  - Blood cx positive for Klebsiella  - D/c Zosyn and start Ceftriaxone and Metronidazole   - F/u urine cx    HEME:  - Hold pharmacologic DVT ppx for procedure  - SCDs

## 2022-06-14 NOTE — PROGRESS NOTE ADULT - ASSESSMENT
cbd stone vs gb disease    offered ERCP today pt refused  he attempted diet and now having pain  pain management  to d/w pt again regarding ERCP    Advanced care planning was discussed with patient and family.  Advanced care planning forms were reviewed and discussed.  Risks, benefits and alternatives of gastroenterologic procedures were discussed in detail and all questions were answered.    30 minutes spent.

## 2022-06-14 NOTE — PROGRESS NOTE ADULT - SUBJECTIVE AND OBJECTIVE BOX
SURGERY PA NOTE ON BEHALF OF Dr. Palacios:    S: Patient seen and examined at bedside.   Patient reports no BM since 2 days ago, as he has had multiple episodes of vomiting yesterday.  Currently he denies vomiting episodes overnight, fevers, chills, abdominal pain.      MEDICATIONS:  acetaminophen     Tablet .. 650 milliGRAM(s) Oral every 6 hours PRN  chlorhexidine 4% Liquid 1 Application(s) Topical <User Schedule>  lactated ringers. 1000 milliLiter(s) IV Continuous <Continuous>  pantoprazole  Injectable 40 milliGRAM(s) IV Push daily  piperacillin/tazobactam IVPB.. 3.375 Gram(s) IV Intermittent every 8 hours      O:  Vital Signs Last 24 Hrs  T(C): 36.9 (14 Jun 2022 08:21), Max: 40.5 (13 Jun 2022 10:45)  T(F): 98.4 (14 Jun 2022 08:21), Max: 104.9 (13 Jun 2022 10:45)  HR: 51 (14 Jun 2022 09:00) (51 - 87)  BP: 118/61 (14 Jun 2022 09:00) (82/50 - 126/66)  BP(mean): 84 (14 Jun 2022 09:00) (65 - 89)  RR: 18 (14 Jun 2022 09:00) (16 - 26)  SpO2: 95% (14 Jun 2022 09:00) (95% - 100%)    I&O SUMMARY:    06-13-22 @ 07:01  -  06-14-22 @ 07:00  --------------------------------------------------------  IN: 1650 mL / OUT: 1900 mL / NET: -250 mL    06-14-22 @ 07:01  -  06-14-22 @ 09:58  --------------------------------------------------------  IN: 300 mL / OUT: 80 mL / NET: 220 mL        PHYSICAL EXAM:  Lungs: CTA bilat without W/R/R  Card: S1S2, SB  Abd: Soft, NT, ND.  +BS x 4.  No rebound/guarding.    Ext: Calves soft, NT, without edema bilat    LABS:                        11.9   17.32 )-----------( 138      ( 14 Jun 2022 07:13 )             34.9     06-14    135  |  106  |  14  ----------------------------<  95  3.5   |  22  |  0.84    Ca    7.5<L>      14 Jun 2022 07:13  Phos  2.1     06-14  Mg     1.9     06-14    TPro  5.3<L>  /  Alb  2.5<L>  /  TBili  0.7  /  DBili  x   /  AST  25  /  ALT  29  /  AlkPhos  65  06-14    PT/INR - ( 13 Jun 2022 12:30 )   PT: 14.7 sec;   INR: 1.25 ratio         PTT - ( 13 Jun 2022 12:30 )  PTT:26.3 sec      RADIOLOGY:  MPRESSION: New pneumobilia, likely related to recent passage of a ductal   stone. New periportal edema, perihepatic fluid, and gallbladder   inflammation. Differential considerations include mild cholangitis and/or   cholecystitis. Results discussed with Dr. Truong in the emergency   department at time of interpretation.    A: 67 year old male with PMHx HTN, known cholelithiasis presenting to the ED with persistent vomiting, abdominal pain, and new onset fevers and hypotension now admitted for further evaluation currently hospital day 1.  Patient has been afebrile overnight, with blood pressure normotensive.      P:  No surgical intervention at this point   GI consulted - scheduled for ERCP today   C/w IV hydration  C/w Abx - appreciate ID rec's   Surgery team will continue to follow  Above discussed with Dr. Palacios

## 2022-06-14 NOTE — DISCHARGE NOTE PROVIDER - NSDCCPCAREPLAN_GEN_ALL_CORE_FT
PRINCIPAL DISCHARGE DIAGNOSIS  Diagnosis: Other specified sepsis  Assessment and Plan of Treatment: finish course of abx  fu with gi and sx

## 2022-06-14 NOTE — PATIENT PROFILE ADULT - FALL HARM RISK - HARM RISK INTERVENTIONS

## 2022-06-14 NOTE — CONSULT NOTE ADULT - ASSESSMENT
[facility]  Chart Reviewed-Full Consult to follow for any immediate concerns please fell free to contact us directly at  [phone] and have us paged or text my cell # [phone]  Eben Pena MD PhD   68yo M  pw abdominal pain, chills and vomiting, was seen in ED 6/12 for the same, He experienced the same pain in September (was in [place] at the time-he is from [facility]) - where he was told he had gallstones, a dilated common bile duct, and underwent an ERCP (states a stone was removed) Since being back in [place], the pt was seeing Dr. Ramsay who told him to follow a low fat diet which he reports doing for the last 1 year. Admits to chills, nausea, vomiting (x7 - food contents, no hematemesis), in ED US and CT noted dilated CBD, and gallstones no cholecystitis and no LFT or bili abnormality, was seen by surgery, no intervention recommended, was told to rusty ramsay. pt states he called dr ramsay office but with continued vomiting and was told to go to ED. ERCP scheduled for 6/14 but pt deferred. Noted to have positive blood cultures and ID consulted.  CT-New pneumobilia, likely related to recent passage of a ductal stone. New periportal edema, perihepatic fluid, and gallbladder  inflammation    RECOMMENDATIONS  Suspect pt passed biliary stone (acute biliary stone cholecystitis) and that the Klebsiella bacteremia is from a biliary source. No resistance genes detected so Zosyn is reasonable but could also consider de-escalation to Ceftriasone/Metronidazole. Appears to have decompressed so while ERCP would be ideal perhaps reasonable for pt to defer for the moment and continue with abx. If pt has resolved the obstruction as he seems to have based on clinical and imaging as well as biochemically we can hope to transition to PO but in the context of GNR bacteremia (Klebsiella bacteremia) in this case would agree with repeat blood cultures to document clearance.    Thank you for consulting us and involving us in the management of this most interesting and challenging case.  We will follow along in the care of this patient. Please call us at [phone] or text me directly on my cell# at [phone] with any concerns.

## 2022-06-14 NOTE — DIETITIAN INITIAL EVALUATION ADULT - PERTINENT MEDS FT
MEDICATIONS  (STANDING):  cefTRIAXone   IVPB 1000 milliGRAM(s) IV Intermittent every 24 hours  chlorhexidine 4% Liquid 1 Application(s) Topical <User Schedule>  enoxaparin Injectable 40 milliGRAM(s) SubCutaneous every 24 hours  influenza  Vaccine (HIGH DOSE) 0.7 milliLiter(s) IntraMuscular once  lactated ringers. 1000 milliLiter(s) (150 mL/Hr) IV Continuous <Continuous>  metroNIDAZOLE    Tablet 500 milliGRAM(s) Oral every 8 hours  pantoprazole  Injectable 40 milliGRAM(s) IV Push daily    MEDICATIONS  (PRN):  acetaminophen     Tablet .. 650 milliGRAM(s) Oral every 6 hours PRN Temp greater or equal to 38C (100.4F), Moderate Pain (4 - 6)

## 2022-06-14 NOTE — DISCHARGE NOTE PROVIDER - CARE PROVIDER_API CALL
Dennis Cruz ()  Internal Medicine  [address] [place] [address]  Phone: [phone]  Fax: [phone]  Established Patient  Follow Up Time: 1 week   Dennis Cruz ()  Internal Medicine  [address] [place] [address]  Phone: [phone]  Fax: [phone]  Established Patient  Follow Up Time: 1 week    Sam Palacios)  ColonRectal Surgery; Surgery  [address] [place] [address]  Phone: [phone]  Fax: [phone]  Follow Up Time: 1 week

## 2022-06-14 NOTE — PROGRESS NOTE ADULT - ATTENDING COMMENTS
67 year old male here with abdominal pain, vomiting, and fever. Admitted to ICU for sepsis and hypotension. Found to have klebsiella bacteremia. CT imaging shows new pneumobilia, likely related to recent passage of a ductal stone, mew periportal edema, perihepatic fluid, and gallbladder   inflammation. Suspect biliary source of infection. Patient defers ERCP. Appreciate ID input. Will change Zosyn to ceftriaxone and metronidazole. Discontinue midodrine. Monitor BP. Follow up with surgery. Continue supportive care. 67 year old male here with abdominal pain, vomiting, and fever. Admitted to ICU for sepsis and hypotension secondary to cholecystitis vs cholangitis. Found to have klebsiella bacteremia. CT imaging shows new pneumobilia, likely related to recent passage of a ductal stone, new periportal edema, perihepatic fluid, and gallbladder inflammation. Suspect biliary source of infection. Patient defers ERCP. Will change Zosyn to ceftriaxone and metronidazole as per ID. Discontinue midodrine. Monitor BP. Follow up with surgery, GI. Continue supportive care. 67 year old male here with abdominal pain, vomiting, and fever. Admitted to ICU for sepsis and hypotension secondary to acute cholecystitis vs cholangitis. Found to have klebsiella bacteremia. CT imaging shows new pneumobilia, likely related to recent passage of a ductal stone, new periportal edema, perihepatic fluid, and gallbladder inflammation. Suspect biliary source of infection. Patient defers ERCP. Will change Zosyn to ceftriaxone and metronidazole as per ID. Discontinue midodrine. Monitor BP. Follow up with surgery, GI. Continue supportive care. 67 year old male here with abdominal pain, vomiting, and fever. Admitted to ICU for sepsis and hypotension secondary to acute cholecystitis vs cholangitis. Found to have klebsiella bacteremia. CT imaging shows new pneumobilia, likely related to recent passage of a ductal stone, new periportal edema, perihepatic fluid, and gallbladder inflammation. Suspect biliary source of infection. Patient defers ERCP. Will change Zosyn to ceftriaxone and metronidazole as per ID. Discontinue midodrine. Will monitor BP. Follow up with surgery, GI. Continue supportive care.

## 2022-06-14 NOTE — DIETITIAN INITIAL EVALUATION ADULT - PERTINENT LABORATORY DATA
06-14    135  |  106  |  14  ----------------------------<  95  3.5   |  22  |  0.84    Ca    7.5<L>      14 Jun 2022 07:13  Phos  2.1     06-14  Mg     1.9     06-14    TPro  5.3<L>  /  Alb  2.5<L>  /  TBili  0.7  /  DBili  x   /  AST  25  /  ALT  29  /  AlkPhos  65  06-14

## 2022-06-14 NOTE — CONSULT NOTE ADULT - SUBJECTIVE AND OBJECTIVE BOX
[facility] DIVISION of INFECTIOUS DISEASE  Eben Pena MD PhD, Neva Marinelli MD, Nancy Miller MD, Isabel Kenny MD, Hermann Sales MD  and providing coverage with Varsha Harris MD  Providing Infectious Disease Consultations at [facility], [facility], [place], [place], [facility], [facility]'s    Office# [phone] to schedule follow up appointments  Answering Service for urgent calls or New Consults [phone]  Cell# to text for urgent issues Eben Pena [phone]     HPI:   68yo M  pw abdominal pain, chills and vomiting, was seen in ED  for the same, He experienced the same pain in September (was in [facility] at the time-he is from [place] [facility]) - where he was told he had gallstones, a dilated common bile duct, and underwent an ERCP (states a stone was removed) Since being back in [place], the pt was seeing Dr. Ramsay who told him to follow a low fat diet which he reports doing for the last 1 year. Admits to chills, nausea, vomiting (x7 - food contents, no hematemesis), in ED US and CT noted dilated CBD, and gallstones no cholecystitis and no LFT or bili abnormality, was seen by surgery, no intervention recommended, was told to rusty ramsay. pt states he called dr ramsay office but with continued vomiting and was told to go to ED. ERCP scheduled for  but pt deferred. Noted to have positive blood cultures and ID consulted.      PAST MEDICAL & SURGICAL HISTORY:  HTN (hypertension)    No significant past surgical history    Antimicrobials  piperacillin/tazobactam IVPB.. 3.375 Gram(s) IV Intermittent every 8 hours      Immunological      Other  acetaminophen     Tablet .. 650 milliGRAM(s) Oral every 6 hours PRN  chlorhexidine 4% Liquid 1 Application(s) Topical <User Schedule>  lactated ringers. 1000 milliLiter(s) IV Continuous <Continuous>  pantoprazole  Injectable 40 milliGRAM(s) IV Push daily  potassium phosphate IVPB 15 milliMole(s) IV Intermittent once      Allergies    No Known Allergies    Intolerances        SOCIAL HISTORY:  lives at home  non smoker (2022 15:25)      FAMILY HISTORY:  No pertinent family history in first degree relatives        ROS:    EYES:  Negative  blurry vision or double vision  GASTROINTESTINAL:  Negative for nausea, diarrhea  -otherwise negative except for subjective    Vital Signs Last 24 Hrs  T(C): 36.9 (2022 10:00), Max: 38.4 (2022 14:27)  T(F): 98.4 (2022 10:00), Max: 101.2 (2022 14:27)  HR: 48 (2022 11:00) (48 - 87)  BP: 117/56 (2022 11:00) (82/50 - 126/66)  BP(mean): 81 (2022 11:00) (65 - 89)  RR: 20 (2022 11:00) (16 - 26)  SpO2: 98% (2022 11:00) (95% - 100%)    PE:  WDWN in no distress  HEENT:  NC, PERRL, sclerae anicteric, conjunctivae clear, EOMI.  Sinuses nontender, no nasal exudate.  No buccal or pharyngeal lesions, erythema or exudate  Neck:  Supple, no adenopathy  Lungs:  No accessory muscle use, bilaterally clear to auscultation  Cor:  distant  Abd:  Symmetric, normoactive BS.  Soft, nontender, no masses, guarding or rebound.  Liver and spleen not enlarged  Extrem:  No cyanosis or edema  Skin:  No rashes.  Neuro: grossly intact  Musc: moving all limbs freely, no focal deficits        LABS:                        11.9   17.32 )-----------( 138      ( 2022 07:13 )             34.9       WBC Count: 17.32 K/uL (22 @ 07:13)  WBC Count: 19.26 K/uL (22 @ 21:12)  WBC Count: 8.00 K/uL (22 @ 12:30)  WBC Count: 10.07 K/uL (22 @ 18:48)          135  |  106  |  14  ----------------------------<  95  3.5   |  22  |  0.84    Ca    7.5<L>      2022 07:13  Phos  2.1       Mg     1.9         TPro  5.3<L>  /  Alb  2.5<L>  /  TBili  0.7  /  DBili  x   /  AST  25  /  ALT  29  /  AlkPhos  65        Creatinine, Serum: 0.84 mg/dL (22 @ 07:13)  Creatinine, Serum: 0.88 mg/dL (22 @ 21:12)  Creatinine, Serum: 0.85 mg/dL (22 @ 12:30)  Creatinine, Serum: 0.87 mg/dL (22 @ 18:48)      Urinalysis Basic - ( 2022 14:29 )    Color: Yellow / Appearance: Clear / S.010 / pH: x  Gluc: x / Ketone: Negative  / Bili: Negative / Urobili: Negative   Blood: x / Protein: 30 mg/dL / Nitrite: Negative   Leuk Esterase: Negative / RBC: >50 /HPF / WBC 0-2   Sq Epi: x / Non Sq Epi: Occasional / Bacteria: Occasional              MICROBIOLOGY:    Culture - Blood (22 @ 17:51)    -  Klebsiella oxytoca: Detec    Gram Stain:   Growth in aerobic bottle: Gram Variable Rods    Specimen Source: .Blood Blood-Peripheral    Organism: Blood Culture PCR    Culture Results:   Growth in aerobic bottle: Gram Variable Rods  ***Blood Panel PCR results on this specimen are available  approximately 3 hours after the Gram stain result.***  Gram stain, PCR, and/or culture results may not always  correspond due to difference in methodologies.  ************************************************************  This PCR assay was performed by multiplex PCR. This  Assay tests for 66 bacterial and resistance gene targets.  Please refer to the [facility] test directory  at [email or web address] for details.    Organism Identification: Blood Culture PCR    Method Type: PCR        RADIOLOGY & ADDITIONAL STUDIES:    --< from: CT Abdomen and Pelvis w/ IV Cont (22 @ 18:03) >    ACC: [id number] EXAM:  CT ABDOMEN AND PELVIS IC                          PROCEDURE DATE:  2022          INTERPRETATION:  CLINICAL INFORMATION: Fever, sepsis, abdominal pain.   History of CBD stones and ERCP and in the area in 2021.    COMPARISON: Noncontrast CT of the abdomen and pelvis performed one day   earlier and contrast-enhanced CT of the abdomen and pelvis dated April   15, 2011 and correlation with right upper quadrant ultrasound dated 2022.    CONTRAST/COMPLICATIONS:  IV Contrast: Omnipaque 350  90 cc administered   10 cc discarded  Oral Contrast: NONE  Complications: None reported at time of study completion    PROCEDURE:  CT of the Abdomen and Pelvis was performed.  Sagittal and coronal reformats were performed.    FINDINGS:  LOWER CHEST: Within normal limits.    LIVER: Again is noted an 8 mm hypodensity in the right lobe of the liver   inferiorly, favored to represent a hemangioma. There is mild periportal   edema. There is trace perihepatic fluid. Otherwise, within normal limits.  BILE DUCTS: There is new moderate pneumobilia without biliary dilatation.   No definite abnormal enhancement of the biliary tree is identified.  GALLBLADDER: There is distention of the gallbladder with diffuse wall   thickening and enhancement and mild pericholecystic stranding. There is a   layering 11 mm gallstone.  SPLEEN: Within normal limits.  PANCREAS: Within normal limits.  ADRENALS: Within normal limits.  KIDNEYS/URETERS: Again are noted left renal cysts. Mild bilateral   perinephric stranding. Otherwise, within normal limits.    BLADDER: Within normal limits.  REPRODUCTIVE ORGANS: The prostate is enlarged.    BOWEL: No bowel obstruction. Appendix is normal.  PERITONEUM: Trace fluid tracking into the pelvis.  VESSELS: Within normal limits.  RETROPERITONEUM/LYMPH NODES: No lymphadenopathy.  ABDOMINAL WALL: Tiny fat-containing umbilical hernia.  BONES: Within normal limits.    IMPRESSION: New pneumobilia, likely related to recent passage of a ductal   stone. New periportal edema, perihepatic fluid, and gallbladder   inflammation. Differential considerations include mild cholangitis and/or   cholecystitis. Results discussed with Dr. Truong in the emergency   department at time of interpretation.    < from: US Abdomen Upper Quadrant Right (22 @ 14:08) >  ACC: [id number] EXAM:  US ABDOMEN RT UPR QUADRANT                          PROCEDURE DATE:  2022          INTERPRETATION:  CLINICAL INFORMATION: Vomiting. History of gallstones.    COMPARISON: 2022.    TECHNIQUE: Sonography of the right upper quadrant.    FINDINGS:  Liver: 1.1 cm sized echogenic focus right hepatic lobe. It may represent   a hemangioma.  Bile ducts: Normal caliber. Common bile duct measures 6 mm.  Gallbladder: Cholelithiasis. Mildly thickened gallbladder wall measuring   4 mm. No pericholecystic fluid.  Pancreas: Not visualized due to overlying bowel.  Right kidney: 10.6 cm. No hydronephrosis.  Ascites: None.  IVC: Visualized portions are within normal limits.    IMPRESSION:  Cholelithiasis and mildly thickened gallbladder wall.  Small possible right hepatic hemangioma.    < end of copied text >

## 2022-06-14 NOTE — DISCHARGE NOTE PROVIDER - PROVIDER TOKENS
PROVIDER:[TOKEN:[[id number]],FOLLOWUP:[1 week],ESTABLISHEDPATIENT:[T]] PROVIDER:[TOKEN:[[id number]],FOLLOWUP:[1 week],ESTABLISHEDPATIENT:[T]],PROVIDER:[TOKEN:[[id number]],FOLLOWUP:[1 week]]

## 2022-06-14 NOTE — DIETITIAN INITIAL EVALUATION ADULT - OTHER INFO
GI/Intake:   -NPO status this AM; no noted on regular diet   -No BM documented thus far   -Pt c/o vomiting and abd pain PTA   -Pt presented with cbd stones; now passed     Renal:   -Lactated ringers ordered for maintenance noted

## 2022-06-14 NOTE — PROGRESS NOTE ADULT - NSPROGADDITIONALINFOA_GEN_ALL_CORE
patient refused ercp despite being admitted to ICU for cholangitis and septic shock  he wanted to eat  he had a cookie and had severe abdominal pain  he regrets eating  will attempt to do ercp in am if there is or availability  d/w patient and family bedside

## 2022-06-14 NOTE — DISCHARGE NOTE PROVIDER - HOSPITAL COURSE
FROM ADMISSION H+P:   HPI:   68yo M  pw abdominal pain, chills and vomiting, was seen in ED yesterday for the same, He experienced the same pain in September (was in [place] at the time) - where he was told he had gallstones, a dilated common bile duct, and underwent an ERCP (states a stone was removed) Since being back in [place], the pt was seeing Dr. Ramsay who told him to follow a low fat diet. Admits to chills, nausea, vomiting (x7 - food contents, no hematemesis), in ED yesterday US and CT noted dilated CBD, and gallstones no cholecystitis and no LFT or bili abnormality, was seen by surgery, no intervention recommended, was told to rusty w dr ramsay. pt states he called dr ramsay office today bec he continues to vomit and was told to go to ED   (13 Jun 2022 15:25)      ---  HOSPITAL COURSE:     ICU course: Patient with septic shock in the setting of cholangitis. Started on pressor drip for 1 night and removed in the morning. CT abdomen repeat consistent with passage of stone.     ---  CONSULTANTS:   GI: Dr. Ramsay   Surgery: Dr. Baum     ---  TIME SPENT:  I, the attending physician, was physically present for the key portions of the evaluation and management (E/M) service provided. The total amount of time spent reviewing the hospital notes, laboratory values, imaging findings, assessing/counseling the patient, discussing with consultant physicians, social work, nursing staff was -- minutes    ---  Primary care provider was made aware of plan for discharge:      [  ] NO     [  ] YES   Gram negative sepsis due to biliary cholangitis  s/p ercp with stent and partial stone removal  cholecystitis  htn    stable for dc home with strict gi and sx fu    >35 minutes spent on discharge

## 2022-06-14 NOTE — DIETITIAN INITIAL EVALUATION ADULT - REASON FOR ADMISSION
68yo Female with PMH of HTN. Pt admitted on 6/13 c/o abdominal pain, vomiting, chills fever. Pt presenting with cbd stone vs gb disease.

## 2022-06-14 NOTE — PROGRESS NOTE ADULT - SUBJECTIVE AND OBJECTIVE BOX
Patient is a 67y old  Male who presents with a chief complaint of 68yo Female with PMH of HTN. Pt admitted on  c/o abdominal pain, vomiting, chills fever. Pt presenting with cbd stone vs gb disease.      (2022 13:33)      INTERVAL HPI/OVERNIGHT EVENTS: chart noted, pt in icu, clinically improving    MEDICATIONS  (STANDING):  cefTRIAXone   IVPB 1000 milliGRAM(s) IV Intermittent every 24 hours  chlorhexidine 4% Liquid 1 Application(s) Topical <User Schedule>  enoxaparin Injectable 40 milliGRAM(s) SubCutaneous every 24 hours  influenza  Vaccine (HIGH DOSE) 0.7 milliLiter(s) IntraMuscular once  lactated ringers. 1000 milliLiter(s) (150 mL/Hr) IV Continuous <Continuous>  metroNIDAZOLE    Tablet 500 milliGRAM(s) Oral every 8 hours  pantoprazole  Injectable 40 milliGRAM(s) IV Push daily    MEDICATIONS  (PRN):  acetaminophen     Tablet .. 650 milliGRAM(s) Oral every 6 hours PRN Temp greater or equal to 38C (100.4F), Moderate Pain (4 - 6)      Allergies    No Known Allergies    Intolerances        REVIEW OF SYSTEMS:  CONSTITUTIONAL: No fever, weight loss, or fatigue  EYES: No eye pain, visual disturbances  ENMT:  No difficulty hearing, tinnitus, vertigo; No sinus or throat pain  NECK: No pain or stiffness  RESPIRATORY: No cough, wheezing, chills or hemoptysis; No shortness of breath  CARDIOVASCULAR: No chest pain, palpitations, dizziness  GASTROINTESTINAL: No abdominal or epigastric pain. No nausea, vomiting, or hematemesis; No diarrhea or constipation. No melena or hematochezia.  GENITOURINARY: No dysuria, frequency, hematuria, or incontinence  NEUROLOGICAL: No headaches, memory loss, loss of strength, numbness, or tremors  SKIN: No itching, burning  LYMPH NODES: No enlarged glands  MUSCULOSKELETAL: No joint pain or swelling; No muscle, back, or extremity pain  PSYCHIATRIC: No depression, mood swings  HEME/LYMPH: No easy bruising, or bleeding gums  ALLERGY AND IMMUNOLOGIC: No hives    Vital Signs Last 24 Hrs  T(C): 37.2 (2022 12:29), Max: 38.4 (2022 14:27)  T(F): 98.9 (2022 12:29), Max: 101.2 (2022 14:27)  HR: 49 (2022 13:00) (48 - 87)  BP: 126/62 (2022 13:00) (82/50 - 126/66)  BP(mean): 87 (2022 13:00) (65 - 89)  RR: 14 (2022 13:00) (14 - 26)  SpO2: 98% (2022 13:00) (95% - 100%)    PHYSICAL EXAM:  GENERAL: NAD, well-groomed, well-developed  HEAD:  Atraumatic, Normocephalic  EYES: EOMI, PERRLA, conjunctiva and sclera clear  ENMT: No tonsillar erythema, exudates, or enlargement   NECK: Supple, No JVD  NERVOUS SYSTEM:  Alert & Oriented X3, Good concentration  CHEST/LUNG: Clear to auscultation bilaterally; No rales, rhonchi, wheezing  HEART: Regular rate and rhythm  ABDOMEN: Soft, Nontender, Nondistended; Bowel sounds present  EXTREMITIES:  2+ Peripheral Pulses   LYMPH: No lymphadenopathy noted  SKIN: No rashes     LABS:                        11.9   17.32 )-----------( 138      ( 2022 07:13 )             34.9     2022 07:13    135    |  106    |  14     ----------------------------<  95     3.5     |  22     |  0.84     Ca    7.5        2022 07:13  Phos  2.1       2022 07:13  Mg     1.9       2022 07:13    TPro  5.3    /  Alb  2.5    /  TBili  0.7    /  DBili  x      /  AST  25     /  ALT  29     /  AlkPhos  65     2022 07:13    PT/INR - ( 2022 12:30 )   PT: 14.7 sec;   INR: 1.25 ratio         PTT - ( 2022 12:30 )  PTT:26.3 sec  Urinalysis Basic - ( 2022 14:29 )    Color: Yellow / Appearance: Clear / S.010 / pH: x  Gluc: x / Ketone: Negative  / Bili: Negative / Urobili: Negative   Blood: x / Protein: 30 mg/dL / Nitrite: Negative   Leuk Esterase: Negative / RBC: >50 /HPF / WBC 0-2   Sq Epi: x / Non Sq Epi: Occasional / Bacteria: Occasional      CAPILLARY BLOOD GLUCOSE        blood culture --   Growth in aerobic bottle: Gram Variable Rods  ***Blood Panel PCR results on this specimen are available  approximately 3 hours after the Gram stain result.***  Gram stain, PCR, and/or culture results may not always  correspond due to difference in methodologies.  ************************************************************  This PCR assay was performed by multiplex PCR. This  Assay tests for 66 bacterial and resistance gene targets.  Please refer to the [facility] test directory  at [email or web address] for details.    17:51    blood culture --   <10,000 CFU/mL Normal Urogenital Renetta   :50      urine culture --   17:51  results   Growth in aerobic bottle: Gram Variable Rods  ***Blood Panel PCR results on this specimen are available  approximately 3 hours after the Gram stain result.***  Gram stain, PCR, and/or culture results may not always  correspond due to difference in methodologies.  ************************************************************  This PCR assay was performed by multiplex PCR. This  Assay tests for 66 bacterial and resistance gene targets.  Please refer to the [facility] [employer] test directory  at [email or web address] for details.  17:51  urine culture --   17:50  results   <10,000 CFU/mL Normal Urogenital Renetta  17:50    wound with gram statin --     17:51  organism  Blood Culture PCR    17:51  specimen source .Blood Blood-Peripheral   17:51  wound with gram statin --     17:50  organism  --   06-13 @ 17:50  specimen source Clean Catch Clean Catch (Midstream)   @ 17:50      RADIOLOGY & ADDITIONAL TESTS:      Consultant(s) Notes Reviewed:  [x ] YES  [ ] NO    Care Discussed with Consultants/Other Providers [x ] YES  [ ] NO    Advanced care planning discussed with patient and family, advanced care planning forms reviewed, discussed, and completed.  20 minutes spent.

## 2022-06-14 NOTE — PROGRESS NOTE ADULT - SUBJECTIVE AND OBJECTIVE BOX
[facility]  Denilson Painter PA-C  [facility] [place] [address]  [phone]      INTERVAL HPI/OVERNIGHT EVENTS:  Pt s/e in ICU  D/w ICU team he tried to eat and had abdominal pain    MEDICATIONS  (STANDING):  cefTRIAXone   IVPB 1000 milliGRAM(s) IV Intermittent every 24 hours  chlorhexidine 4% Liquid 1 Application(s) Topical <User Schedule>  enoxaparin Injectable 40 milliGRAM(s) SubCutaneous every 24 hours  influenza  Vaccine (HIGH DOSE) 0.7 milliLiter(s) IntraMuscular once  lactated ringers. 1000 milliLiter(s) (150 mL/Hr) IV Continuous <Continuous>  metroNIDAZOLE  IVPB 500 milliGRAM(s) IV Intermittent every 8 hours  pantoprazole  Injectable 40 milliGRAM(s) IV Push daily    MEDICATIONS  (PRN):  acetaminophen     Tablet .. 650 milliGRAM(s) Oral every 6 hours PRN Temp greater or equal to 38C (100.4F), Moderate Pain (4 - 6)      Allergies  No Known Allergies    PHYSICAL EXAM:   Vital Signs:  Vital Signs Last 24 Hrs  T(C): 37.2 (2022 12:29), Max: 37.3 (2022 19:40)  T(F): 98.9 (2022 12:29), Max: 99.1 (2022 19:40)  HR: 92 (2022 15:00) (48 - 92)  BP: 144/82 (2022 15:00) (82/50 - 144/82)  BP(mean): 106 (2022 15:00) (65 - 106)  RR: 22 (2022 15:00) (14 - 26)  SpO2: 98% (2022 15:00) (95% - 100%)  Daily Height in cm: 170.18 (2022 04:00)    Daily Weight in k.9 (2022 04:00)    GENERAL:  Appears stated age  ABDOMEN:  Soft, non-tender, non-distended  NEURO:  Alert      LABS:                        11.9   17.32 )-----------( 138      ( 2022 07:13 )             34.9     06-14    135  |  106  |  14  ----------------------------<  95  3.5   |  22  |  0.84    Ca    7.5<L>      2022 07:13  Phos  2.1     06-14  Mg     1.9     -14    TPro  5.3<L>  /  Alb  2.5<L>  /  TBili  0.7  /  DBili  x   /  AST  25  /  ALT  29  /  AlkPhos  65  06-14    PT/INR - ( 2022 12:30 )   PT: 14.7 sec;   INR: 1.25 ratio         PTT - ( 2022 12:30 )  PTT:26.3 sec  Urinalysis Basic - ( 2022 14:29 )    Color: Yellow / Appearance: Clear / S.010 / pH: x  Gluc: x / Ketone: Negative  / Bili: Negative / Urobili: Negative   Blood: x / Protein: 30 mg/dL / Nitrite: Negative   Leuk Esterase: Negative / RBC: >50 /HPF / WBC 0-2   Sq Epi: x / Non Sq Epi: Occasional / Bacteria: Occasional

## 2022-06-14 NOTE — PROGRESS NOTE ADULT - SUBJECTIVE AND OBJECTIVE BOX
CHARTING IN PROGRESS     Patient is a 67y old  Male who presents with a chief complaint of abd pain with fever (2022 20:23)    24 hour events: ***    REVIEW OF SYSTEMS  Constitutional: No fever, chills, fatigue  Neuro: No headache, numbness, weakness  Resp: No cough, wheezing, shortness of breath  CVS: No chest pain, palpitations, leg swelling  GI: No abdominal pain, nausea, vomiting, diarrhea   : No dysuria, frequency, incontinence  Skin: No itching, burning, rashes, or lesions   Msk: No joint pain or swelling  Psych: No depression, anxiety, mood swings  Heme: No bleeding    T(F): 98.8 (22 @ 04:00), Max: 104.9 (22 @ 10:45)  HR: 73 (22 @ 05:00) (62 - 87)  BP: 105/56 (22 @ 05:00) (82/50 - 119/57)  RR: 26 (22 @ 05:00) (16 - 26)  SpO2: 98% (22 @ 05:00) (97% - 100%)  Wt(kg): --            I&O's Summary     @ 07:01  -   @ 07:00  --------------------------------------------------------  IN: 1350 mL / OUT: 1900 mL / NET: -550 mL      PHYSICAL EXAM  General:   CNS:   HEENT:   Resp:   CVS:   Abd:   Ext:   Skin:     MEDICATIONS  piperacillin/tazobactam IVPB.. IV Intermittent    midodrine Oral  norepinephrine Infusion IV Continuous        acetaminophen     Tablet .. Oral PRN      enoxaparin Injectable SubCutaneous    pantoprazole  Injectable IV Push      lactated ringers. IV Continuous      chlorhexidine 4% Liquid Topical                            11.9   19.26 )-----------( 146      ( 2022 21:12 )             34.9       14    135  |  106  |  14  ----------------------------<  95  3.5   |  22  |  0.84    Ca    7.5<L>      2022 07:13  Phos  2.1     -14  Mg     1.9     -14    TPro  5.3<L>  /  Alb  2.5<L>  /  TBili  0.7  /  DBili  x   /  AST  25  /  ALT  29  /  AlkPhos  65  -14    Lactate 1.2           -14 @ 07:13    Lactate 1.6            @ 16:21    Lactate 4.5           - @ 12:30          PT/INR - ( 2022 12:30 )   PT: 14.7 sec;   INR: 1.25 ratio         PTT - ( 2022 12:30 )  PTT:26.3 sec  Urinalysis Basic - ( 2022 14:29 )    Color: Yellow / Appearance: Clear / S.010 / pH: x  Gluc: x / Ketone: Negative  / Bili: Negative / Urobili: Negative   Blood: x / Protein: 30 mg/dL / Nitrite: Negative   Leuk Esterase: Negative / RBC: >50 /HPF / WBC 0-2   Sq Epi: x / Non Sq Epi: Occasional / Bacteria: Occasional      .Blood Blood-Peripheral   Growth in aerobic bottle: Gram Variable Rods  ***Blood Panel PCR results on this specimen are available  approximately 3 hours after the Gram stain result.***  Gram stain, PCR, and/or culture results may not always  correspond due to difference in methodologies.  ************************************************************  This PCR assay was performed by multiplex PCR. This  Assay tests for 66 bacterial and resistance gene targets.  Please refer to the [facility] test directory  at [email or web address] for details.   Growth in aerobic bottle: Gram Variable Rods  @ 17:51        Radiology: ***  Bedside lung ultrasound: ***  Bedside ECHO: ***    CENTRAL LINE: Y/N          DATE INSERTED:              REMOVE: Y/N  SILVESTRE: Y/N                        DATE INSERTED:              REMOVE: Y/N  A-LINE: Y/N                       DATE INSERTED:              REMOVE: Y/N    GLOBAL ISSUE/BEST PRACTICE  Analgesia:   Sedation:   CAM-ICU:   HOB elevation: yes  Stress ulcer prophylaxis:   VTE prophylaxis:   Glycemic control:   Nutrition:     CODE STATUS: ***  [facility] discussion: Y         Patient is a 67y old  Male who presents with a chief complaint of abd pain with fever (2022 20:23)    24 hour events: Patient presented to the ED yesterday with abdominal pain, chills, nausea. CT abdomen with contrast showed new pneumobilia, likely related to recent passage of a ductal stone, new periportal edema, perihepatic fluids and gallbladder inflammation. Patient was started on Zosyn and was found to be hypotensive s/p 4L IV NS. Patient was started on LR and transferred to ICU for pressors. Patient seen and examined at bedside. States "I feel perfectly fine. I have no pain or nausea."     REVIEW OF SYSTEMS  Constitutional: No fever, chills  GI: No abdominal pain, nausea      T(F): 98.8 (22 @ 04:00), Max: 104.9 (22 @ 10:45)  HR: 73 (22 @ 05:00) (62 - 87)  BP: 105/56 (22 @ 05:00) (82/50 - 119/57)  RR: 26 (22 @ 05:00) (16 - 26)  SpO2: 98% (22 @ 05:00) (97% - 100%)  Wt(kg): --            I&O's Summary     @ 07:01  -   @ 07:00  --------------------------------------------------------  IN: 1350 mL / OUT: 1900 mL / NET: -550 mL      PHYSICAL EXAM  General: well-appearing, seated on edge of bed  CNS: AAOx3, appropriately responsive to questions and commands  HEENT: NCAT  Resp: CTA b/l  CVS: RRR, S1, S2  Abd: soft, nondistended, no guarding  Ext: no cyanosis or edema    MEDICATIONS  piperacillin/tazobactam IVPB.. IV Intermittent    midodrine Oral  norepinephrine Infusion IV Continuous        acetaminophen     Tablet .. Oral PRN      enoxaparin Injectable SubCutaneous    pantoprazole  Injectable IV Push      lactated ringers. IV Continuous      chlorhexidine 4% Liquid Topical                            11.9   19.26 )-----------( 146      ( 2022 21:12 )             34.9       06-14    135  |  106  |  14  ----------------------------<  95  3.5   |  22  |  0.84    Ca    7.5<L>      2022 07:13  Phos  2.1     -14  Mg     1.9     -14    TPro  5.3<L>  /  Alb  2.5<L>  /  TBili  0.7  /  DBili  x   /  AST  25  /  ALT  29  /  AlkPhos  65  06-14    Lactate 1.2           -14 @ 07:13    Lactate 1.6            @ 16:21    Lactate 4.5            @ 12:30          PT/INR - ( 2022 12:30 )   PT: 14.7 sec;   INR: 1.25 ratio         PTT - ( 2022 12:30 )  PTT:26.3 sec  Urinalysis Basic - ( 2022 14:29 )    Color: Yellow / Appearance: Clear / S.010 / pH: x  Gluc: x / Ketone: Negative  / Bili: Negative / Urobili: Negative   Blood: x / Protein: 30 mg/dL / Nitrite: Negative   Leuk Esterase: Negative / RBC: >50 /HPF / WBC 0-2   Sq Epi: x / Non Sq Epi: Occasional / Bacteria: Occasional      .Blood Blood-Peripheral   Growth in aerobic bottle: Gram Variable Rods  ***Blood Panel PCR results on this specimen are available  approximately 3 hours after the Gram stain result.***  Gram stain, PCR, and/or culture results may not always  correspond due to difference in methodologies.  ************************************************************  This PCR assay was performed by multiplex PCR. This  Assay tests for 66 bacterial and resistance gene targets.  Please refer to the [facility] test directory  at [email or web address] for details.   Growth in aerobic bottle: Gram Variable Rods - @ 17:51        Radiology:   < from: CT Abdomen and Pelvis w/ IV Cont (22 @ 18:03) >  IMPRESSION: New pneumobilia, likely related to recent passage of a ductal   stone. New periportal edema, perihepatic fluid, and gallbladder   inflammation. Differential considerations include mild cholangitis and/or   cholecystitis. Results discussed with Dr. Truong in the emergency   department at time of interpretation.    < end of copied text >  < from: US Abdomen Upper Quadrant Right (22 @ 14:08) >  IMPRESSION:  Cholelithiasis and mildly thickened gallbladder wall.  Small possible right hepatic hemangioma.    < end of copied text >  < from: Xray Chest 1 View- PORTABLE-Urgent (Xray Chest 1 View- PORTABLE-Urgent .) (22 @ 13:30) >  IMPRESSION: Negative chest.    --- End of Report ---    < end of copied text >    CENTRAL LINE: N     SILVESTRE: N                        A-LINE: N     GLOBAL ISSUE/BEST PRACTICE  Analgesia: N  Sedation: N  HOB elevation: yes  Stress ulcer prophylaxis: Y  VTE prophylaxis: Y  Glycemic control: N  Nutrition:

## 2022-06-15 LAB
ALBUMIN SERPL ELPH-MCNC: 2.4 G/DL — LOW (ref 3.3–5)
ALP SERPL-CCNC: 92 U/L — SIGNIFICANT CHANGE UP (ref 40–120)
ALT FLD-CCNC: 30 U/L — SIGNIFICANT CHANGE UP (ref 12–78)
ANION GAP SERPL CALC-SCNC: 7 MMOL/L — SIGNIFICANT CHANGE UP (ref 5–17)
AST SERPL-CCNC: 29 U/L — SIGNIFICANT CHANGE UP (ref 15–37)
BASOPHILS # BLD AUTO: 0.04 K/UL — SIGNIFICANT CHANGE UP (ref 0–0.2)
BASOPHILS NFR BLD AUTO: 0.3 % — SIGNIFICANT CHANGE UP (ref 0–2)
BILIRUB SERPL-MCNC: 0.6 MG/DL — SIGNIFICANT CHANGE UP (ref 0.2–1.2)
BUN SERPL-MCNC: 9 MG/DL — SIGNIFICANT CHANGE UP (ref 7–23)
CALCIUM SERPL-MCNC: 8.1 MG/DL — LOW (ref 8.5–10.1)
CHLORIDE SERPL-SCNC: 105 MMOL/L — SIGNIFICANT CHANGE UP (ref 96–108)
CO2 SERPL-SCNC: 24 MMOL/L — SIGNIFICANT CHANGE UP (ref 22–31)
CREAT SERPL-MCNC: 0.71 MG/DL — SIGNIFICANT CHANGE UP (ref 0.5–1.3)
EGFR: 102 ML/MIN/1.73M2 — SIGNIFICANT CHANGE UP
EOSINOPHIL # BLD AUTO: 0 K/UL — SIGNIFICANT CHANGE UP (ref 0–0.5)
EOSINOPHIL NFR BLD AUTO: 0 % — SIGNIFICANT CHANGE UP (ref 0–6)
GLUCOSE SERPL-MCNC: 79 MG/DL — SIGNIFICANT CHANGE UP (ref 70–99)
GRAM STN FLD: SIGNIFICANT CHANGE UP
HCT VFR BLD CALC: 37.2 % — LOW (ref 39–50)
HGB BLD-MCNC: 12.2 G/DL — LOW (ref 13–17)
IMM GRANULOCYTES NFR BLD AUTO: 0.6 % — SIGNIFICANT CHANGE UP (ref 0–1.5)
LYMPHOCYTES # BLD AUTO: 0.8 K/UL — LOW (ref 1–3.3)
LYMPHOCYTES # BLD AUTO: 5.3 % — LOW (ref 13–44)
MAGNESIUM SERPL-MCNC: 2 MG/DL — SIGNIFICANT CHANGE UP (ref 1.6–2.6)
MCHC RBC-ENTMCNC: 29 PG — SIGNIFICANT CHANGE UP (ref 27–34)
MCHC RBC-ENTMCNC: 32.8 GM/DL — SIGNIFICANT CHANGE UP (ref 32–36)
MCV RBC AUTO: 88.6 FL — SIGNIFICANT CHANGE UP (ref 80–100)
MONOCYTES # BLD AUTO: 1.69 K/UL — HIGH (ref 0–0.9)
MONOCYTES NFR BLD AUTO: 11.2 % — SIGNIFICANT CHANGE UP (ref 2–14)
NEUTROPHILS # BLD AUTO: 12.45 K/UL — HIGH (ref 1.8–7.4)
NEUTROPHILS NFR BLD AUTO: 82.6 % — HIGH (ref 43–77)
NRBC # BLD: 0 /100 WBCS — SIGNIFICANT CHANGE UP (ref 0–0)
PHOSPHATE SERPL-MCNC: 1.9 MG/DL — LOW (ref 2.5–4.5)
PLATELET # BLD AUTO: 106 K/UL — LOW (ref 150–400)
POTASSIUM SERPL-MCNC: 3.5 MMOL/L — SIGNIFICANT CHANGE UP (ref 3.5–5.3)
POTASSIUM SERPL-SCNC: 3.5 MMOL/L — SIGNIFICANT CHANGE UP (ref 3.5–5.3)
PROT SERPL-MCNC: 5.5 G/DL — LOW (ref 6–8.3)
RBC # BLD: 4.2 M/UL — SIGNIFICANT CHANGE UP (ref 4.2–5.8)
RBC # FLD: 12.7 % — SIGNIFICANT CHANGE UP (ref 10.3–14.5)
SODIUM SERPL-SCNC: 136 MMOL/L — SIGNIFICANT CHANGE UP (ref 135–145)
WBC # BLD: 15.07 K/UL — HIGH (ref 3.8–10.5)
WBC # FLD AUTO: 15.07 K/UL — HIGH (ref 3.8–10.5)

## 2022-06-15 PROCEDURE — 99291 CRITICAL CARE FIRST HOUR: CPT | Mod: GC

## 2022-06-15 DEVICE — STENT BIL ADVANIX 10FRX7CM PRELOADED
Type: IMPLANTABLE DEVICE | Status: NON-FUNCTIONAL
Removed: 2022-06-15

## 2022-06-15 DEVICE — HYDRATOME 44
Type: IMPLANTABLE DEVICE | Status: NON-FUNCTIONAL
Removed: 2022-06-15

## 2022-06-15 DEVICE — CATH BLLN EXTRACT DIST GUIDE WIRE 15MM 3LUM
Type: IMPLANTABLE DEVICE | Status: NON-FUNCTIONAL
Removed: 2022-06-15

## 2022-06-15 RX ORDER — OXYCODONE HYDROCHLORIDE 5 MG/1
5 TABLET ORAL ONCE
Refills: 0 | Status: DISCONTINUED | OUTPATIENT
Start: 2022-06-15 | End: 2022-06-15

## 2022-06-15 RX ORDER — ONDANSETRON 8 MG/1
4 TABLET, FILM COATED ORAL ONCE
Refills: 0 | Status: DISCONTINUED | OUTPATIENT
Start: 2022-06-15 | End: 2022-06-15

## 2022-06-15 RX ORDER — HYDROMORPHONE HYDROCHLORIDE 2 MG/ML
0.5 INJECTION INTRAMUSCULAR; INTRAVENOUS; SUBCUTANEOUS
Refills: 0 | Status: DISCONTINUED | OUTPATIENT
Start: 2022-06-15 | End: 2022-06-15

## 2022-06-15 RX ORDER — POTASSIUM PHOSPHATE, MONOBASIC POTASSIUM PHOSPHATE, DIBASIC 236; 224 MG/ML; MG/ML
30 INJECTION, SOLUTION INTRAVENOUS ONCE
Refills: 0 | Status: COMPLETED | OUTPATIENT
Start: 2022-06-15 | End: 2022-06-15

## 2022-06-15 RX ORDER — SODIUM CHLORIDE 9 MG/ML
1000 INJECTION, SOLUTION INTRAVENOUS
Refills: 0 | Status: DISCONTINUED | OUTPATIENT
Start: 2022-06-15 | End: 2022-06-15

## 2022-06-15 RX ADMIN — CEFTRIAXONE 100 MILLIGRAM(S): 500 INJECTION, POWDER, FOR SOLUTION INTRAMUSCULAR; INTRAVENOUS at 12:03

## 2022-06-15 RX ADMIN — POTASSIUM PHOSPHATE, MONOBASIC POTASSIUM PHOSPHATE, DIBASIC 83.33 MILLIMOLE(S): 236; 224 INJECTION, SOLUTION INTRAVENOUS at 08:17

## 2022-06-15 RX ADMIN — Medication 100 MILLIGRAM(S): at 05:00

## 2022-06-15 RX ADMIN — SODIUM CHLORIDE 75 MILLILITER(S): 9 INJECTION, SOLUTION INTRAVENOUS at 09:12

## 2022-06-15 RX ADMIN — PANTOPRAZOLE SODIUM 40 MILLIGRAM(S): 20 TABLET, DELAYED RELEASE ORAL at 12:03

## 2022-06-15 RX ADMIN — Medication 100 MILLIGRAM(S): at 14:12

## 2022-06-15 RX ADMIN — Medication 100 MILLIGRAM(S): at 21:13

## 2022-06-15 NOTE — PROVIDER CONTACT NOTE (CRITICAL VALUE NOTIFICATION) - TEST AND RESULT REPORTED:
preliminary result of blood cultures from 6/13
lactate = 4.5
blood culture result from 6/13
+BC growth in aerobic bottle gram variable rods.

## 2022-06-15 NOTE — PROVIDER CONTACT NOTE (CRITICAL VALUE NOTIFICATION) - SITUATION
blood cultures result from 6/13 gram negative rods
blood culture preliminary result from 6/13 growth in aerobic gram negative rods & variable rods and growth in anaerobic gram negative rods

## 2022-06-15 NOTE — PROGRESS NOTE ADULT - ASSESSMENT
66 yo M PMHx HTN presented to ED w/ abdominal pain, chills and vomiting x few days, admitted for sepsis and hypotension in the setting of cholangitis and/or cholecystitis, pending ERCP today.     NEURO:  - AAOx3     CV:  - Hemodynamically stable  - D/c Midodrine and Levophed  - Continue IV LR    RESP:  - No acute issues, sating well on RA     RENAL:  - No active issues, renal function wnl  - Monitor I&Os    GI:  - NPO for now  - Continue Protonix   - Surgery consulted, recommend ERCP     ENDO:  - No acute issues    ID:  - Leukocytosis downtrending, afebrile  - Blood cx positive for Klebsiella  - D/c Zosyn and start Ceftriaxone and Metronidazole   - F/u urine cx    HEME:  - Hold pharmacologic DVT ppx for procedure  - SCDs 68 yo M PMHx HTN presented to ED w/ abdominal pain, chills and vomiting x few days, admitted for sepsis and hypotension in the setting of acute biliary stone cholangitis, found to have Klebsiella bacteremia likely from biliary source. Scheduled for ERCP today.     NEURO:  - AAOx3     CV:  - Hemodynamically stable  - D/c Midodrine and Levophed  - Monitor routine hemodynamics    RESP:  - No acute issues, sating well on RA     RENAL:  - No active issues, renal function wnl  - Monitor I&Os    GI:  - NPO for now  - Pending ERCP today  - Continue Protonix      ENDO:  - No acute issues    ID:  - Blood cx positive for Klebsiella and Strep anginosus  - Urine cx negative  - Continue Ceftriaxone and Metronidazole   - F/u repeat blood cx for sureveillance    HEME:  - Hold Lovenox DVT ppx for procedure  - SCDs

## 2022-06-15 NOTE — PROGRESS NOTE ADULT - ATTENDING COMMENTS
67 year old male here with abdominal pain, vomiting, and fever. Admitted to ICU for sepsis and hypotension secondary to acute cholangitis. Found to have polymicrobial bacteremia. Growing klebsiella oxytoca and strep anginosus. Underwent ERCP today which shows multiple stones and pus in bile duct s/p stent placement. Transferred back to ICU following procedure. Continue ceftriaxone and metronidazole. Follow up repeat cultures. Follow up with surgery re: cholecystectomy. Start CLD as per GI. Continue supportive care.

## 2022-06-15 NOTE — PROGRESS NOTE ADULT - ASSESSMENT
68 yo male with cholangitis  -ERCP today  -Patient offered to have lap shahriar tomorrow, he's not sure he wants to do it that soon. Patient mentioned Dr Pina did surgery on patient's sister, he may want have Dr Pina performs surgery

## 2022-06-15 NOTE — PROGRESS NOTE ADULT - SUBJECTIVE AND OBJECTIVE BOX
Patient is a 67y old  Male who presents with a chief complaint of abd pain with fever (15 Garett 2022 11:43)      INTERVAL HPI/OVERNIGHT EVENTS: events noted, will need gall bladder out on this admission, s/p ercp with stent this am    MEDICATIONS  (STANDING):  cefTRIAXone   IVPB 2000 milliGRAM(s) IV Intermittent every 24 hours  chlorhexidine 4% Liquid 1 Application(s) Topical <User Schedule>  influenza  Vaccine (HIGH DOSE) 0.7 milliLiter(s) IntraMuscular once  lactated ringers. 1000 milliLiter(s) (75 mL/Hr) IV Continuous <Continuous>  lactated ringers. 1000 milliLiter(s) (75 mL/Hr) IV Continuous <Continuous>  metroNIDAZOLE  IVPB 500 milliGRAM(s) IV Intermittent every 8 hours  pantoprazole  Injectable 40 milliGRAM(s) IV Push daily    MEDICATIONS  (PRN):  acetaminophen     Tablet .. 650 milliGRAM(s) Oral every 6 hours PRN Temp greater or equal to 38C (100.4F), Moderate Pain (4 - 6)  HYDROmorphone  Injectable 0.5 milliGRAM(s) IV Push every 10 minutes PRN Severe Pain (7 - 10)  ondansetron Injectable 4 milliGRAM(s) IV Push once PRN Nausea and/or Vomiting  oxyCODONE    IR 5 milliGRAM(s) Oral once PRN Moderate Pain (4 - 6)      Allergies    No Known Allergies    Intolerances        REVIEW OF SYSTEMS:  CONSTITUTIONAL: No fever, weight loss, or fatigue  EYES: No eye pain, visual disturbances  ENMT:  No difficulty hearing, tinnitus, vertigo; No sinus or throat pain  NECK: No pain or stiffness  RESPIRATORY: No cough, wheezing, chills or hemoptysis; No shortness of breath  CARDIOVASCULAR: No chest pain, palpitations, dizziness  GASTROINTESTINAL: No abdominal or epigastric pain. No nausea, vomiting, or hematemesis; No diarrhea or constipation. No melena or hematochezia.  GENITOURINARY: No dysuria, frequency, hematuria, or incontinence  NEUROLOGICAL: No headaches, memory loss, loss of strength, numbness, or tremors  SKIN: No itching, burning  LYMPH NODES: No enlarged glands  MUSCULOSKELETAL: No joint pain or swelling; No muscle, back, or extremity pain  PSYCHIATRIC: No depression, mood swings  HEME/LYMPH: No easy bruising, or bleeding gums  ALLERGY AND IMMUNOLOGIC: No hives    Vital Signs Last 24 Hrs  T(C): 36.9 (15 Garett 2022 11:15), Max: 39.1 (2022 21:00)  T(F): 98.5 (15 Garett 2022 11:15), Max: 102.3 (2022 21:00)  HR: 66 (15 Garett 2022 11:15) (49 - 92)  BP: 132/79 (15 Garett 2022 11:15) (93/55 - 149/68)  BP(mean): 101 (15 Garett 2022 11:15) (71 - 106)  RR: 20 (15 Garett 2022 11:15) (14 - 25)  SpO2: 99% (15 Garett 2022 11:15) (92% - 100%)    PHYSICAL EXAM:  GENERAL: NAD, well-groomed, well-developed  HEAD:  Atraumatic, Normocephalic  EYES: EOMI, PERRLA, conjunctiva and sclera clear  ENMT: No tonsillar erythema, exudates, or enlargement   NECK: Supple, No JVD  NERVOUS SYSTEM:  Alert & Oriented X3, Good concentration  CHEST/LUNG: Clear to auscultation bilaterally; No rales, rhonchi, wheezing  HEART: Regular rate and rhythm  ABDOMEN: Soft, Nontender, Nondistended; Bowel sounds present  EXTREMITIES:  2+ Peripheral Pulses   LYMPH: No lymphadenopathy noted  SKIN: No rashes     LABS:                        12.2   15.07 )-----------( 106      ( 15 Garett 2022 06:46 )             37.2     15 Garett 2022 06:46    136    |  105    |  9      ----------------------------<  79     3.5     |  24     |  0.71     Ca    8.1        15 Garett 2022 06:46  Phos  1.9       15 Garett 2022 06:46  Mg     2.0       15 Garett 2022 06:46    TPro  5.5    /  Alb  2.4    /  TBili  0.6    /  DBili  x      /  AST  29     /  ALT  30     /  AlkPhos  92     15 Garett 2022 06:46    PT/INR - ( 2022 12:30 )   PT: 14.7 sec;   INR: 1.25 ratio         PTT - ( 2022 12:30 )  PTT:26.3 sec  Urinalysis Basic - ( 2022 14:29 )    Color: Yellow / Appearance: Clear / S.010 / pH: x  Gluc: x / Ketone: Negative  / Bili: Negative / Urobili: Negative   Blood: x / Protein: 30 mg/dL / Nitrite: Negative   Leuk Esterase: Negative / RBC: >50 /HPF / WBC 0-2   Sq Epi: x / Non Sq Epi: Occasional / Bacteria: Occasional      CAPILLARY BLOOD GLUCOSE        blood culture --   Growth in aerobic bottle: Gram Negative Rods  Growth in aerobic bottle: Gram Variable Rods  Growth in anaerobic bottle: Gram Negative Rods  ***Blood Panel PCR results on this specimen are available  approximately 3 hours after the Gram stain result.***  Gram stain, PCR, and/or culture results may not always  correspond due to difference in methodologies.  ************************************************************  This PCR assay was performed by multiplex PCR. This  Assay tests for 66 bacterial and resistance gene targets.  Please refer to the [facility] [employer] test directory  at [email or web address] for details.    @ 17:51    blood culture --   <10,000 CFU/mL Normal Urogenital Renetta    @ 17:50      urine culture --   @ 17:51  results   Growth in aerobic bottle: Gram Negative Rods  Growth in aerobic bottle: Gram Variable Rods  Growth in anaerobic bottle: Gram Negative Rods  ***Blood Panel PCR results on this specimen are available  approximately 3 hours after the Gram stain result.***  Gram stain, PCR, and/or culture results may not always  correspond due to difference in methodologies.  ************************************************************  This PCR assay was performed by multiplex PCR. This  Assay tests for 66 bacterial and resistance gene targets.  Please refer to the [facility] [employer] test directory  at [email or web address] for details. 06-13 @ 17:51  urine culture --   @ 17:50  results   <10,000 CFU/mL Normal Urogenital Renetta  @ 17:50    wound with gram statin --     @ 17:51  organism  Blood Culture PCR    @ 17:51  specimen source .Blood Blood-Peripheral   @ 17:51  wound with gram statin --     @ 17:50  organism  --    @ 17:50  specimen source Clean Catch Clean Catch (Midstream)   @ 17:50      RADIOLOGY & ADDITIONAL TESTS:      Consultant(s) Notes Reviewed:  [x ] YES  [ ] NO    Care Discussed with Consultants/Other Providers [x ] YES  [ ] NO    Advanced care planning discussed with patient and family, advanced care planning forms reviewed, discussed, and completed.  20 minutes spent.

## 2022-06-15 NOTE — PROGRESS NOTE ADULT - ASSESSMENT
68yo M  pw abdominal pain, chills and vomiting, was seen in ED 6/12 for the same, He experienced the same pain in September (was in [place] at the time-he is from [facility]) - where he was told he had gallstones, a dilated common bile duct, and underwent an ERCP (states a stone was removed) Since being back in [place], the pt was seeing Dr. Ramsay who told him to follow a low fat diet which he reports doing for the last 1 year. Admits to chills, nausea, vomiting (x7 - food contents, no hematemesis), in ED US and CT noted dilated CBD, and gallstones no cholecystitis and no LFT or bili abnormality, was seen by surgery, no intervention recommended, was told to rusty ramsay. pt states he called dr ramsay office but with continued vomiting and was told to go to ED. ERCP scheduled for 6/14 but pt deferred. Noted to have positive blood cultures and ID consulted.  CT-New pneumobilia, likely related to recent passage of a ductal stone. New periportal edema, perihepatic fluid, and gallbladder  inflammation    RECOMMENDATIONS  acute biliary stone cholecystitis and that the Klebsiella bacteremia is from a biliary source.  6/15-ERCP,SPHINTEROTOMY,STONE REMOVAL, STENT INSERTION,FLUORO - noted pus and stones   continue Ceftriaxone/Metronidazole      polymicrobial bacteremia (Klebsiella bacteremia/Streptococcus anginosus group)    Thank you for consulting us and involving us in the management of this most interesting and challenging case.  We will follow along in the care of this patient. Please call us at [phone] or text me directly on my cell# at [phone] with any concerns.

## 2022-06-15 NOTE — PROGRESS NOTE ADULT - SUBJECTIVE AND OBJECTIVE BOX
68 yo male with cholangitis, afebrile, no abd pain now. Going for ERCP later this am    Abd soft, NT              Vital Signs Last 24 Hrs  T(C): 37.3 (15 Garett 2022 07:48), Max: 39.1 (14 Jun 2022 21:00)  T(F): 99.2 (15 Garett 2022 07:48), Max: 102.3 (14 Jun 2022 21:00)  HR: 71 (15 Garett 2022 07:35) (48 - 92)  BP: 136/67 (15 Garett 2022 07:00) (93/55 - 149/68)  BP(mean): 96 (15 Garett 2022 07:00) (71 - 106)  RR: 19 (15 Garett 2022 07:35) (14 - 25)  SpO2: 98% (15 Garett 2022 07:35) (92% - 100%)                          11.9   17.32 )-----------( 138      ( 14 Jun 2022 07:13 )             34.9       06-15    136  |  105  |  9   ----------------------------<  79  3.5   |  24  |  0.71    Ca    8.1<L>      15 Garett 2022 06:46  Phos  1.9     06-15  Mg     2.0     06-15    TPro  5.5<L>  /  Alb  2.4<L>  /  TBili  0.6  /  DBili  x   /  AST  29  /  ALT  30  /  AlkPhos  92  06-15      LIVER FUNCTIONS - ( 15 Garett 2022 06:46 )  Alb: 2.4 g/dL / Pro: 5.5 g/dL / ALK PHOS: 92 U/L / ALT: 30 U/L / AST: 29 U/L / GGT: x             MEDICATIONS  (STANDING):  cefTRIAXone   IVPB 2000 milliGRAM(s) IV Intermittent every 24 hours  chlorhexidine 4% Liquid 1 Application(s) Topical <User Schedule>  influenza  Vaccine (HIGH DOSE) 0.7 milliLiter(s) IntraMuscular once  lactated ringers. 1000 milliLiter(s) (75 mL/Hr) IV Continuous <Continuous>  metroNIDAZOLE  IVPB 500 milliGRAM(s) IV Intermittent every 8 hours  pantoprazole  Injectable 40 milliGRAM(s) IV Push daily  potassium phosphate IVPB 30 milliMole(s) IV Intermittent once    MEDICATIONS  (PRN):  acetaminophen     Tablet .. 650 milliGRAM(s) Oral every 6 hours PRN Temp greater or equal to 38C (100.4F), Moderate Pain (4 - 6)      MEDICATIONS  (STANDING):  cefTRIAXone   IVPB 2000 milliGRAM(s) IV Intermittent every 24 hours  chlorhexidine 4% Liquid 1 Application(s) Topical <User Schedule>  influenza  Vaccine (HIGH DOSE) 0.7 milliLiter(s) IntraMuscular once  lactated ringers. 1000 milliLiter(s) (75 mL/Hr) IV Continuous <Continuous>  metroNIDAZOLE  IVPB 500 milliGRAM(s) IV Intermittent every 8 hours  pantoprazole  Injectable 40 milliGRAM(s) IV Push daily  potassium phosphate IVPB 30 milliMole(s) IV Intermittent once

## 2022-06-15 NOTE — PROGRESS NOTE ADULT - SUBJECTIVE AND OBJECTIVE BOX
s/p ercp    multiple stones in cbd, partially removed,   pus is bile duct precluded complete removal  stent inserted    rec:   clears  cont iv antibiotics  f/u repeat cx  await [facility]  repeat ercp with stent/stone removal with sphincteroplasty at [facility] as outpatient

## 2022-06-15 NOTE — PROGRESS NOTE ADULT - SUBJECTIVE AND OBJECTIVE BOX
[facility] DIVISION of INFECTIOUS DISEASE  Eben Pena MD PhD, Neva Marinelli MD, Nancy Miller MD, Isabel Kenny MD, Hermann Sales MD  and providing coverage with Varsha Harris MD  Providing Infectious Disease Consultations at [facility], [facility], [facility], [facility]'s    Office# [phone] to schedule follow up appointments  Answering Service for urgent calls or New Consults [phone]  Cell# to text for urgent issues Eben Pena [phone]     infectious diseases progress note:    DIONICIO PACKER is a 67y y. o. Male patient    No concerning overnight events    Allergies    No Known Allergies    Intolerances        ANTIBIOTICS/RELEVANT:  antimicrobials  cefTRIAXone   IVPB 2000 milliGRAM(s) IV Intermittent every 24 hours  metroNIDAZOLE  IVPB 500 milliGRAM(s) IV Intermittent every 8 hours    immunologic:  influenza  Vaccine (HIGH DOSE) 0.7 milliLiter(s) IntraMuscular once    OTHER:  acetaminophen     Tablet .. 650 milliGRAM(s) Oral every 6 hours PRN  chlorhexidine 4% Liquid 1 Application(s) Topical <User Schedule>  HYDROmorphone  Injectable 0.5 milliGRAM(s) IV Push every 10 minutes PRN  lactated ringers. 1000 milliLiter(s) IV Continuous <Continuous>  lactated ringers. 1000 milliLiter(s) IV Continuous <Continuous>  ondansetron Injectable 4 milliGRAM(s) IV Push once PRN  oxyCODONE    IR 5 milliGRAM(s) Oral once PRN  pantoprazole  Injectable 40 milliGRAM(s) IV Push daily      Objective:  Vital Signs Last 24 Hrs  T(C): 36.9 (15 Garett 2022 11:15), Max: 39.1 (2022 21:00)  T(F): 98.5 (15 Garett 2022 11:15), Max: 102.3 (2022 21:00)  HR: 66 (15 Garett 2022 11:15) (49 - 92)  BP: 132/79 (15 Garett 2022 11:15) (93/55 - 149/68)  BP(mean): 101 (15 Garett 2022 11:15) (71 - 106)  RR: 20 (15 Garett 2022 11:15) (14 - 25)  SpO2: 99% (15 Garett 2022 11:15) (92% - 100%)    T(C): 36.9 (06-15-22 @ 11:15), Max: 39.1 (22 @ 21:00)  T(C): 36.9 (06-15-22 @ 11:15), Max: 40.5 (22 @ 10:45)  T(C): 36.9 (06-15-22 @ 11:15), Max: 40.5 (22 @ 10:45)    PHYSICAL EXAM:  HEENT: NC atraumatic  Neck: supple  Respiratory: no accessory muscle use, breathing comfortably  Cardiovascular: distant  Gastrointestinal: normal appearing, nondistended, soft, NT, nl BS  Extremities: no clubbing, no cyanosis,        LABS:                          12.2   15.07 )-----------( 106      ( 15 Garett 2022 06:46 )             37.2       WBC  15.07 06-15 @ 06:46  17.32  @ 07:13  19.26  @ 21:12  8.00  @ 12:30  10.07  @ 18:48      06-15    136  |  105  |  9   ----------------------------<  79  3.5   |  24  |  0.71    Ca    8.1<L>      15 Garett 2022 06:46  Phos  1.9     06-15  Mg     2.0     06-15    TPro  5.5<L>  /  Alb  2.4<L>  /  TBili  0.6  /  DBili  x   /  AST  29  /  ALT  30  /  AlkPhos  92  06-15      Creatinine, Serum: 0.71 mg/dL (06-15-22 @ 06:46)  Creatinine, Serum: 0.84 mg/dL (22 @ 07:13)  Creatinine, Serum: 0.88 mg/dL (22 @ 21:12)  Creatinine, Serum: 0.85 mg/dL (22 @ 12:30)  Creatinine, Serum: 0.87 mg/dL (22 @ 18:48)      PT/INR - ( 2022 12:30 )   PT: 14.7 sec;   INR: 1.25 ratio         PTT - ( 2022 12:30 )  PTT:26.3 sec  Urinalysis Basic - ( 2022 14:29 )    Color: Yellow / Appearance: Clear / S.010 / pH: x  Gluc: x / Ketone: Negative  / Bili: Negative / Urobili: Negative   Blood: x / Protein: 30 mg/dL / Nitrite: Negative   Leuk Esterase: Negative / RBC: >50 /HPF / WBC 0-2   Sq Epi: x / Non Sq Epi: Occasional / Bacteria: Occasional            INFLAMMATORY MARKERS  Auto Neutrophil #: 12.45 K/uL (06-15-22 @ 06:46)  Auto Lymphocyte #: 0.80 K/uL (06-15-22 @ 06:46)  Auto Neutrophil #: 17.25 K/uL (22 @ 21:12)  Auto Lymphocyte #: 0.84 K/uL (22 @ 21:12)  Auto Neutrophil #: 7.54 K/uL (22 @ 12:30)  Auto Lymphocyte #: 0.38 K/uL (22 @ 12:30)  Auto Neutrophil #: 8.52 K/uL (22 @ 18:48)  Auto Lymphocyte #: 1.03 K/uL (22 @ 18:48)    Lactate, Blood: 1.2 mmol/L (22 @ 07:13)  Lactate, Blood: 1.6 mmol/L (22 @ 16:21)  Lactate, Blood: 4.5 mmol/L (22 @ 12:30)    Auto Eosinophil #: 0.00 K/uL (06-15-22 @ 06:46)  Auto Eosinophil #: 0.01 K/uL (22 @ 21:12)  Auto Eosinophil #: 0.00 K/uL (22 @ 12:30)  Auto Eosinophil #: 0.02 K/uL (22 @ 18:48)        Procalcitonin, Serum: <0.05 (22 @ 21:12)                  INR: 1.25 ratio (22 @ 12:30)  Activated Partial Thromboplastin Time: 26.3 sec (22 @ 12:30)  INR: 1.14 ratio (22 @ 18:48)  Activated Partial Thromboplastin Time: 30.8 sec (22 @ 18:48)          MICROBIOLOGY:    Culture - Blood (22 @ 17:51)    Gram Stain:   Growth in aerobic bottle: Gram Negative Rods  Growth in anaerobic bottle: Gram Negative Rods and Gram positive cocci in  pairs    -  Streptococcus anginosus group: Detec    Specimen Source: .Blood Blood    Organism: Blood Culture PCR    Culture Results:   Growth in aerobic bottle: Gram Negative Rods  Growth in anaerobic bottle: Gram Negative Rods and Gram positive cocci in  pairs  ***Blood Panel PCR results on this specimen are available  approximately 3 hours after the Gram stain result.***  Gram stain, PCR, and/or culture results may not always  correspond due to difference in methodologies.  ************************************************************  This PCR assay was performed by multiplex PCR. This  Assay tests for 66 bacterial and resistance gene targets.  Please refer to the [facility] test directory  at [email or web address] for details.    Organism Identification: Blood Culture PCR    Method Type: PCR        RADIOLOGY & ADDITIONAL STUDIES:

## 2022-06-15 NOTE — PROGRESS NOTE ADULT - SUBJECTIVE AND OBJECTIVE BOX
CHARTING IN PROGRESS     Patient is a 67y old  Male who presents with a chief complaint of abd pain with fever (2022 15:39)    24 hour events: ***    REVIEW OF SYSTEMS  Constitutional: No fever, chills, fatigue  Neuro: No headache, numbness, weakness  Resp: No cough, wheezing, shortness of breath  CVS: No chest pain, palpitations, leg swelling  GI: No abdominal pain, nausea, vomiting, diarrhea   : No dysuria, frequency, incontinence  Skin: No itching, burning, rashes, or lesions   Msk: No joint pain or swelling  Psych: No depression, anxiety, mood swings  Heme: No bleeding    T(F): 98.8 (06-15-22 @ 04:00), Max: 102.3 (22 @ 21:00)  HR: 71 (06-15-22 @ 06:00) (48 - 92)  BP: 130/63 (06-15-22 @ 06:00) (93/55 - 149/68)  RR: 22 (06-15-22 @ 06:00) (14 - 25)  SpO2: 94% (06-15-22 @ 06:00) (92% - 100%)  Wt(kg): --            I&O's Summary     @ 07:01  -  06-15 @ 07:00  --------------------------------------------------------  IN: 3080 mL / OUT: 2610 mL / NET: 470 mL      PHYSICAL EXAM  General:   CNS:   HEENT:   Resp:   CVS:   Abd:   Ext:   Skin:     MEDICATIONS  cefTRIAXone   IVPB IV Intermittent  metroNIDAZOLE  IVPB IV Intermittent          acetaminophen     Tablet .. Oral PRN      enoxaparin Injectable SubCutaneous    pantoprazole  Injectable IV Push      lactated ringers. IV Continuous  potassium phosphate IVPB IV Intermittent    influenza  Vaccine (HIGH DOSE) IntraMuscular    chlorhexidine 4% Liquid Topical                            11.9   17.32 )-----------( 138      ( 2022 07:13 )             34.9       06-15    136  |  105  |  9   ----------------------------<  79  3.5   |  24  |  0.71    Ca    8.1<L>      15 Garett 2022 06:46  Phos  1.9     06-15  Mg     2.0     06-15    TPro  5.5<L>  /  Alb  2.4<L>  /  TBili  0.6  /  DBili  x   /  AST  29  /  ALT  30  /  AlkPhos  92  06-15          PT/INR - ( 2022 12:30 )   PT: 14.7 sec;   INR: 1.25 ratio         PTT - ( 2022 12:30 )  PTT:26.3 sec  Urinalysis Basic - ( 2022 14:29 )    Color: Yellow / Appearance: Clear / S.010 / pH: x  Gluc: x / Ketone: Negative  / Bili: Negative / Urobili: Negative   Blood: x / Protein: 30 mg/dL / Nitrite: Negative   Leuk Esterase: Negative / RBC: >50 /HPF / WBC 0-2   Sq Epi: x / Non Sq Epi: Occasional / Bacteria: Occasional      .Blood Blood-Peripheral   Growth in aerobic bottle: Gram Variable Rods  ***Blood Panel PCR results on this specimen are available  approximately 3 hours after the Gram stain result.***  Gram stain, PCR, and/or culture results may not always  correspond due to difference in methodologies.  ************************************************************  This PCR assay was performed by multiplex PCR. This  Assay tests for 66 bacterial and resistance gene targets.  Please refer to the [facility] test directory  at [email or web address] for details.   Growth in aerobic bottle: Gram Variable Rods  @ 17:51  Clean Catch Clean Catch (Midstream)   <10,000 CFU/mL Normal Urogenital Renetta --  @ 17:50        Radiology: ***  Bedside lung ultrasound: ***  Bedside ECHO: ***    CENTRAL LINE: Y/N          DATE INSERTED:              REMOVE: Y/N  SILVESTRE: Y/N                        DATE INSERTED:              REMOVE: Y/N  A-LINE: Y/N                       DATE INSERTED:              REMOVE: Y/N    GLOBAL ISSUE/BEST PRACTICE  Analgesia:   Sedation:   CAM-ICU:   HOB elevation: yes  Stress ulcer prophylaxis:   VTE prophylaxis:   Glycemic control:   Nutrition:     CODE STATUS: ***  GOC discussion: Y       CHARTING IN PROGRESS     Patient is a 67y old  Male who presents with a chief complaint of abd pain with fever (2022 15:39)    24 hour events: No acute events overnight. Patient seen and examined at bedside. Admits to fever/chills. Denies abdominal pain, nausea, vomiting.     REVIEW OF SYSTEMS  Constitutional: Admits fever, chills  GI: No abdominal pain, nausea, vomiting    T(F): 98.8 (06-15-22 @ 04:00), Max: 102.3 (22 @ 21:00)  HR: 71 (06-15-22 @ 06:00) (48 - 92)  BP: 130/63 (06-15-22 @ 06:00) (93/55 - 149/68)  RR: 22 (06-15-22 @ 06:00) (14 - 25)  SpO2: 94% (06-15-22 @ 06:00) (92% - 100%)  Wt(kg): --            I&O's Summary     @ 07:01  -  06-15 @ 07:00  --------------------------------------------------------  IN: 3080 mL / OUT: 2610 mL / NET: 470 mL      PHYSICAL EXAM  General: NAD, seated in chair  CNS:   HEENT:   Resp:   CVS:   Abd:   Ext:   Skin:     MEDICATIONS  cefTRIAXone   IVPB IV Intermittent  metroNIDAZOLE  IVPB IV Intermittent          acetaminophen     Tablet .. Oral PRN      enoxaparin Injectable SubCutaneous    pantoprazole  Injectable IV Push      lactated ringers. IV Continuous  potassium phosphate IVPB IV Intermittent    influenza  Vaccine (HIGH DOSE) IntraMuscular    chlorhexidine 4% Liquid Topical                            11.9   17.32 )-----------( 138      ( 2022 07:13 )             34.9       06-15    136  |  105  |  9   ----------------------------<  79  3.5   |  24  |  0.71    Ca    8.1<L>      15 Garett 2022 06:46  Phos  1.9     06-15  Mg     2.0     -15    TPro  5.5<L>  /  Alb  2.4<L>  /  TBili  0.6  /  DBili  x   /  AST  29  /  ALT  30  /  AlkPhos  92  06-15          PT/INR - ( 2022 12:30 )   PT: 14.7 sec;   INR: 1.25 ratio         PTT - ( 2022 12:30 )  PTT:26.3 sec  Urinalysis Basic - ( 2022 14:29 )    Color: Yellow / Appearance: Clear / S.010 / pH: x  Gluc: x / Ketone: Negative  / Bili: Negative / Urobili: Negative   Blood: x / Protein: 30 mg/dL / Nitrite: Negative   Leuk Esterase: Negative / RBC: >50 /HPF / WBC 0-2   Sq Epi: x / Non Sq Epi: Occasional / Bacteria: Occasional      .Blood Blood-Peripheral   Growth in aerobic bottle: Gram Variable Rods  ***Blood Panel PCR results on this specimen are available  approximately 3 hours after the Gram stain result.***  Gram stain, PCR, and/or culture results may not always  correspond due to difference in methodologies.  ************************************************************  This PCR assay was performed by multiplex PCR. This  Assay tests for 66 bacterial and resistance gene targets.  Please refer to the [facility] Labs test directory  at [email or web address] for details.   Growth in aerobic bottle: Gram Variable Rods  @ 17:51  Clean Catch Clean Catch (Midstream)   <10,000 CFU/mL Normal Urogenital Renetta --  @ 17:50        Radiology: ***  Bedside lung ultrasound: ***  Bedside ECHO: ***    CENTRAL LINE: Y/N          DATE INSERTED:              REMOVE: Y/N  SILVESTRE: Y/N                        DATE INSERTED:              REMOVE: Y/N  A-LINE: Y/N                       DATE INSERTED:              REMOVE: Y/N    GLOBAL ISSUE/BEST PRACTICE  Analgesia:   Sedation:   CAM-ICU:   HOB elevation: yes  Stress ulcer prophylaxis:   VTE prophylaxis:   Glycemic control:   Nutrition:     CODE STATUS: ***  [facility] discussion: Y         Patient is a 67y old  Male who presents with a chief complaint of abd pain with fever (2022 15:39)    24 hour events: No acute events overnight. Patient seen and examined at bedside. Admits to fever/chills. Denies abdominal pain, nausea, vomiting. Patient scheduled for ERCP this AM.     REVIEW OF SYSTEMS  Constitutional: Admits fever, chills  GI: No abdominal pain, nausea, vomiting    T(F): 98.8 (06-15-22 @ 04:00), Max: 102.3 (22 @ 21:00)  HR: 71 (06-15-22 @ 06:00) (48 - 92)  BP: 130/63 (06-15-22 @ 06:00) (93/55 - 149/68)  RR: 22 (06-15-22 @ 06:00) (14 - 25)  SpO2: 94% (06-15-22 @ 06:00) (92% - 100%)  Wt(kg): --            I&O's Summary     @ 07:01  -  06-15 @ 07:00  --------------------------------------------------------  IN: 3080 mL / OUT: 2610 mL / NET: 470 mL      PHYSICAL EXAM  General: NAD, seated in chair  CNS: AAOx3, appropriately responsive to questions and commands  HEENT: NCAT  Resp: CTA b/l  CVS: RRR, S1, S2  Abd: soft, nondistended, no guarding  Ext: no cyanosis or edema    MEDICATIONS  cefTRIAXone   IVPB IV Intermittent  metroNIDAZOLE  IVPB IV Intermittent          acetaminophen     Tablet .. Oral PRN      enoxaparin Injectable SubCutaneous    pantoprazole  Injectable IV Push      lactated ringers. IV Continuous  potassium phosphate IVPB IV Intermittent    influenza  Vaccine (HIGH DOSE) IntraMuscular    chlorhexidine 4% Liquid Topical                            11.9   17.32 )-----------( 138      ( 2022 07:13 )             34.9       06-15    136  |  105  |  9   ----------------------------<  79  3.5   |  24  |  0.71    Ca    8.1<L>      15 Garett 2022 06:46  Phos  1.9     06-15  Mg     2.0     06-15    TPro  5.5<L>  /  Alb  2.4<L>  /  TBili  0.6  /  DBili  x   /  AST  29  /  ALT  30  /  AlkPhos  92  06-15          PT/INR - ( 2022 12:30 )   PT: 14.7 sec;   INR: 1.25 ratio         PTT - ( 2022 12:30 )  PTT:26.3 sec  Urinalysis Basic - ( 2022 14:29 )    Color: Yellow / Appearance: Clear / S.010 / pH: x  Gluc: x / Ketone: Negative  / Bili: Negative / Urobili: Negative   Blood: x / Protein: 30 mg/dL / Nitrite: Negative   Leuk Esterase: Negative / RBC: >50 /HPF / WBC 0-2   Sq Epi: x / Non Sq Epi: Occasional / Bacteria: Occasional      .Blood Blood-Peripheral   Growth in aerobic bottle: Gram Variable Rods  ***Blood Panel PCR results on this specimen are available  approximately 3 hours after the Gram stain result.***  Gram stain, PCR, and/or culture results may not always  correspond due to difference in methodologies.  ************************************************************  This PCR assay was performed by multiplex PCR. This  Assay tests for 66 bacterial and resistance gene targets.  Please refer to the [facility] test directory  at [email or web address] for details.   Growth in aerobic bottle: Gram Variable Rods  @ 17:51  Clean Catch Clean Catch (Midstream)   <10,000 CFU/mL Normal Urogenital Renetta --  @ 17:50        CENTRAL LINE: N     SILVESTRE: N                        A-LINE: N     GLOBAL ISSUE/BEST PRACTICE  Analgesia: N  Sedation: N  HOB elevation: yes  Stress ulcer prophylaxis: Y  VTE prophylaxis: Y  Glycemic control: N  Nutrition:

## 2022-06-16 LAB
-  AMIKACIN: SIGNIFICANT CHANGE UP
-  AMPICILLIN/SULBACTAM: SIGNIFICANT CHANGE UP
-  AMPICILLIN: SIGNIFICANT CHANGE UP
-  AZTREONAM: SIGNIFICANT CHANGE UP
-  CEFAZOLIN: SIGNIFICANT CHANGE UP
-  CEFEPIME: SIGNIFICANT CHANGE UP
-  CEFOXITIN: SIGNIFICANT CHANGE UP
-  CEFTRIAXONE: SIGNIFICANT CHANGE UP
-  CIPROFLOXACIN: SIGNIFICANT CHANGE UP
-  ERTAPENEM: SIGNIFICANT CHANGE UP
-  GENTAMICIN: SIGNIFICANT CHANGE UP
-  IMIPENEM: SIGNIFICANT CHANGE UP
-  LEVOFLOXACIN: SIGNIFICANT CHANGE UP
-  MEROPENEM: SIGNIFICANT CHANGE UP
-  PIPERACILLIN/TAZOBACTAM: SIGNIFICANT CHANGE UP
-  TOBRAMYCIN: SIGNIFICANT CHANGE UP
-  TRIMETHOPRIM/SULFAMETHOXAZOLE: SIGNIFICANT CHANGE UP
ALBUMIN SERPL ELPH-MCNC: 2.5 G/DL — LOW (ref 3.3–5)
ALP SERPL-CCNC: 92 U/L — SIGNIFICANT CHANGE UP (ref 40–120)
ALT FLD-CCNC: 25 U/L — SIGNIFICANT CHANGE UP (ref 12–78)
ANION GAP SERPL CALC-SCNC: 6 MMOL/L — SIGNIFICANT CHANGE UP (ref 5–17)
APTT BLD: 29.5 SEC — SIGNIFICANT CHANGE UP (ref 27.5–35.5)
AST SERPL-CCNC: 19 U/L — SIGNIFICANT CHANGE UP (ref 15–37)
BASOPHILS # BLD AUTO: 0.01 K/UL — SIGNIFICANT CHANGE UP (ref 0–0.2)
BASOPHILS NFR BLD AUTO: 0.1 % — SIGNIFICANT CHANGE UP (ref 0–2)
BILIRUB SERPL-MCNC: 0.4 MG/DL — SIGNIFICANT CHANGE UP (ref 0.2–1.2)
BUN SERPL-MCNC: 10 MG/DL — SIGNIFICANT CHANGE UP (ref 7–23)
CALCIUM SERPL-MCNC: 8.5 MG/DL — SIGNIFICANT CHANGE UP (ref 8.5–10.1)
CHLORIDE SERPL-SCNC: 103 MMOL/L — SIGNIFICANT CHANGE UP (ref 96–108)
CK SERPL-CCNC: 60 U/L — SIGNIFICANT CHANGE UP (ref 26–308)
CO2 SERPL-SCNC: 28 MMOL/L — SIGNIFICANT CHANGE UP (ref 22–31)
CREAT SERPL-MCNC: 0.83 MG/DL — SIGNIFICANT CHANGE UP (ref 0.5–1.3)
CULTURE RESULTS: SIGNIFICANT CHANGE UP
EGFR: 97 ML/MIN/1.73M2 — SIGNIFICANT CHANGE UP
EOSINOPHIL # BLD AUTO: 0 K/UL — SIGNIFICANT CHANGE UP (ref 0–0.5)
EOSINOPHIL NFR BLD AUTO: 0 % — SIGNIFICANT CHANGE UP (ref 0–6)
GLUCOSE SERPL-MCNC: 143 MG/DL — HIGH (ref 70–99)
HCT VFR BLD CALC: 35.5 % — LOW (ref 39–50)
HGB BLD-MCNC: 13 G/DL — SIGNIFICANT CHANGE UP (ref 13–17)
IMM GRANULOCYTES NFR BLD AUTO: 0.8 % — SIGNIFICANT CHANGE UP (ref 0–1.5)
INR BLD: 1.45 RATIO — HIGH (ref 0.88–1.16)
LYMPHOCYTES # BLD AUTO: 0.63 K/UL — LOW (ref 1–3.3)
LYMPHOCYTES # BLD AUTO: 4.1 % — LOW (ref 13–44)
MAGNESIUM SERPL-MCNC: 2.1 MG/DL — SIGNIFICANT CHANGE UP (ref 1.6–2.6)
MCHC RBC-ENTMCNC: 32.9 PG — SIGNIFICANT CHANGE UP (ref 27–34)
MCHC RBC-ENTMCNC: 36.6 GM/DL — HIGH (ref 32–36)
MCV RBC AUTO: 89.9 FL — SIGNIFICANT CHANGE UP (ref 80–100)
METHOD TYPE: SIGNIFICANT CHANGE UP
MONOCYTES # BLD AUTO: 0.8 K/UL — SIGNIFICANT CHANGE UP (ref 0–0.9)
MONOCYTES NFR BLD AUTO: 5.2 % — SIGNIFICANT CHANGE UP (ref 2–14)
NEUTROPHILS # BLD AUTO: 13.75 K/UL — HIGH (ref 1.8–7.4)
NEUTROPHILS NFR BLD AUTO: 89.8 % — HIGH (ref 43–77)
NRBC # BLD: 0 /100 WBCS — SIGNIFICANT CHANGE UP (ref 0–0)
ORGANISM # SPEC MICROSCOPIC CNT: SIGNIFICANT CHANGE UP
ORGANISM # SPEC MICROSCOPIC CNT: SIGNIFICANT CHANGE UP
PHOSPHATE SERPL-MCNC: 2.2 MG/DL — LOW (ref 2.5–4.5)
PLATELET # BLD AUTO: 146 K/UL — LOW (ref 150–400)
POTASSIUM SERPL-MCNC: 3.5 MMOL/L — SIGNIFICANT CHANGE UP (ref 3.5–5.3)
POTASSIUM SERPL-SCNC: 3.5 MMOL/L — SIGNIFICANT CHANGE UP (ref 3.5–5.3)
PROT SERPL-MCNC: 6.1 G/DL — SIGNIFICANT CHANGE UP (ref 6–8.3)
PROTHROM AB SERPL-ACNC: 17 SEC — HIGH (ref 10.5–13.4)
RBC # BLD: 3.95 M/UL — LOW (ref 4.2–5.8)
RBC # FLD: 13.6 % — SIGNIFICANT CHANGE UP (ref 10.3–14.5)
SODIUM SERPL-SCNC: 137 MMOL/L — SIGNIFICANT CHANGE UP (ref 135–145)
SPECIMEN SOURCE: SIGNIFICANT CHANGE UP
TROPONIN I, HIGH SENSITIVITY RESULT: 5.6 NG/L — SIGNIFICANT CHANGE UP
WBC # BLD: 15.32 K/UL — HIGH (ref 3.8–10.5)
WBC # FLD AUTO: 15.32 K/UL — HIGH (ref 3.8–10.5)

## 2022-06-16 PROCEDURE — 93010 ELECTROCARDIOGRAM REPORT: CPT

## 2022-06-16 PROCEDURE — 99223 1ST HOSP IP/OBS HIGH 75: CPT

## 2022-06-16 RX ORDER — PHYTONADIONE (VIT K1) 5 MG
10 TABLET ORAL ONCE
Refills: 0 | Status: COMPLETED | OUTPATIENT
Start: 2022-06-16 | End: 2022-06-16

## 2022-06-16 RX ADMIN — Medication 102 MILLIGRAM(S): at 11:22

## 2022-06-16 RX ADMIN — SODIUM CHLORIDE 75 MILLILITER(S): 9 INJECTION, SOLUTION INTRAVENOUS at 21:32

## 2022-06-16 RX ADMIN — CEFTRIAXONE 100 MILLIGRAM(S): 500 INJECTION, POWDER, FOR SOLUTION INTRAMUSCULAR; INTRAVENOUS at 13:58

## 2022-06-16 RX ADMIN — SODIUM CHLORIDE 75 MILLILITER(S): 9 INJECTION, SOLUTION INTRAVENOUS at 06:37

## 2022-06-16 RX ADMIN — Medication 100 MILLIGRAM(S): at 17:28

## 2022-06-16 RX ADMIN — PANTOPRAZOLE SODIUM 40 MILLIGRAM(S): 20 TABLET, DELAYED RELEASE ORAL at 11:21

## 2022-06-16 RX ADMIN — Medication 100 MILLIGRAM(S): at 21:32

## 2022-06-16 RX ADMIN — Medication 100 MILLIGRAM(S): at 05:00

## 2022-06-16 NOTE — PROGRESS NOTE ADULT - SUBJECTIVE AND OBJECTIVE BOX
68 yo male with cholangitis s/p ERCP/stent, no abd pain, afebrile, normotensive, found to have bradycardia in the preop holding area 38-40's.   Cardiologist saw patient and believes his baseline Hr is probably slower than 60. Patient doesn't want to have an open cholecystectomy. I explained him that there's a chance surgery might need to be opened if HR slows due to CO2 insuflation. patient doesn't want to commit with the possibility of open surgery and wants to wait another week.  Will cancel surgery now and readdress  OK to feed him  Cont IV Abx

## 2022-06-16 NOTE — PROGRESS NOTE ADULT - ASSESSMENT
cholangitis    Advance diet as tolerated  cont iv antibiotics  f/u repeat cultures  likely dc home with outpatient follow up with surgery  will need ercp after gb surgery at [facility] for sphincteroplasty and stone removals  d/w patient  d/w sister over phone    I reviewed the overnight course of events on the unit, re-confirming the patient history. I discussed the care with the patient and their family  The plan of care was discussed with the physician assistant and modifications were made to the notation where appropriate.   Differential diagnosis and plan of care discussed with patient after the evaluation  35 minutes spent on total encounter of which more than fifty percent of the encounter was spent counseling and/or coordinating care by the attending physician.  Advanced care planning was discussed with patient and family.  Advanced care planning forms were reviewed and discussed.  Risks, benefits and alternatives of gastroenterologic procedures were discussed in detail and all questions were answered.

## 2022-06-16 NOTE — PROGRESS NOTE ADULT - SUBJECTIVE AND OBJECTIVE BOX
Patient is a 67y old  Male who presents with a chief complaint of abd pain with fever (16 Jun 2022 10:09)      INTERVAL HPI/OVERNIGHT EVENTS: stable, feels well, noted noted, sx cancelled today due to bradycardia    MEDICATIONS  (STANDING):  cefTRIAXone   IVPB 2000 milliGRAM(s) IV Intermittent every 24 hours  influenza  Vaccine (HIGH DOSE) 0.7 milliLiter(s) IntraMuscular once  lactated ringers. 1000 milliLiter(s) (75 mL/Hr) IV Continuous <Continuous>  metroNIDAZOLE  IVPB 500 milliGRAM(s) IV Intermittent every 8 hours  pantoprazole  Injectable 40 milliGRAM(s) IV Push daily    MEDICATIONS  (PRN):  acetaminophen     Tablet .. 650 milliGRAM(s) Oral every 6 hours PRN Temp greater or equal to 38C (100.4F), Moderate Pain (4 - 6)      Allergies    No Known Allergies    Intolerances        REVIEW OF SYSTEMS:  CONSTITUTIONAL: No fever, weight loss, or fatigue  EYES: No eye pain, visual disturbances  ENMT:  No difficulty hearing, tinnitus, vertigo; No sinus or throat pain  NECK: No pain or stiffness  RESPIRATORY: No cough, wheezing, chills or hemoptysis; No shortness of breath  CARDIOVASCULAR: No chest pain, palpitations, dizziness  GASTROINTESTINAL: No abdominal or epigastric pain. No nausea, vomiting, or hematemesis; No diarrhea or constipation. No melena or hematochezia.  GENITOURINARY: No dysuria, frequency, hematuria, or incontinence  NEUROLOGICAL: No headaches, memory loss, loss of strength, numbness, or tremors  SKIN: No itching, burning  LYMPH NODES: No enlarged glands  MUSCULOSKELETAL: No joint pain or swelling; No muscle, back, or extremity pain  PSYCHIATRIC: No depression, mood swings  HEME/LYMPH: No easy bruising, or bleeding gums  ALLERGY AND IMMUNOLOGIC: No hives    Vital Signs Last 24 Hrs  T(C): 36.6 (16 Jun 2022 07:33), Max: 37.3 (15 Garett 2022 16:00)  T(F): 97.9 (16 Jun 2022 07:33), Max: 99.2 (15 Garett 2022 16:00)  HR: 54 (16 Jun 2022 10:08) (45 - 67)  BP: 144/81 (16 Jun 2022 10:08) (125/73 - 157/85)  BP(mean): 97 (16 Jun 2022 05:00) (93 - 117)  RR: 16 (16 Jun 2022 10:08) (16 - 24)  SpO2: 95% (16 Jun 2022 10:08) (94% - 99%)    PHYSICAL EXAM:  GENERAL: NAD, well-groomed, well-developed  HEAD:  Atraumatic, Normocephalic  EYES: EOMI, PERRLA, conjunctiva and sclera clear  ENMT: No tonsillar erythema, exudates, or enlargement   NECK: Supple, No JVD  NERVOUS SYSTEM:  Alert & Oriented X3, Good concentration  CHEST/LUNG: Clear to auscultation bilaterally; No rales, rhonchi, wheezing  HEART: Regular rate and rhythm  ABDOMEN: Soft, Nontender, Nondistended; Bowel sounds present  EXTREMITIES:  2+ Peripheral Pulses   LYMPH: No lymphadenopathy noted  SKIN: No rashes     LABS:                        13.0   15.32 )-----------( 146      ( 16 Jun 2022 06:15 )             35.5     16 Jun 2022 06:15    137    |  103    |  10     ----------------------------<  143    3.5     |  28     |  0.83     Ca    8.5        16 Jun 2022 06:15  Phos  2.2       16 Jun 2022 06:15  Mg     2.1       16 Jun 2022 06:15    TPro  6.1    /  Alb  2.5    /  TBili  0.4    /  DBili  x      /  AST  19     /  ALT  25     /  AlkPhos  92     16 Jun 2022 06:15    PT/INR - ( 16 Jun 2022 06:15 )   PT: 17.0 sec;   INR: 1.45 ratio         PTT - ( 16 Jun 2022 06:15 )  PTT:29.5 sec    CAPILLARY BLOOD GLUCOSE        blood culture --   Growth in aerobic and anaerobic bottles: Klebsiella oxytoca/Raoutella  ornithinolytica  Growth in anaerobic bottle: Escherichia coli Susceptibility to follow.  ***Blood Panel PCR results on this specimen are available  approximately 3 hours after the Gram stain result.***  Gram stain, PCR, and/or culture results may not always  correspond due to difference in methodologies.  ************************************************************  This PCR assay was performed by multiplex PCR. This  Assay tests for 66 bacterial and resistance gene targets.  Please refer to the [facility] test directory  at [email or web address] for details.   06-13 @ 17:51    blood culture --   <10,000 CFU/mL Normal Urogenital Renetta   06-13 @ 17:50      urine culture --  06-13 @ 17:51  results   Growth in aerobic and anaerobic bottles: Klebsiella oxytoca/Raoutella  ornithinolytica  Growth in anaerobic bottle: Escherichia coli Susceptibility to follow.  ***Blood Panel PCR results on this specimen are available  approximately 3 hours after the Gram stain result.***  Gram stain, PCR, and/or culture results may not always  correspond due to difference in methodologies.  ************************************************************  This PCR assay was performed by multiplex PCR. This  Assay tests for 66 bacterial and resistance gene targets.  Please refer to the [facility] test directory  at [email or web address] for details. 06-13 @ 17:51  urine culture --  06-13 @ 17:50  results   <10,000 CFU/mL Normal Urogenital Renetta 06-13 @ 17:50    wound with gram statin --    06-13 @ 17:51  organism  Blood Culture PCR   06-13 @ 17:51  specimen source .Blood Blood-Peripheral  06-13 @ 17:51  wound with gram statin --    06-13 @ 17:50  organism  --   06-13 @ 17:50  specimen source Clean Catch Clean Catch (Midstream)  06-13 @ 17:50      RADIOLOGY & ADDITIONAL TESTS:      Consultant(s) Notes Reviewed:  [ ] YES  [ ] NO    Care Discussed with Consultants/Other Providers [ ] YES  [ ] NO    Advanced care planning discussed with patient and family, advanced care planning forms reviewed, discussed, and completed.  20 minutes spent.

## 2022-06-16 NOTE — CONSULT NOTE ADULT - ATTENDING COMMENTS
bradycardia in the setting of recent use of midodrine and incr vagal tone in the setting of abd process  midodrine last given 2d ago, and seems less likely to be a culprit  usual hr at home reportedly ~70  intact baseline exercise capacity  surgery cancelled and apparently no urgency to reschedule  want to followup with me in the office and we can consider a holter and other indicated testing preop, pending his clinical course  avoid av pinky agents

## 2022-06-16 NOTE — PROGRESS NOTE ADULT - SUBJECTIVE AND OBJECTIVE BOX
[facility]  Denilson Painter PA-C  [facility] [place] [address]  [phone]      INTERVAL HPI/OVERNIGHT EVENTS:    patient s/e  gb surgery cancelled    MEDICATIONS  (STANDING):  cefTRIAXone   IVPB 2000 milliGRAM(s) IV Intermittent every 24 hours  influenza  Vaccine (HIGH DOSE) 0.7 milliLiter(s) IntraMuscular once  lactated ringers. 1000 milliLiter(s) (75 mL/Hr) IV Continuous <Continuous>  metroNIDAZOLE  IVPB 500 milliGRAM(s) IV Intermittent every 8 hours  pantoprazole  Injectable 40 milliGRAM(s) IV Push daily    MEDICATIONS  (PRN):  acetaminophen     Tablet .. 650 milliGRAM(s) Oral every 6 hours PRN Temp greater or equal to 38C (100.4F), Moderate Pain (4 - 6)      Allergies    No Known Allergies    Intolerances        ROS:   General:  No wt loss, fevers, chills, night sweats, fatigue,   Eyes:  Good vision, no reported pain  ENT:  No sore throat, pain, runny nose, dysphagia  CV:  No pain, palpitations, hypo/hypertension  Resp:  No dyspnea, cough, tachypnea, wheezing  GI:  No pain, No nausea, No vomiting, No diarrhea, No constipation, No weight loss, No fever, No pruritis, No rectal bleeding, No tarry stools, No dysphagia,  :  No pain, bleeding, incontinence, nocturia  Muscle:  No pain, weakness  Neuro:  No weakness, tingling, memory problems  Psych:  No fatigue, insomnia, mood problems, depression  Endocrine:  No polyuria, polydipsia, cold/heat intolerance  Heme:  No petechiae, ecchymosis, easy bruisability  Skin:  No rash, tattoos, scars, edema      PHYSICAL EXAM:   Vital Signs:  Vital Signs Last 24 Hrs  T(C): 36.7 (16 Jun 2022 12:39), Max: 37.3 (15 Garett 2022 16:00)  T(F): 98.1 (16 Jun 2022 12:39), Max: 99.2 (15 Garett 2022 16:00)  HR: 89 (16 Jun 2022 12:39) (45 - 89)  BP: 135/76 (16 Jun 2022 12:39) (125/73 - 157/85)  BP(mean): 97 (16 Jun 2022 05:00) (93 - 113)  RR: 18 (16 Jun 2022 12:39) (16 - 24)  SpO2: 95% (16 Jun 2022 12:39) (94% - 99%)  Daily Height in cm: 170.18 (16 Jun 2022 07:33)    Daily     GENERAL:  Appears stated age,   HEENT:  NC/AT,    CHEST:  Full & symmetric excursion,   HEART:  Regular rhythm,  ABDOMEN:  Soft, non-tender, non-distended,  EXTEREMITIES:  no cyanosis  SKIN:  No rash  NEURO:  Alert,       LABS:                        13.0   15.32 )-----------( 146      ( 16 Jun 2022 06:15 )             35.5     06-16    137  |  103  |  10  ----------------------------<  143<H>  3.5   |  28  |  0.83    Ca    8.5      16 Jun 2022 06:15  Phos  2.2     06-16  Mg     2.1     06-16    TPro  6.1  /  Alb  2.5<L>  /  TBili  0.4  /  DBili  x   /  AST  19  /  ALT  25  /  AlkPhos  92  06-16    PT/INR - ( 16 Jun 2022 06:15 )   PT: 17.0 sec;   INR: 1.45 ratio         PTT - ( 16 Jun 2022 06:15 )  PTT:29.5 sec      RADIOLOGY & ADDITIONAL TESTS:

## 2022-06-16 NOTE — CONSULT NOTE ADULT - ASSESSMENT
66 yo male with PMHx of HTN now admitted with sepsis 2/2 to pneumobilia- klebsiella oxytoca, scheduled for laparoscopic cholecystectomy today found to have new onset bradycardia.    #Bradycardia, HTN  - Patient now in new onset bradycardia with HR 50  - On admission EKG shows patient in sinus tachycardia- likely 2/2 to pain from acute cholecystitis and infection   - Possible that this range of HR 50 is patient's baseline and was elevated prior 2/2 to infection   - Patient is not complaining of any cardiac symptoms at this time.  - Patient has no ischemic, occlusive or valvular cardiac pathologies  - Repeat EKG shows sinus bradycardia, low voltage qrs, 41bpm  - BP well controlled, monitor routine hemodynamics.  - Not on any bp medications in hospital, takes Telmisartan 20mg daily at home   - Monitor and replete lytes, keep K>4, Mg>2.  - Pt has no active ischemia, decompensated heart failure, unstable arrythmia, or severe stenotic valvular disease, and has no cardiac risk factors. In the setting of low to intermediate risk procedure, he is optimized from cardiovascular standpoint to proceed with planned procedure with routine hemodynamic monitoring.   - Other cardiovascular workup will depend on clinical course.  - All other workup per primary team.  - Will continue to follow.             66 yo male with PMHx of HTN now admitted with sepsis 2/2 to pneumobilia- klebsiella oxytoca, scheduled for laparoscopic cholecystectomy today found to have new onset bradycardia.    #Bradycardia, HTN  - Patient now in new onset bradycardia with HR 50  - On admission EKG shows patient in sinus tachycardia- likely 2/2 to pain from acute cholecystitis and infection   - Per patient, was being evaluated for underlying ischemia every 2 yrs with nuclear stress tests, which were normal   - No acute signs of ischemia at this time   - Repeat EKG shows sinus bradycardia, low voltage qrs, 41bpm  - Sinus bradycardia possible from residual effects of Midodrine given 6/14 and combined with acute abdominal process   - Will hold on surgery for now   - Patient to follow up with Dr. Bal outpatient for further workup   - Will schedule elective lap shahriar once HR improves to baseline   - Continue medical management with antibiotics   - BP well controlled, monitor routine hemodynamics.  - Not on any bp medications in hospital, takes Telmisartan 20mg daily at home   - Patient BP wnl, may consider restarting Telmisartan if blood pressure requires   - Monitor and replete lytes, keep K>4, Mg>2.  - Other cardiovascular workup will depend on clinical course.  - All other workup per primary team.  - Will continue to follow.             68 yo male with PMHx of HTN now admitted with sepsis 2/2 to pneumobilia- klebsiella oxytoca, scheduled for laparoscopic cholecystectomy today found to have new onset bradycardia.    #Bradycardia, HTN  - Patient has been bradycardia with HR 40s-50s over the past 36h  - On admission EKG shows patient in sinus tachycardia- likely 2/2 to pain from acute cholecystitis and infection   - Per patient, was being evaluated for underlying ischemia every 2 yrs with nuclear stress tests, which were normal   - No acute signs of ischemia at this time   - Repeat EKG shows sinus bradycardia, low voltage qrs, 41bpm  - Sinus bradycardia possible from residual effects of Midodrine given 6/14 combined with acute abdominal process with associated elevated vagal tone  - Will hold on surgery for now   - Patient to follow up with Dr. Bal outpatient for further workup   - Will schedule elective lap shahriar once HR improves to baseline   - Continue medical management with antibiotics   - BP well controlled, monitor routine hemodynamics.  - Not on any bp medications in hospital, takes Telmisartan 20mg daily at home   - Patient BP wnl, may consider restarting Telmisartan if blood pressure requires   - Monitor and replete lytes, keep K>4, Mg>2.  - Other cardiovascular workup will depend on clinical course.  - All other workup per primary team.  - Will continue to follow.

## 2022-06-16 NOTE — CONSULT NOTE ADULT - SUBJECTIVE AND OBJECTIVE BOX
CHARTING IN PROGRESS     [facility]         Brigette Ac, Valeriano Kimble Pannella, Patel, Savella        [phone] (office)    Reason for Consult:    Interval HPI: Patient seen and examined at bedside. No acute events overnight.     HPI:   68yo M  pw abdominal pain, chills and vomiting, was seen in ED yesterday for the same, He experienced the same pain in September (was in [place] at the time) - where he was told he had gallstones, a dilated common bile duct, and underwent an ERCP (states a stone was removed) Since being back in [place], the pt was seeing Dr. Cruz who told him to follow a low fat diet. Admits to chills, nausea, vomiting (x7 - food contents, no hematemesis), in ED yesterday US and CT noted dilated CBD, and gallstones no cholecystitis and no LFT or bili abnormality, was seen by surgery, no intervention recommended, was told to rusty cruz. pt states he called dr cruz office today bec he continues to vomit and was told to go to ED   (13 Jun 2022 15:25)      PAST MEDICAL & SURGICAL HISTORY:  HTN (hypertension)      No significant past surgical history          SOCIAL HISTORY: No active tobacco, alcohol or illicit drug use    FAMILY HISTORY:  No pertinent family history in first degree relatives        Home Medications:  telmisartan 20 mg oral tablet: 1 tab(s) orally once a day (13 Jun 2022 16:32)      MEDICATIONS  (STANDING):  cefTRIAXone   IVPB 2000 milliGRAM(s) IV Intermittent every 24 hours  influenza  Vaccine (HIGH DOSE) 0.7 milliLiter(s) IntraMuscular once  lactated ringers. 1000 milliLiter(s) (75 mL/Hr) IV Continuous <Continuous>  metroNIDAZOLE  IVPB 500 milliGRAM(s) IV Intermittent every 8 hours  pantoprazole  Injectable 40 milliGRAM(s) IV Push daily    MEDICATIONS  (PRN):  acetaminophen     Tablet .. 650 milliGRAM(s) Oral every 6 hours PRN Temp greater or equal to 38C (100.4F), Moderate Pain (4 - 6)      Allergies    No Known Allergies    Intolerances        REVIEW OF SYSTEMS: Negative except as per HPI.    VITAL SIGNS:   Vital Signs Last 24 Hrs  T(C): 36.6 (16 Jun 2022 07:33), Max: 37.8 (15 Garett 2022 09:02)  T(F): 97.9 (16 Jun 2022 07:33), Max: 100 (15 Garett 2022 09:02)  HR: 45 (16 Jun 2022 07:56) (45 - 72)  BP: 147/82 (16 Jun 2022 07:56) (125/73 - 157/85)  BP(mean): 97 (16 Jun 2022 05:00) (93 - 117)  RR: 16 (16 Jun 2022 07:56) (16 - 24)  SpO2: 97% (16 Jun 2022 07:56) (94% - 100%)    I&O's Summary    15 Garett 2022 07:01  -  16 Jun 2022 07:00  --------------------------------------------------------  IN: 2755 mL / OUT: 2050 mL / NET: 705 mL        PHYSICAL EXAM:  Constitutional: NAD, well-developed  HEENT NC/AT, moist mucous membranes  Pulmonary: Non-labored, breath sounds are clear bilaterally, no wheezing, rales or rhonchi  Cardiovascular: +S1, S2, bradycardia, no murmur  Gastrointestinal: Soft, nontender, nondistended, normoactive bowel sounds  Extremities: No peripheral edema   Neurological: Alert, strength and sensitivity are grossly intact  Skin: No obvious lesions/rashes  Psych: Mood & affect appropriate    LABS: All Labs Reviewed:                        13.0   15.32 )-----------( 146      ( 16 Jun 2022 06:15 )             35.5                         12.2   15.07 )-----------( 106      ( 15 Garett 2022 06:46 )             37.2                         11.9   17.32 )-----------( 138      ( 14 Jun 2022 07:13 )             34.9     16 Jun 2022 06:15    137    |  103    |  10     ----------------------------<  143    3.5     |  28     |  0.83   15 Garett 2022 06:46    136    |  105    |  9      ----------------------------<  79     3.5     |  24     |  0.71   14 Jun 2022 07:13    135    |  106    |  14     ----------------------------<  95     3.5     |  22     |  0.84     Ca    8.5        16 Jun 2022 06:15  Ca    8.1        15 Jun 2022 06:46  Ca    7.5        14 Jun 2022 07:13  Phos  2.2       16 Jun 2022 06:15  Phos  1.9       15 Jun 2022 06:46  Phos  2.1       14 Jun 2022 07:13  Mg     2.1       16 Jun 2022 06:15  Mg     2.0       15 Garett 2022 06:46  Mg     1.9       14 Jun 2022 07:13    TPro  6.1    /  Alb  2.5    /  TBili  0.4    /  DBili  x      /  AST  19     /  ALT  25     /  AlkPhos  92     16 Jun 2022 06:15  TPro  5.5    /  Alb  2.4    /  TBili  0.6    /  DBili  x      /  AST  29     /  ALT  30     /  AlkPhos  92     15 Garett 2022 06:46  TPro  5.3    /  Alb  2.5    /  TBili  0.7    /  DBili  x      /  AST  25     /  ALT  29     /  AlkPhos  65     14 Jun 2022 07:13    PT/INR - ( 16 Jun 2022 06:15 )   PT: 17.0 sec;   INR: 1.45 ratio         PTT - ( 16 Jun 2022 06:15 )  PTT:29.5 sec      Blood Culture: Organism Blood Culture PCR  Gram Stain Blood -- Gram Stain   Growth in aerobic bottle: Gram Variable Rods  Growth in anaerobic bottle: Gram Negative Rods  Specimen Source .Blood Blood-Peripheral  Culture-Blood --    Organism --  Gram Stain Blood -- Gram Stain --  Specimen Source Clean Catch Clean Catch (Midstream)  Culture-Blood --     [facility]         Brigette Ac, Shyanne, Valeriano, Ignacio, Efraín, Casey        [phone] (office)    Reason for Consult:    Interval HPI: Patient seen and examined at bedside. No acute events overnight.     HPI:   68yo M  pw abdominal pain, chills and vomiting, was seen in ED yesterday for the same, He experienced the same pain in September (was in [place] at the time) - where he was told he had gallstones, a dilated common bile duct, and underwent an ERCP (states a stone was removed) Since being back in [place], the pt was seeing Dr. Cruz who told him to follow a low fat diet. Admits to chills, nausea, vomiting (x7 - food contents, no hematemesis), in ED yesterday US and CT noted dilated CBD, and gallstones no cholecystitis and no LFT or bili abnormality, was seen by surgery, no intervention recommended, was told to rusty w dr cruz. pt states he called dr cruz office today bec he continues to vomit and was told to go to ED   (13 Jun 2022 15:25)      PAST MEDICAL & SURGICAL HISTORY:  HTN (hypertension)      No significant past surgical history          SOCIAL HISTORY: No active tobacco, alcohol or illicit drug use    FAMILY HISTORY:  No pertinent family history in first degree relatives        Home Medications:  telmisartan 20 mg oral tablet: 1 tab(s) orally once a day (13 Jun 2022 16:32)      MEDICATIONS  (STANDING):  cefTRIAXone   IVPB 2000 milliGRAM(s) IV Intermittent every 24 hours  influenza  Vaccine (HIGH DOSE) 0.7 milliLiter(s) IntraMuscular once  lactated ringers. 1000 milliLiter(s) (75 mL/Hr) IV Continuous <Continuous>  metroNIDAZOLE  IVPB 500 milliGRAM(s) IV Intermittent every 8 hours  pantoprazole  Injectable 40 milliGRAM(s) IV Push daily    MEDICATIONS  (PRN):  acetaminophen     Tablet .. 650 milliGRAM(s) Oral every 6 hours PRN Temp greater or equal to 38C (100.4F), Moderate Pain (4 - 6)      Allergies    No Known Allergies    Intolerances        REVIEW OF SYSTEMS: Negative except as per HPI.    VITAL SIGNS:   Vital Signs Last 24 Hrs  T(C): 36.6 (16 Jun 2022 07:33), Max: 37.8 (15 Garett 2022 09:02)  T(F): 97.9 (16 Jun 2022 07:33), Max: 100 (15 Garett 2022 09:02)  HR: 45 (16 Jun 2022 07:56) (45 - 72)  BP: 147/82 (16 Jun 2022 07:56) (125/73 - 157/85)  BP(mean): 97 (16 Jun 2022 05:00) (93 - 117)  RR: 16 (16 Jun 2022 07:56) (16 - 24)  SpO2: 97% (16 Jun 2022 07:56) (94% - 100%)    I&O's Summary    15 Garett 2022 07:01  -  16 Jun 2022 07:00  --------------------------------------------------------  IN: 2755 mL / OUT: 2050 mL / NET: 705 mL        PHYSICAL EXAM:  Constitutional: NAD, well-developed  HEENT NC/AT, moist mucous membranes  Pulmonary: Non-labored, breath sounds are clear bilaterally, no wheezing, rales or rhonchi  Cardiovascular: +S1, S2, bradycardia, no murmur  Gastrointestinal: Soft, nontender, nondistended, normoactive bowel sounds  Extremities: No peripheral edema   Neurological: Alert, strength and sensitivity are grossly intact  Skin: No obvious lesions/rashes  Psych: Mood & affect appropriate    LABS: All Labs Reviewed:                        13.0   15.32 )-----------( 146      ( 16 Jun 2022 06:15 )             35.5                         12.2   15.07 )-----------( 106      ( 15 Garett 2022 06:46 )             37.2                         11.9   17.32 )-----------( 138      ( 14 Jun 2022 07:13 )             34.9     16 Jun 2022 06:15    137    |  103    |  10     ----------------------------<  143    3.5     |  28     |  0.83   15 Garett 2022 06:46    136    |  105    |  9      ----------------------------<  79     3.5     |  24     |  0.71   14 Jun 2022 07:13    135    |  106    |  14     ----------------------------<  95     3.5     |  22     |  0.84     Ca    8.5        16 Jun 2022 06:15  Ca    8.1        15 Jun 2022 06:46  Ca    7.5        14 Jun 2022 07:13  Phos  2.2       16 Jun 2022 06:15  Phos  1.9       15 Jun 2022 06:46  Phos  2.1       14 Jun 2022 07:13  Mg     2.1       16 Jun 2022 06:15  Mg     2.0       15 Garett 2022 06:46  Mg     1.9       14 Jun 2022 07:13    TPro  6.1    /  Alb  2.5    /  TBili  0.4    /  DBili  x      /  AST  19     /  ALT  25     /  AlkPhos  92     16 Jun 2022 06:15  TPro  5.5    /  Alb  2.4    /  TBili  0.6    /  DBili  x      /  AST  29     /  ALT  30     /  AlkPhos  92     15 Garett 2022 06:46  TPro  5.3    /  Alb  2.5    /  TBili  0.7    /  DBili  x      /  AST  25     /  ALT  29     /  AlkPhos  65     14 Jun 2022 07:13    PT/INR - ( 16 Jun 2022 06:15 )   PT: 17.0 sec;   INR: 1.45 ratio         PTT - ( 16 Jun 2022 06:15 )  PTT:29.5 sec      Blood Culture: Organism Blood Culture PCR  Gram Stain Blood -- Gram Stain   Growth in aerobic bottle: Gram Variable Rods  Growth in anaerobic bottle: Gram Negative Rods  Specimen Source .Blood Blood-Peripheral  Culture-Blood --    Organism --  Gram Stain Blood -- Gram Stain --  Specimen Source Clean Catch Clean Catch (Midstream)  Culture-Blood --     [facility]         Brigette Ac, Shyanne, Valeriano, Ignacio, Efraín, Casey        [phone] (office)    Reason for Consult:    Interval HPI: Patient seen and examined at bedside. No acute events overnight.     HPI:   66yo M  pw abdominal pain, chills and vomiting, was seen in ED yesterday for the same, He experienced the same pain in September (was in [place] at the time) - where he was told he had gallstones, a dilated common bile duct, and underwent an ERCP (states a stone was removed) Since being back in [place], the pt was seeing Dr. Cruz who told him to follow a low fat diet. Admits to chills, nausea, vomiting (x7 - food contents, no hematemesis), in ED US and CT noted dilated CBD, and gallstones no cholecystitis and no LFT or bili abnormality, was seen by surgery, no intervention recommended, was told to rusty cruz. pt states he called dr cruz office today bec he continues to vomit and was told to go to ED    His hospital course has been notable for sepsis with hypotension requiring pressors in the icu, as well as midodrine, last given about 2d ago.  This am he was being prepared for lap shahriar, but he was noted to be severely bradycardic, in sinus.  Case cancelled and cardiology consultation requested.       PAST MEDICAL & SURGICAL HISTORY:  HTN (hypertension)      No significant past surgical history          SOCIAL HISTORY: No active tobacco, alcohol or illicit drug use    FAMILY HISTORY:  No pertinent family history in first degree relatives        Home Medications:  telmisartan 20 mg oral tablet: 1 tab(s) orally once a day (13 Jun 2022 16:32)      MEDICATIONS  (STANDING):  cefTRIAXone   IVPB 2000 milliGRAM(s) IV Intermittent every 24 hours  influenza  Vaccine (HIGH DOSE) 0.7 milliLiter(s) IntraMuscular once  lactated ringers. 1000 milliLiter(s) (75 mL/Hr) IV Continuous <Continuous>  metroNIDAZOLE  IVPB 500 milliGRAM(s) IV Intermittent every 8 hours  pantoprazole  Injectable 40 milliGRAM(s) IV Push daily    MEDICATIONS  (PRN):  acetaminophen     Tablet .. 650 milliGRAM(s) Oral every 6 hours PRN Temp greater or equal to 38C (100.4F), Moderate Pain (4 - 6)      Allergies    No Known Allergies    Intolerances        REVIEW OF SYSTEMS: Negative except as per HPI.    VITAL SIGNS:   Vital Signs Last 24 Hrs  T(C): 36.6 (16 Jun 2022 07:33), Max: 37.8 (15 Garett 2022 09:02)  T(F): 97.9 (16 Jun 2022 07:33), Max: 100 (15 Garett 2022 09:02)  HR: 45 (16 Jun 2022 07:56) (45 - 72)  BP: 147/82 (16 Jun 2022 07:56) (125/73 - 157/85)  BP(mean): 97 (16 Jun 2022 05:00) (93 - 117)  RR: 16 (16 Jun 2022 07:56) (16 - 24)  SpO2: 97% (16 Jun 2022 07:56) (94% - 100%)    I&O's Summary    15 Garett 2022 07:01  -  16 Jun 2022 07:00  --------------------------------------------------------  IN: 2755 mL / OUT: 2050 mL / NET: 705 mL        PHYSICAL EXAM:  Constitutional: NAD, well-developed  HEENT NC/AT, moist mucous membranes  Pulmonary: Non-labored, breath sounds are clear bilaterally, no wheezing, rales or rhonchi  Cardiovascular: +S1, S2, bradycardia, no murmur  Gastrointestinal: Soft, nontender, nondistended, normoactive bowel sounds  Extremities: No peripheral edema   Neurological: Alert, strength and sensitivity are grossly intact  Skin: No obvious lesions/rashes  Psych: Mood & affect appropriate    LABS: All Labs Reviewed:                        13.0   15.32 )-----------( 146      ( 16 Jun 2022 06:15 )             35.5                         12.2   15.07 )-----------( 106      ( 15 Garett 2022 06:46 )             37.2                         11.9   17.32 )-----------( 138      ( 14 Jun 2022 07:13 )             34.9     16 Jun 2022 06:15    137    |  103    |  10     ----------------------------<  143    3.5     |  28     |  0.83   15 Garett 2022 06:46    136    |  105    |  9      ----------------------------<  79     3.5     |  24     |  0.71   14 Jun 2022 07:13    135    |  106    |  14     ----------------------------<  95     3.5     |  22     |  0.84     Ca    8.5        16 Jun 2022 06:15  Ca    8.1        15 Jun 2022 06:46  Ca    7.5        14 Jun 2022 07:13  Phos  2.2       16 Jun 2022 06:15  Phos  1.9       15 Jun 2022 06:46  Phos  2.1       14 Jun 2022 07:13  Mg     2.1       16 Jun 2022 06:15  Mg     2.0       15 Garett 2022 06:46  Mg     1.9       14 Jun 2022 07:13    TPro  6.1    /  Alb  2.5    /  TBili  0.4    /  DBili  x      /  AST  19     /  ALT  25     /  AlkPhos  92     16 Jun 2022 06:15  TPro  5.5    /  Alb  2.4    /  TBili  0.6    /  DBili  x      /  AST  29     /  ALT  30     /  AlkPhos  92     15 Garett 2022 06:46  TPro  5.3    /  Alb  2.5    /  TBili  0.7    /  DBili  x      /  AST  25     /  ALT  29     /  AlkPhos  65     14 Jun 2022 07:13    PT/INR - ( 16 Jun 2022 06:15 )   PT: 17.0 sec;   INR: 1.45 ratio         PTT - ( 16 Jun 2022 06:15 )  PTT:29.5 sec      Blood Culture: Organism Blood Culture PCR  Gram Stain Blood -- Gram Stain   Growth in aerobic bottle: Gram Variable Rods  Growth in anaerobic bottle: Gram Negative Rods  Specimen Source .Blood Blood-Peripheral  Culture-Blood --    Organism --  Gram Stain Blood -- Gram Stain --  Specimen Source Clean Catch Clean Catch (Midstream)  Culture-Blood --

## 2022-06-17 ENCOUNTER — TRANSCRIPTION ENCOUNTER (OUTPATIENT)
Age: 67
End: 2022-06-17

## 2022-06-17 VITALS
RESPIRATION RATE: 18 BRPM | SYSTOLIC BLOOD PRESSURE: 127 MMHG | OXYGEN SATURATION: 97 % | HEART RATE: 52 BPM | TEMPERATURE: 98 F | DIASTOLIC BLOOD PRESSURE: 71 MMHG

## 2022-06-17 LAB
ALBUMIN SERPL ELPH-MCNC: 2.6 G/DL — LOW (ref 3.3–5)
ALP SERPL-CCNC: 86 U/L — SIGNIFICANT CHANGE UP (ref 40–120)
ALT FLD-CCNC: 25 U/L — SIGNIFICANT CHANGE UP (ref 12–78)
ANION GAP SERPL CALC-SCNC: 8 MMOL/L — SIGNIFICANT CHANGE UP (ref 5–17)
AST SERPL-CCNC: 22 U/L — SIGNIFICANT CHANGE UP (ref 15–37)
BILIRUB SERPL-MCNC: 0.4 MG/DL — SIGNIFICANT CHANGE UP (ref 0.2–1.2)
BUN SERPL-MCNC: 10 MG/DL — SIGNIFICANT CHANGE UP (ref 7–23)
CALCIUM SERPL-MCNC: 8.1 MG/DL — LOW (ref 8.5–10.1)
CHLORIDE SERPL-SCNC: 104 MMOL/L — SIGNIFICANT CHANGE UP (ref 96–108)
CO2 SERPL-SCNC: 27 MMOL/L — SIGNIFICANT CHANGE UP (ref 22–31)
CREAT SERPL-MCNC: 0.72 MG/DL — SIGNIFICANT CHANGE UP (ref 0.5–1.3)
EGFR: 101 ML/MIN/1.73M2 — SIGNIFICANT CHANGE UP
GLUCOSE SERPL-MCNC: 91 MG/DL — SIGNIFICANT CHANGE UP (ref 70–99)
HCT VFR BLD CALC: 39.1 % — SIGNIFICANT CHANGE UP (ref 39–50)
HGB BLD-MCNC: 13.4 G/DL — SIGNIFICANT CHANGE UP (ref 13–17)
MCHC RBC-ENTMCNC: 29.4 PG — SIGNIFICANT CHANGE UP (ref 27–34)
MCHC RBC-ENTMCNC: 34.3 GM/DL — SIGNIFICANT CHANGE UP (ref 32–36)
MCV RBC AUTO: 85.7 FL — SIGNIFICANT CHANGE UP (ref 80–100)
NRBC # BLD: 0 /100 WBCS — SIGNIFICANT CHANGE UP (ref 0–0)
PLATELET # BLD AUTO: 163 K/UL — SIGNIFICANT CHANGE UP (ref 150–400)
POTASSIUM SERPL-MCNC: 3.4 MMOL/L — LOW (ref 3.5–5.3)
POTASSIUM SERPL-SCNC: 3.4 MMOL/L — LOW (ref 3.5–5.3)
PROT SERPL-MCNC: 6.1 G/DL — SIGNIFICANT CHANGE UP (ref 6–8.3)
RBC # BLD: 4.56 M/UL — SIGNIFICANT CHANGE UP (ref 4.2–5.8)
RBC # FLD: 12.1 % — SIGNIFICANT CHANGE UP (ref 10.3–14.5)
SODIUM SERPL-SCNC: 139 MMOL/L — SIGNIFICANT CHANGE UP (ref 135–145)
WBC # BLD: 12.18 K/UL — HIGH (ref 3.8–10.5)
WBC # FLD AUTO: 12.18 K/UL — HIGH (ref 3.8–10.5)

## 2022-06-17 PROCEDURE — 84484 ASSAY OF TROPONIN QUANT: CPT

## 2022-06-17 PROCEDURE — 76705 ECHO EXAM OF ABDOMEN: CPT

## 2022-06-17 PROCEDURE — 84145 PROCALCITONIN (PCT): CPT

## 2022-06-17 PROCEDURE — 99292 CRITICAL CARE ADDL 30 MIN: CPT

## 2022-06-17 PROCEDURE — 86900 BLOOD TYPING SEROLOGIC ABO: CPT

## 2022-06-17 PROCEDURE — 85730 THROMBOPLASTIN TIME PARTIAL: CPT

## 2022-06-17 PROCEDURE — 85025 COMPLETE CBC W/AUTO DIFF WBC: CPT

## 2022-06-17 PROCEDURE — 87086 URINE CULTURE/COLONY COUNT: CPT

## 2022-06-17 PROCEDURE — 96374 THER/PROPH/DIAG INJ IV PUSH: CPT

## 2022-06-17 PROCEDURE — 86901 BLOOD TYPING SEROLOGIC RH(D): CPT

## 2022-06-17 PROCEDURE — C1769: CPT

## 2022-06-17 PROCEDURE — 83605 ASSAY OF LACTIC ACID: CPT

## 2022-06-17 PROCEDURE — 71045 X-RAY EXAM CHEST 1 VIEW: CPT

## 2022-06-17 PROCEDURE — 87186 SC STD MICRODIL/AGAR DIL: CPT

## 2022-06-17 PROCEDURE — 99291 CRITICAL CARE FIRST HOUR: CPT | Mod: 25

## 2022-06-17 PROCEDURE — 84100 ASSAY OF PHOSPHORUS: CPT

## 2022-06-17 PROCEDURE — 87040 BLOOD CULTURE FOR BACTERIA: CPT

## 2022-06-17 PROCEDURE — U0003: CPT

## 2022-06-17 PROCEDURE — 99232 SBSQ HOSP IP/OBS MODERATE 35: CPT

## 2022-06-17 PROCEDURE — 85610 PROTHROMBIN TIME: CPT

## 2022-06-17 PROCEDURE — 82962 GLUCOSE BLOOD TEST: CPT

## 2022-06-17 PROCEDURE — 96375 TX/PRO/DX INJ NEW DRUG ADDON: CPT

## 2022-06-17 PROCEDURE — 86850 RBC ANTIBODY SCREEN: CPT

## 2022-06-17 PROCEDURE — 74177 CT ABD & PELVIS W/CONTRAST: CPT | Mod: MA

## 2022-06-17 PROCEDURE — 80053 COMPREHEN METABOLIC PANEL: CPT

## 2022-06-17 PROCEDURE — 93005 ELECTROCARDIOGRAM TRACING: CPT

## 2022-06-17 PROCEDURE — 85027 COMPLETE CBC AUTOMATED: CPT

## 2022-06-17 PROCEDURE — C2625: CPT

## 2022-06-17 PROCEDURE — 86803 HEPATITIS C AB TEST: CPT

## 2022-06-17 PROCEDURE — 36415 COLL VENOUS BLD VENIPUNCTURE: CPT

## 2022-06-17 PROCEDURE — 87150 DNA/RNA AMPLIFIED PROBE: CPT

## 2022-06-17 PROCEDURE — U0005: CPT

## 2022-06-17 PROCEDURE — 81001 URINALYSIS AUTO W/SCOPE: CPT

## 2022-06-17 PROCEDURE — 83735 ASSAY OF MAGNESIUM: CPT

## 2022-06-17 PROCEDURE — 76000 FLUOROSCOPY <1 HR PHYS/QHP: CPT

## 2022-06-17 PROCEDURE — 82550 ASSAY OF CK (CPK): CPT

## 2022-06-17 PROCEDURE — 87077 CULTURE AEROBIC IDENTIFY: CPT

## 2022-06-17 PROCEDURE — 83690 ASSAY OF LIPASE: CPT

## 2022-06-17 RX ORDER — CEFPODOXIME PROXETIL 100 MG
1 TABLET ORAL
Qty: 12 | Refills: 0
Start: 2022-06-17 | End: 2022-06-22

## 2022-06-17 RX ORDER — METRONIDAZOLE 500 MG
1 TABLET ORAL
Qty: 18 | Refills: 0
Start: 2022-06-17 | End: 2022-06-22

## 2022-06-17 RX ADMIN — PANTOPRAZOLE SODIUM 40 MILLIGRAM(S): 20 TABLET, DELAYED RELEASE ORAL at 11:47

## 2022-06-17 RX ADMIN — SODIUM CHLORIDE 75 MILLILITER(S): 9 INJECTION, SOLUTION INTRAVENOUS at 11:47

## 2022-06-17 RX ADMIN — Medication 100 MILLIGRAM(S): at 05:55

## 2022-06-17 RX ADMIN — CEFTRIAXONE 100 MILLIGRAM(S): 500 INJECTION, POWDER, FOR SOLUTION INTRAMUSCULAR; INTRAVENOUS at 13:24

## 2022-06-17 NOTE — PROGRESS NOTE ADULT - ASSESSMENT
cholangitis    Advance diet as tolerated  cont iv antibiotics  f/u repeat cultures  likely dc home with outpatient follow up with surgery  will need ercp after gb surgery at [facility] for sphincteroplasty and stone removals  d/w patient    I reviewed the overnight course of events on the unit, re-confirming the patient history. I discussed the care with the patient and their family  The plan of care was discussed with the physician assistant and modifications were made to the notation where appropriate.   Differential diagnosis and plan of care discussed with patient after the evaluation  35 minutes spent on total encounter of which more than fifty percent of the encounter was spent counseling and/or coordinating care by the attending physician.  Advanced care planning was discussed with patient and family.  Advanced care planning forms were reviewed and discussed.  Risks, benefits and alternatives of gastroenterologic procedures were discussed in detail and all questions were answered.

## 2022-06-17 NOTE — PROGRESS NOTE ADULT - ASSESSMENT
66 yo male with improved cholangitis, bradycardic  -Advance diet  -Ok To d/c home on po abx  -Patient recommended to get a second surgical opinion with Dr Pina or in [place]. I worry about his bradycardia and the need for poss open cholecystectomy if bradycardia ocurs during surgery which the patient doesn't want to have

## 2022-06-17 NOTE — PROGRESS NOTE ADULT - PROBLEM SELECTOR PLAN 1
due to acute cholangitis  due to klebsiella - likely from biliary tract  gi and sx evals noted, s/p ercp with stent, will need cholycystectomy prior to dc  abx changed to rocephin and flagyl per id recs  fu rpt blood cultures
due to acute cholangitis  due to klebsiella - likely from biliary tract  s/p ercp with partial stones removed due to copious pus  plan for or today for cholystectomy, but cancelled this am due to bradycardia  cardio eval  pt assymptomatic  advance diet  continue iv abx  plan per gi and sx  monitor on tele for hr
cholangitis  due to klebsiella - likely from biliary tract- now improving  s/p ercp with partial stones removed due to copious pus  sx cancelled due to transient bradycardia  id fu noted - will change to oral abx with outpt fu  rpt blood cultures remain negative  outpt ercp at [facility]  outpt kay follow up for delayed cholycystectomy  to fu at outpt  discussed with sx and gi
new onset sepsis  due to klebsiella - likely from biliary tract  gi and sx evals noted  lfts are nml  ivf - bp responding  abx changed to rocephin and flagyl per id recs  fu rpt ct a/p with contrast noted

## 2022-06-17 NOTE — PROGRESS NOTE ADULT - PROVIDER SPECIALTY LIST ADULT
Gastroenterology
Surgery
Surgery
Gastroenterology
Infectious Disease
Internal Medicine
Surgery
Cardiology
Critical Care
Gastroenterology
Gastroenterology
Infectious Disease
Surgery
Critical Care
Internal Medicine

## 2022-06-17 NOTE — PROGRESS NOTE ADULT - SUBJECTIVE AND OBJECTIVE BOX
[facility] -- Brigette Ac, Shyanne, Valeriano, Efraín Pierre, Leon Peña: Office # [phone]    Follow Up:  Bradycardia, Cardiac clearance     Subjective/Observations: Patient awake, alert, OOB to chair. Denies chest pain, palpitations and dizziness. Denies any difficulty breathing. No orthopnea and PND. Tolerating room air.     REVIEW OF SYSTEMS: All other review of systems are negative unless indicated above    PAST MEDICAL & SURGICAL HISTORY:  HTN (hypertension)  No significant past surgical history    MEDICATIONS  (STANDING):  cefTRIAXone   IVPB 2000 milliGRAM(s) IV Intermittent every 24 hours  influenza  Vaccine (HIGH DOSE) 0.7 milliLiter(s) IntraMuscular once  lactated ringers. 1000 milliLiter(s) (75 mL/Hr) IV Continuous <Continuous>  metroNIDAZOLE  IVPB 500 milliGRAM(s) IV Intermittent every 8 hours  pantoprazole  Injectable 40 milliGRAM(s) IV Push daily    MEDICATIONS  (PRN):  acetaminophen     Tablet .. 650 milliGRAM(s) Oral every 6 hours PRN Temp greater or equal to 38C (100.4F), Moderate Pain (4 - 6)    Allergies  No Known Allergies    Vital Signs Last 24 Hrs  T(C): 36.9 (17 Jun 2022 06:00), Max: 36.9 (17 Jun 2022 06:00)  T(F): 98.5 (17 Jun 2022 06:00), Max: 98.5 (17 Jun 2022 06:00)  HR: 79 (17 Jun 2022 06:00) (54 - 89)  BP: 132/68 (17 Jun 2022 06:00) (132/68 - 143/79)  BP(mean): --  RR: 18 (17 Jun 2022 06:00) (16 - 18)  SpO2: 96% (17 Jun 2022 06:00) (95% - 96%)  I&O's Summary    16 Jun 2022 07:01  -  17 Jun 2022 07:00  --------------------------------------------------------  IN: 1050 mL / OUT: 0 mL / NET: 1050 mL    TELE: SR 50-60s   PHYSICAL EXAM:  Constitutional: NAD, awake and alert, Well developed   HEENT: Moist Mucous Membranes, Anicteric  Pulmonary: Non-labored, breath sounds are clear bilaterally, No wheezing, rales or rhonchi  Cardiovascular: Regular, S1 and S2, No murmurs, No rubs, gallops or clicks  Gastrointestinal:  soft, nontender, nondistended   Lymph: No peripheral edema. No lymphadenopathy.   Skin: No visible rashes or ulcers.  Psych:  Mood & affect appropriate      LABS: All Labs Reviewed:                        13.4   12.18 )-----------( 163      ( 17 Jun 2022 09:17 )             39.1                         13.0   15.32 )-----------( 146      ( 16 Jun 2022 06:15 )             35.5                         12.2   15.07 )-----------( 106      ( 15 Garett 2022 06:46 )             37.2     17 Jun 2022 09:17    139    |  104    |  10     ----------------------------<  91     3.4     |  27     |  0.72   16 Jun 2022 06:15    137    |  103    |  10     ----------------------------<  143    3.5     |  28     |  0.83   15 Garett 2022 06:46    136    |  105    |  9      ----------------------------<  79     3.5     |  24     |  0.71     Ca    8.1        17 Jun 2022 09:17  Ca    8.5        16 Jun 2022 06:15  Ca    8.1        15 Garett 2022 06:46  Phos  2.2       16 Jun 2022 06:15  Phos  1.9       15 Garett 2022 06:46  Mg     2.1       16 Jun 2022 06:15  Mg     2.0       15 Garett 2022 06:46    TPro  6.1    /  Alb  2.6    /  TBili  0.4    /  DBili  x      /  AST  22     /  ALT  25     /  AlkPhos  86     17 Jun 2022 09:17  TPro  6.1    /  Alb  2.5    /  TBili  0.4    /  DBili  x      /  AST  19     /  ALT  25     /  AlkPhos  92     16 Jun 2022 06:15  TPro  5.5    /  Alb  2.4    /  TBili  0.6    /  DBili  x      /  AST  29     /  ALT  30     /  AlkPhos  92     15 Garett 2022 06:46   LIVER FUNCTIONS - ( 17 Jun 2022 09:17 )  Alb: 2.6 g/dL / Pro: 6.1 g/dL / ALK PHOS: 86 U/L / ALT: 25 U/L / AST: 22 U/L / GGT: x           PT/INR - ( 16 Jun 2022 06:15 )   PT: 17.0 sec;   INR: 1.45 ratio         PTT - ( 16 Jun 2022 06:15 )  PTT:29.5 secCreatine Kinase, Serum: 60 U/L (06-16-22 @ 08:43)  Troponin I, High Sensitivity Result: 5.6 ng/L (06-16-22 @ 08:43)    12 Lead ECG:   Ventricular Rate 41 BPM    Atrial Rate 41 BPM    P-R Interval 158 ms    QRS Duration 102 ms    Q-T Interval 500 ms    QTC Calculation(Bazett) 412 ms    P Axis 50 degrees    R Axis -6 degrees    T Axis -11 degrees    Diagnosis Line Marked sinus bradycardia  Low voltage QRS  Nonspecific T wave abnormality  Abnormal ECG  When compared with ECG of 13-JUN-2022 11:42,  Vent. rate has decreased BY  63 BPM  Nonspecific T wave abnormality has replaced inverted T waves in Lateral leads  Confirmed by Eben Bal MD ([id number]) on 6/16/2022 1:10:12 PM (06-16-22 @ 08:17)

## 2022-06-17 NOTE — PROGRESS NOTE ADULT - REASON FOR ADMISSION
abd pain with fever

## 2022-06-17 NOTE — PROGRESS NOTE ADULT - SUBJECTIVE AND OBJECTIVE BOX
[facility] DIVISION of INFECTIOUS DISEASE  Eben Pena MD PhD, Neva Marinelli MD, Nancy Miller MD, Isabel Kenny MD, Hermann Sales MD  and providing coverage with Varsha Harris MD  Providing Infectious Disease Consultations at [facility], [facility], [facility]'s    Office# [phone] to schedule follow up appointments  Answering Service for urgent calls or New Consults [phone]  Cell# to text for urgent issues Eben Pena [phone]     infectious diseases progress note:    DIONICIO PACKER is a 67y y. o. Male patient    Surgery aborted with bradycardia    Allergies    No Known Allergies    Intolerances        ANTIBIOTICS/RELEVANT:  antimicrobials  cefTRIAXone   IVPB 2000 milliGRAM(s) IV Intermittent every 24 hours  metroNIDAZOLE  IVPB 500 milliGRAM(s) IV Intermittent every 8 hours    immunologic:  influenza  Vaccine (HIGH DOSE) 0.7 milliLiter(s) IntraMuscular once    OTHER:  acetaminophen     Tablet .. 650 milliGRAM(s) Oral every 6 hours PRN  lactated ringers. 1000 milliLiter(s) IV Continuous <Continuous>  pantoprazole  Injectable 40 milliGRAM(s) IV Push daily      Objective:  Vital Signs Last 24 Hrs  T(C): 36.9 (17 Jun 2022 06:00), Max: 36.9 (17 Jun 2022 06:00)  T(F): 98.5 (17 Jun 2022 06:00), Max: 98.5 (17 Jun 2022 06:00)  HR: 79 (17 Jun 2022 06:00) (54 - 89)  BP: 132/68 (17 Jun 2022 06:00) (132/68 - 144/81)  BP(mean): --  RR: 18 (17 Jun 2022 06:00) (16 - 18)  SpO2: 96% (17 Jun 2022 06:00) (95% - 96%)    T(C): 36.9 (06-17-22 @ 06:00), Max: 37.4 (06-15-22 @ 10:40)  T(C): 36.9 (06-17-22 @ 06:00), Max: 39.1 (06-14-22 @ 21:00)  T(C): 36.9 (06-17-22 @ 06:00), Max: 40.5 (06-13-22 @ 10:45)    PHYSICAL EXAM:  HEENT: NC atraumatic  Neck: supple  Respiratory: no accessory muscle use, breathing comfortably  Cardiovascular: distant  Gastrointestinal: normal appearing, nondistended  Extremities: no clubbing, no cyanosis,        LABS:                          13.0   15.32 )-----------( 146      ( 16 Jun 2022 06:15 )             35.5       WBC  15.32 06-16 @ 06:15  15.07 06-15 @ 06:46  17.32 06-14 @ 07:13  19.26 06-13 @ 21:12  8.00 06-13 @ 12:30  10.07 06-12 @ 18:48      06-16    137  |  103  |  10  ----------------------------<  143<H>  3.5   |  28  |  0.83    Ca    8.5      16 Jun 2022 06:15  Phos  2.2     06-16  Mg     2.1     06-16    TPro  6.1  /  Alb  2.5<L>  /  TBili  0.4  /  DBili  x   /  AST  19  /  ALT  25  /  AlkPhos  92  06-16      Creatinine, Serum: 0.83 mg/dL (06-16-22 @ 06:15)  Creatinine, Serum: 0.71 mg/dL (06-15-22 @ 06:46)  Creatinine, Serum: 0.84 mg/dL (06-14-22 @ 07:13)  Creatinine, Serum: 0.88 mg/dL (06-13-22 @ 21:12)  Creatinine, Serum: 0.85 mg/dL (06-13-22 @ 12:30)  Creatinine, Serum: 0.87 mg/dL (06-12-22 @ 18:48)      PT/INR - ( 16 Jun 2022 06:15 )   PT: 17.0 sec;   INR: 1.45 ratio         PTT - ( 16 Jun 2022 06:15 )  PTT:29.5 sec          INFLAMMATORY MARKERS  Auto Neutrophil #: 13.75 K/uL (06-16-22 @ 06:15)  Auto Lymphocyte #: 0.63 K/uL (06-16-22 @ 06:15)  Auto Neutrophil #: 12.45 K/uL (06-15-22 @ 06:46)  Auto Lymphocyte #: 0.80 K/uL (06-15-22 @ 06:46)  Auto Neutrophil #: 17.25 K/uL (06-13-22 @ 21:12)  Auto Lymphocyte #: 0.84 K/uL (06-13-22 @ 21:12)  Auto Neutrophil #: 7.54 K/uL (06-13-22 @ 12:30)  Auto Lymphocyte #: 0.38 K/uL (06-13-22 @ 12:30)  Auto Neutrophil #: 8.52 K/uL (06-12-22 @ 18:48)  Auto Lymphocyte #: 1.03 K/uL (06-12-22 @ 18:48)    Lactate, Blood: 1.2 mmol/L (06-14-22 @ 07:13)  Lactate, Blood: 1.6 mmol/L (06-13-22 @ 16:21)  Lactate, Blood: 4.5 mmol/L (06-13-22 @ 12:30)    Auto Eosinophil #: 0.00 K/uL (06-16-22 @ 06:15)  Auto Eosinophil #: 0.00 K/uL (06-15-22 @ 06:46)  Auto Eosinophil #: 0.01 K/uL (06-13-22 @ 21:12)  Auto Eosinophil #: 0.00 K/uL (06-13-22 @ 12:30)  Auto Eosinophil #: 0.02 K/uL (06-12-22 @ 18:48)        Procalcitonin, Serum: <0.05 (06-13-22 @ 21:12)      Creatine Kinase, Serum: 60 U/L (06-16-22 @ 08:43)              INR: 1.45 ratio (06-16-22 @ 06:15)  Activated Partial Thromboplastin Time: 29.5 sec (06-16-22 @ 06:15)  INR: 1.25 ratio (06-13-22 @ 12:30)  Activated Partial Thromboplastin Time: 26.3 sec (06-13-22 @ 12:30)  INR: 1.14 ratio (06-12-22 @ 18:48)  Activated Partial Thromboplastin Time: 30.8 sec (06-12-22 @ 18:48)          MICROBIOLOGY:              RADIOLOGY & ADDITIONAL STUDIES:

## 2022-06-17 NOTE — PROGRESS NOTE ADULT - SUBJECTIVE AND OBJECTIVE BOX
[facility]  Denilson Painter PA-C  [facility] [place] [address] [phone]      INTERVAL HPI/OVERNIGHT EVENTS:  Pt s/e  Reports no abdominal pain or further GI complaints    MEDICATIONS  (STANDING):  cefTRIAXone   IVPB 2000 milliGRAM(s) IV Intermittent every 24 hours  influenza  Vaccine (HIGH DOSE) 0.7 milliLiter(s) IntraMuscular once  lactated ringers. 1000 milliLiter(s) (75 mL/Hr) IV Continuous <Continuous>  metroNIDAZOLE  IVPB 500 milliGRAM(s) IV Intermittent every 8 hours  pantoprazole  Injectable 40 milliGRAM(s) IV Push daily    MEDICATIONS  (PRN):  acetaminophen     Tablet .. 650 milliGRAM(s) Oral every 6 hours PRN Temp greater or equal to 38C (100.4F), Moderate Pain (4 - 6)      Allergies  No Known Allergies      PHYSICAL EXAM:   Vital Signs:  Vital Signs Last 24 Hrs  T(C): 36.6 (17 Jun 2022 12:33), Max: 36.9 (17 Jun 2022 06:00)  T(F): 97.9 (17 Jun 2022 12:33), Max: 98.5 (17 Jun 2022 06:00)  HR: 52 (17 Jun 2022 12:33) (52 - 79)  BP: 127/71 (17 Jun 2022 12:33) (127/71 - 143/79)  BP(mean): --  RR: 18 (17 Jun 2022 12:33) (16 - 18)  SpO2: 97% (17 Jun 2022 12:33) (96% - 97%)  Daily     Daily     GENERAL:  Appears stated age  ABDOMEN:  Soft, non-tender, non-distended  NEURO:  Alert      LABS:                        13.4   12.18 )-----------( 163      ( 17 Jun 2022 09:17 )             39.1     06-17    139  |  104  |  10  ----------------------------<  91  3.4<L>   |  27  |  0.72    Ca    8.1<L>      17 Jun 2022 09:17  Phos  2.2     06-16  Mg     2.1     06-16    TPro  6.1  /  Alb  2.6<L>  /  TBili  0.4  /  DBili  x   /  AST  22  /  ALT  25  /  AlkPhos  86  06-17    PT/INR - ( 16 Jun 2022 06:15 )   PT: 17.0 sec;   INR: 1.45 ratio         PTT - ( 16 Jun 2022 06:15 )  PTT:29.5 sec

## 2022-06-17 NOTE — PROGRESS NOTE ADULT - NS ATTEND AMEND GEN_ALL_CORE FT
Chart reviewed    Patient seen and examined    Agree with plan as outlined above    66 yo male with PMHx of HTN now admitted with sepsis 2/2 to pneumobilia- klebsiella oxytoca, scheduled for laparoscopic cholecystectomy today found to have new onset bradycardia.    Bradycardia, HTN, Cardiac clearance   - Patient has been bradycardic with HR 40s-50s, now 50-60s in past 24 hours, usual hr at home reportedly ~70.    - On admission EKG shows patient in sinus tachycardia- likely 2/2 to pain from acute cholecystitis and infection   - Repeat EKG shows sinus bradycardia, low voltage qrs, 41bpm  - Per patient, was being evaluated for underlying ischemia every 2 yrs with nuclear stress tests, which were normal   - No acute signs of ischemia at this time   - Sinus bradycardia possible from residual effects of Midodrine given 6/14 combined with acute abdominal process with associated elevated vagal tone  - Surgery cancelled and apparently no urgency to reschedule. Likely dc home with outpatient follow up with surgery  - Patient to follow up with Dr. Bal outpatient for further workup   - Continue medical management with antibiotics   - BP well controlled, monitor routine hemodynamics.  - Not on any bp medications in hospital, takes Telmisartan 20mg daily at home   - Patient BP wnl, may consider restarting Telmisartan if blood pressure requires

## 2022-06-17 NOTE — PROGRESS NOTE ADULT - SUBJECTIVE AND OBJECTIVE BOX
Patient is a 67y old  Male who presents with a chief complaint of abd pain with fever (17 Jun 2022 10:47)      INTERVAL HPI/OVERNIGHT EVENTS: stable, feels well, id and sx and gi noted, for dc home on oral abx and outpt ercp with cholycystectomyyyyy    MEDICATIONS  (STANDING):  cefTRIAXone   IVPB 2000 milliGRAM(s) IV Intermittent every 24 hours  influenza  Vaccine (HIGH DOSE) 0.7 milliLiter(s) IntraMuscular once  lactated ringers. 1000 milliLiter(s) (75 mL/Hr) IV Continuous <Continuous>  metroNIDAZOLE  IVPB 500 milliGRAM(s) IV Intermittent every 8 hours  pantoprazole  Injectable 40 milliGRAM(s) IV Push daily    MEDICATIONS  (PRN):  acetaminophen     Tablet .. 650 milliGRAM(s) Oral every 6 hours PRN Temp greater or equal to 38C (100.4F), Moderate Pain (4 - 6)      Allergies    No Known Allergies    Intolerances        REVIEW OF SYSTEMS:  CONSTITUTIONAL: No fever, weight loss, or fatigue  EYES: No eye pain, visual disturbances  ENMT:  No difficulty hearing, tinnitus, vertigo; No sinus or throat pain  NECK: No pain or stiffness  RESPIRATORY: No cough, wheezing, chills or hemoptysis; No shortness of breath  CARDIOVASCULAR: No chest pain, palpitations, dizziness  GASTROINTESTINAL: No abdominal or epigastric pain. No nausea, vomiting, or hematemesis; No diarrhea or constipation. No melena or hematochezia.  GENITOURINARY: No dysuria, frequency, hematuria, or incontinence  NEUROLOGICAL: No headaches, memory loss, loss of strength, numbness, or tremors  SKIN: No itching, burning  LYMPH NODES: No enlarged glands  MUSCULOSKELETAL: No joint pain or swelling; No muscle, back, or extremity pain  PSYCHIATRIC: No depression, mood swings  HEME/LYMPH: No easy bruising, or bleeding gums  ALLERGY AND IMMUNOLOGIC: No hives    Vital Signs Last 24 Hrs  T(C): 36.9 (17 Jun 2022 06:00), Max: 36.9 (17 Jun 2022 06:00)  T(F): 98.5 (17 Jun 2022 06:00), Max: 98.5 (17 Jun 2022 06:00)  HR: 79 (17 Jun 2022 06:00) (54 - 89)  BP: 132/68 (17 Jun 2022 06:00) (132/68 - 143/79)  BP(mean): --  RR: 18 (17 Jun 2022 06:00) (16 - 18)  SpO2: 96% (17 Jun 2022 06:00) (95% - 96%)    PHYSICAL EXAM:  GENERAL: NAD, well-groomed, well-developed  HEAD:  Atraumatic, Normocephalic  EYES: EOMI, PERRLA, conjunctiva and sclera clear  ENMT: No tonsillar erythema, exudates, or enlargement   NECK: Supple, No JVD  NERVOUS SYSTEM:  Alert & Oriented X3, Good concentration  CHEST/LUNG: Clear to auscultation bilaterally; No rales, rhonchi, wheezing  HEART: Regular rate and rhythm  ABDOMEN: Soft, Nontender, Nondistended; Bowel sounds present  EXTREMITIES:  2+ Peripheral Pulses   LYMPH: No lymphadenopathy noted  SKIN: No rashes     LABS:                        13.4   12.18 )-----------( 163      ( 17 Jun 2022 09:17 )             39.1     17 Jun 2022 09:17    139    |  104    |  10     ----------------------------<  91     3.4     |  27     |  0.72     Ca    8.1        17 Jun 2022 09:17    TPro  6.1    /  Alb  2.6    /  TBili  0.4    /  DBili  x      /  AST  22     /  ALT  25     /  AlkPhos  86     17 Jun 2022 09:17    PT/INR - ( 16 Jun 2022 06:15 )   PT: 17.0 sec;   INR: 1.45 ratio         PTT - ( 16 Jun 2022 06:15 )  PTT:29.5 sec    CAPILLARY BLOOD GLUCOSE      POCT Blood Glucose.: 116 mg/dL (16 Jun 2022 16:56)    blood culture --   No growth to date.   06-15 @ 16:37    blood culture --   Growth in aerobic and anaerobic bottles: Klebsiella oxytoca/Raoutella  ornithinolytica  Growth in anaerobic bottle: Escherichia coli Susceptibility to follow.  ***Blood Panel PCR results on this specimen are available  approximately 3 hours after the Gram stain result.***  Gram stain, PCR, and/or culture results may not always  correspond due to difference in methodologies.  ************************************************************  This PCR assay was performed by multiplex PCR. This  Assay tests for 66 bacterial and resistance gene targets.  Please refer to the [facility] Molecular Imaging test directory  at [email or web address] for details.   06-13 @ 17:51    blood culture --   <10,000 CFU/mL Normal Urogenital Renetta   06-13 @ 17:50      urine culture --  06-15 @ 16:37  results   No growth to date. 06-15 @ 16:37  urine culture --  06-13 @ 17:51  results   Growth in aerobic and anaerobic bottles: Klebsiella oxytoca/Raoutella  ornithinolytica  Growth in anaerobic bottle: Escherichia coli Susceptibility to follow.  ***Blood Panel PCR results on this specimen are available  approximately 3 hours after the Gram stain result.***  Gram stain, PCR, and/or culture results may not always  correspond due to difference in methodologies.  ************************************************************  This PCR assay was performed by multiplex PCR. This  Assay tests for 66 bacterial and resistance gene targets.  Please refer to the [facility] Molecular Imaging test directory  at [email or web address] for details. 06-13 @ 17:51  urine culture --  06-13 @ 17:50  results   <10,000 CFU/mL Normal Urogenital Renetta 06-13 @ 17:50    wound with gram statin --    06-15 @ 16:37  organism  --   06-15 @ 16:37  specimen source .Blood Blood-Peripheral  06-15 @ 16:37  wound with gram statin --    06-13 @ 17:51  organism  Blood Culture PCR   06-13 @ 17:51  specimen source .Blood Blood-Peripheral  06-13 @ 17:51  wound with gram statin --    06-13 @ 17:50  organism  --   06-13 @ 17:50  specimen source Clean Catch Clean Catch (Midstream)  06-13 @ 17:50      RADIOLOGY & ADDITIONAL TESTS:      Consultant(s) Notes Reviewed:  [ x] YES  [ ] NO    Care Discussed with Consultants/Other Providers [ x] YES  [ ] NO    Advanced care planning discussed with patient and family, advanced care planning forms reviewed, discussed, and completed.  20 minutes spent.

## 2022-06-17 NOTE — PROGRESS NOTE ADULT - SUBJECTIVE AND OBJECTIVE BOX
66 yo male with cholangitis, CBD stone stent, bradycardic, HR 59 -70 last night, no abd pain, tolerating diet    Abd soft, NT              Vital Signs Last 24 Hrs  T(C): 36.9 (17 Jun 2022 06:00), Max: 36.9 (17 Jun 2022 06:00)  T(F): 98.5 (17 Jun 2022 06:00), Max: 98.5 (17 Jun 2022 06:00)  HR: 79 (17 Jun 2022 06:00) (54 - 89)  BP: 132/68 (17 Jun 2022 06:00) (132/68 - 144/81)  BP(mean): --  RR: 18 (17 Jun 2022 06:00) (16 - 18)  SpO2: 96% (17 Jun 2022 06:00) (95% - 96%)                          13.0   15.32 )-----------( 146      ( 16 Jun 2022 06:15 )             35.5       06-17    139  |  104  |  10  ----------------------------<  91  3.4<L>   |  27  |  0.72    Ca    8.1<L>      17 Jun 2022 09:17  Phos  2.2     06-16  Mg     2.1     06-16    TPro  6.1  /  Alb  2.6<L>  /  TBili  0.4  /  DBili  x   /  AST  22  /  ALT  25  /  AlkPhos  86  06-17      LIVER FUNCTIONS - ( 17 Jun 2022 09:17 )  Alb: 2.6 g/dL / Pro: 6.1 g/dL / ALK PHOS: 86 U/L / ALT: 25 U/L / AST: 22 U/L / GGT: x             MEDICATIONS  (STANDING):  cefTRIAXone   IVPB 2000 milliGRAM(s) IV Intermittent every 24 hours  influenza  Vaccine (HIGH DOSE) 0.7 milliLiter(s) IntraMuscular once  lactated ringers. 1000 milliLiter(s) (75 mL/Hr) IV Continuous <Continuous>  metroNIDAZOLE  IVPB 500 milliGRAM(s) IV Intermittent every 8 hours  pantoprazole  Injectable 40 milliGRAM(s) IV Push daily    MEDICATIONS  (PRN):  acetaminophen     Tablet .. 650 milliGRAM(s) Oral every 6 hours PRN Temp greater or equal to 38C (100.4F), Moderate Pain (4 - 6)      MEDICATIONS  (STANDING):  cefTRIAXone   IVPB 2000 milliGRAM(s) IV Intermittent every 24 hours  influenza  Vaccine (HIGH DOSE) 0.7 milliLiter(s) IntraMuscular once  lactated ringers. 1000 milliLiter(s) (75 mL/Hr) IV Continuous <Continuous>  metroNIDAZOLE  IVPB 500 milliGRAM(s) IV Intermittent every 8 hours  pantoprazole  Injectable 40 milliGRAM(s) IV Push daily

## 2022-06-17 NOTE — PROGRESS NOTE ADULT - ASSESSMENT
68yo M  pw abdominal pain, chills and vomiting, was seen in ED 6/12 for the same, He experienced the same pain in September (was in [place] at the time-he is from [facility]) - where he was told he had gallstones, a dilated common bile duct, and underwent an ERCP (states a stone was removed) Since being back in [place], the pt was seeing Dr. Ramsay who told him to follow a low fat diet which he reports doing for the last 1 year. Admits to chills, nausea, vomiting (x7 - food contents, no hematemesis), in ED US and CT noted dilated CBD, and gallstones no cholecystitis and no LFT or bili abnormality, was seen by surgery, no intervention recommended, was told to rusty ramsay. pt states he called dr ramsay office but with continued vomiting and was told to go to ED. ERCP scheduled for 6/14 but pt deferred. Noted to have positive blood cultures and ID consulted.  CT-New pneumobilia, likely related to recent passage of a ductal stone. New periportal edema, perihepatic fluid, and gallbladder  inflammation    RECOMMENDATIONS  acute biliary stone cholecystitis and that the Klebsiella bacteremia is from a biliary source.  6/15-ERCP,SPHINTEROTOMY,STONE REMOVAL, STENT INSERTION,FLUORO - noted pus and stones   continue Ceftriaxone/Metronidazole      polymicrobial bacteremia (Klebsiella bacteremia/Streptococcus anginosus group)  6/15-repeat blood cultures NGTD    6/17-as long as repeat blood cultures remain NGTD at 48 hours which will be later today then fine with dc on  Vantin 200mg PO BID and  Metronidazole 500mg PO TID with last day 6/22    plan for delayed surgery to be scheduled  We will follow along in the care of this patient. Please call us at [phone] or text me directly on my cell# at [phone] with any concerns.

## 2022-06-17 NOTE — PROGRESS NOTE ADULT - ASSESSMENT
68 yo male with PMHx of HTN now admitted with sepsis 2/2 to pneumobilia- klebsiella oxytoca, scheduled for laparoscopic cholecystectomy today found to have new onset bradycardia.    Bradycardia, HTN, Cardiac clearance   - Patient has been bradycardic with HR 40s-50s, now 50-60s in past 24 hours, usual hr at home reportedly ~70.    - On admission EKG shows patient in sinus tachycardia- likely 2/2 to pain from acute cholecystitis and infection   - Repeat EKG shows sinus bradycardia, low voltage qrs, 41bpm  - Per patient, was being evaluated for underlying ischemia every 2 yrs with nuclear stress tests, which were normal   - No acute signs of ischemia at this time   - Sinus bradycardia possible from residual effects of Midodrine given 6/14 combined with acute abdominal process with associated elevated vagal tone  - Surgery cancelled and apparently no urgency to reschedule. Likely dc home with outpatient follow up with surgery  - Patient to follow up with Dr. Bal outpatient for further workup   - Continue medical management with antibiotics   - BP well controlled, monitor routine hemodynamics.  - Not on any bp medications in hospital, takes Telmisartan 20mg daily at home   - Patient BP wnl, may consider restarting Telmisartan if blood pressure requires     - Monitor and replete lytes, keep K>4, Mg>2.  - Will continue to follow.    Bennie Viramontes, MS FNP, [facility]  Nurse Practitioner- Cardiology   Spectra #[phone] /[phone]

## 2022-06-17 NOTE — CHART NOTE - NSCHARTNOTEFT_GEN_A_CORE
Called by RN to give patient printed labs and ERCP report.    - Labs and ERCP printing and physically handed to patient  - All questions regarding course answered   - RN to call if any changes    Dr. Fang PGY-2  [phone]

## 2022-06-17 NOTE — DISCHARGE NOTE NURSING/CASE MANAGEMENT/SOCIAL WORK - NSDCPEFALRISK_GEN_ALL_CORE
For information on Fall & Injury Prevention, visit: [email or web address] OR  [email or web address] OR  https://www.cdc.gov/steadi/patient.html

## 2022-06-17 NOTE — DISCHARGE NOTE NURSING/CASE MANAGEMENT/SOCIAL WORK - PATIENT PORTAL LINK FT
You can access the FollowMyHealth Patient Portal offered by [facility] by registering at the following website: [email or web address]. By joining [employer]’s FollowMyHealth portal, you will also be able to view your health information using other applications (apps) compatible with our system.

## 2022-06-18 LAB
-  AMIKACIN: SIGNIFICANT CHANGE UP
-  AMPICILLIN/SULBACTAM: SIGNIFICANT CHANGE UP
-  AMPICILLIN: SIGNIFICANT CHANGE UP
-  AZTREONAM: SIGNIFICANT CHANGE UP
-  CEFAZOLIN: SIGNIFICANT CHANGE UP
-  CEFEPIME: SIGNIFICANT CHANGE UP
-  CEFTRIAXONE: SIGNIFICANT CHANGE UP
-  CIPROFLOXACIN: SIGNIFICANT CHANGE UP
-  ERTAPENEM: SIGNIFICANT CHANGE UP
-  GENTAMICIN: SIGNIFICANT CHANGE UP
-  IMIPENEM: SIGNIFICANT CHANGE UP
-  LEVOFLOXACIN: SIGNIFICANT CHANGE UP
-  MEROPENEM: SIGNIFICANT CHANGE UP
-  PIPERACILLIN/TAZOBACTAM: SIGNIFICANT CHANGE UP
-  TOBRAMYCIN: SIGNIFICANT CHANGE UP
-  TRIMETHOPRIM/SULFAMETHOXAZOLE: SIGNIFICANT CHANGE UP
CULTURE RESULTS: SIGNIFICANT CHANGE UP
METHOD TYPE: SIGNIFICANT CHANGE UP
ORGANISM # SPEC MICROSCOPIC CNT: SIGNIFICANT CHANGE UP
SPECIMEN SOURCE: SIGNIFICANT CHANGE UP

## 2022-06-20 LAB
CULTURE RESULTS: SIGNIFICANT CHANGE UP
CULTURE RESULTS: SIGNIFICANT CHANGE UP
SPECIMEN SOURCE: SIGNIFICANT CHANGE UP
SPECIMEN SOURCE: SIGNIFICANT CHANGE UP

## 2022-06-21 ENCOUNTER — APPOINTMENT (OUTPATIENT)
Dept: INTERNAL MEDICINE | Facility: CLINIC | Age: 67
End: 2022-06-21

## 2022-06-21 PROBLEM — I10 ESSENTIAL (PRIMARY) HYPERTENSION: Chronic | Status: ACTIVE | Noted: 2022-06-12

## 2022-06-22 ENCOUNTER — APPOINTMENT (OUTPATIENT)
Dept: INTERNAL MEDICINE | Facility: CLINIC | Age: 67
End: 2022-06-22
Payer: MEDICARE

## 2022-06-22 ENCOUNTER — LABORATORY RESULT (OUTPATIENT)
Age: 67
End: 2022-06-22

## 2022-06-22 VITALS
HEIGHT: 67 IN | RESPIRATION RATE: 14 BRPM | DIASTOLIC BLOOD PRESSURE: 76 MMHG | WEIGHT: 140 LBS | SYSTOLIC BLOOD PRESSURE: 120 MMHG | HEART RATE: 67 BPM | BODY MASS INDEX: 21.97 KG/M2 | OXYGEN SATURATION: 99 %

## 2022-06-22 DIAGNOSIS — H34.8392 TRIBUTARY (BRANCH) RETINAL VEIN OCCLUSION, UNSPECIFIED EYE, STABLE: ICD-10-CM

## 2022-06-22 DIAGNOSIS — Z82.49 FAMILY HISTORY OF ISCHEMIC HEART DISEASE AND OTHER DISEASES OF THE CIRCULATORY SYSTEM: ICD-10-CM

## 2022-06-22 PROCEDURE — 99496 TRANSJ CARE MGMT HIGH F2F 7D: CPT

## 2022-06-22 NOTE — PLAN
[FreeTextEntry1] : Further instructions pending lab results \par Follow up with GI \par Surgery as per Dr. Wing \par Continue antibiotic \par

## 2022-06-22 NOTE — HISTORY OF PRESENT ILLNESS
[Post-hospitalization from ___ Hospital] : Post-hospitalization from [unfilled] Hospital [Admitted on: ___] : The patient was admitted on [unfilled] [Discharged on ___] : discharged on [unfilled] [Patient Contacted By: ____] : and contacted by [unfilled] [FreeTextEntry2] : DIONICIO PACKER is a 67 year old M who presents today for new patient and hospital follow up. Pt went to the hospital for vommitting and fever. Pt was found to have cholangitis. Pt was started on IV antibiotics. Pt is to follow up with gastrosurgeon. Pt has stopped BP medication for 1 week. Pt is also on antibiotic Vantin.

## 2022-06-22 NOTE — END OF VISIT
[FreeTextEntry3] : "I, Padmini Chaparro, personally scribed the services dictated to me by Dr. Luigi Candelario MD in this documentation on 06/22/2022 " \par \par "I Dr. Luigi Candelario MD, personally performed the services described in this documentation on 06/22/2022 for the patient as scribed by Padmini Chaparro in my presence. I have reviewed and verified that all the information is accurate and true."\par

## 2022-06-22 NOTE — HEALTH RISK ASSESSMENT
[Never] : Never [No] : In the past 12 months have you used drugs other than those required for medical reasons? No [No falls in past year] : Patient reported no falls in the past year [0] : 2) Feeling down, depressed, or hopeless: Not at all (0) [PHQ-2 Negative - No further assessment needed] : PHQ-2 Negative - No further assessment needed [[id number]] : 0

## 2022-06-23 ENCOUNTER — APPOINTMENT (OUTPATIENT)
Dept: SURGERY | Facility: CLINIC | Age: 67
End: 2022-06-23
Payer: MEDICARE

## 2022-06-23 VITALS
WEIGHT: 140 LBS | HEIGHT: 67 IN | SYSTOLIC BLOOD PRESSURE: 129 MMHG | DIASTOLIC BLOOD PRESSURE: 87 MMHG | BODY MASS INDEX: 21.97 KG/M2 | OXYGEN SATURATION: 100 % | HEART RATE: 79 BPM

## 2022-06-23 PROCEDURE — 99203 OFFICE O/P NEW LOW 30 MIN: CPT

## 2022-06-25 ENCOUNTER — NON-APPOINTMENT (OUTPATIENT)
Age: 67
End: 2022-06-25

## 2022-06-25 LAB
25(OH)D3 SERPL-MCNC: 16 NG/ML
ALBUMIN SERPL ELPH-MCNC: 4.1 G/DL
ALP BLD-CCNC: 85 U/L
ALT SERPL-CCNC: 55 U/L
ANION GAP SERPL CALC-SCNC: 10 MMOL/L
AST SERPL-CCNC: 44 U/L
BASOPHILS # BLD AUTO: 0.06 K/UL
BASOPHILS NFR BLD AUTO: 0.9 %
BILIRUB SERPL-MCNC: 0.3 MG/DL
BUN SERPL-MCNC: 7 MG/DL
CALCIUM SERPL-MCNC: 9.2 MG/DL
CHLORIDE SERPL-SCNC: 97 MMOL/L
CHOLEST SERPL-MCNC: 147 MG/DL
CK SERPL-CCNC: 34 U/L
CO2 SERPL-SCNC: 28 MMOL/L
CREAT SERPL-MCNC: 0.67 MG/DL
EGFR: 104 ML/MIN/1.73M2
EOSINOPHIL # BLD AUTO: 0.12 K/UL
EOSINOPHIL NFR BLD AUTO: 1.7 %
ESTIMATED AVERAGE GLUCOSE: 120 MG/DL
GLUCOSE SERPL-MCNC: 96 MG/DL
HBA1C MFR BLD HPLC: 5.8 %
HCT VFR BLD CALC: 42.3 %
HDLC SERPL-MCNC: 34 MG/DL
HGB BLD-MCNC: 13.4 G/DL
LDLC SERPL CALC-MCNC: 69 MG/DL
LYMPHOCYTES # BLD AUTO: 2.5 K/UL
LYMPHOCYTES NFR BLD AUTO: 35.6 %
MAN DIFF?: NORMAL
MCHC RBC-ENTMCNC: 28.8 PG
MCHC RBC-ENTMCNC: 31.7 GM/DL
MCV RBC AUTO: 91 FL
MONOCYTES # BLD AUTO: 1.29 K/UL
MONOCYTES NFR BLD AUTO: 18.3 %
NEUTROPHILS # BLD AUTO: 2.81 K/UL
NEUTROPHILS NFR BLD AUTO: 40 %
NONHDLC SERPL-MCNC: 114 MG/DL
PLATELET # BLD AUTO: 311 K/UL
POTASSIUM SERPL-SCNC: 5 MMOL/L
PROT SERPL-MCNC: 6.8 G/DL
RBC # BLD: 4.65 M/UL
RBC # FLD: 13 %
SODIUM SERPL-SCNC: 134 MMOL/L
TRIGL SERPL-MCNC: 224 MG/DL
TSH SERPL-ACNC: 1.97 UIU/ML
WBC # FLD AUTO: 7.03 K/UL

## 2022-06-25 RX ORDER — CHOLECALCIFEROL (VITAMIN D3) 50 MCG
50 MCG TABLET ORAL
Qty: 100 | Refills: 0 | Status: ACTIVE | COMMUNITY

## 2022-06-30 ENCOUNTER — OUTPATIENT (OUTPATIENT)
Dept: OUTPATIENT SERVICES | Facility: HOSPITAL | Age: 67
LOS: 1 days | End: 2022-06-30
Payer: MEDICARE

## 2022-06-30 VITALS
SYSTOLIC BLOOD PRESSURE: 112 MMHG | RESPIRATION RATE: 17 BRPM | DIASTOLIC BLOOD PRESSURE: 77 MMHG | HEIGHT: 67 IN | OXYGEN SATURATION: 99 % | HEART RATE: 70 BPM | WEIGHT: 143.08 LBS | TEMPERATURE: 97 F

## 2022-06-30 DIAGNOSIS — Z98.890 OTHER SPECIFIED POSTPROCEDURAL STATES: Chronic | ICD-10-CM

## 2022-06-30 DIAGNOSIS — K83.09 OTHER CHOLANGITIS: ICD-10-CM

## 2022-06-30 DIAGNOSIS — Z01.818 ENCOUNTER FOR OTHER PREPROCEDURAL EXAMINATION: ICD-10-CM

## 2022-06-30 PROCEDURE — 86901 BLOOD TYPING SEROLOGIC RH(D): CPT

## 2022-06-30 PROCEDURE — G0463: CPT

## 2022-06-30 PROCEDURE — 86900 BLOOD TYPING SEROLOGIC ABO: CPT

## 2022-06-30 PROCEDURE — 36415 COLL VENOUS BLD VENIPUNCTURE: CPT

## 2022-06-30 PROCEDURE — 86850 RBC ANTIBODY SCREEN: CPT

## 2022-06-30 NOTE — H&P PST ADULT - ASSESSMENT
This 69yo m with a PMHX of TIIDM, HTN, HLD, Kidney disease  presents to [facility] for a scheduledColonoscopy  with Dr Reese  on 7/12/22  .

## 2022-06-30 NOTE — H&P PST ADULT - GASTROINTESTINAL COMMENTS
----- Message from Susan Venegas NP sent at 7/30/2019  1:34 PM CDT -----  Please let pt know she is still anemic, but it has improved since her last labs were drawn. Her crp is elevated, however it could be elevated due to her respiratory infection.   see HPI

## 2022-06-30 NOTE — H&P PST ADULT - FALL HARM RISK - HARM RISK INTERVENTIONS

## 2022-06-30 NOTE — H&P PST ADULT - HISTORY OF PRESENT ILLNESS
This  68 yo with a pmhx of with acute onset abdominal pain N/V 6/12/22 was admitted to [facility] and treated for presents to PST for a scheduled Laparoscopic Cholecystectomy possible open  with Dr Pina on 7/11/22. Pt is electing to have this procedure.

## 2022-06-30 NOTE — H&P PST ADULT - NSANTHOSAYNRD_GEN_A_CORE
No. JIN screening performed.  STOP BANG Legend: 0-2 = LOW Risk; 3-4 = INTERMEDIATE Risk; 5-8 = HIGH Risk

## 2022-06-30 NOTE — H&P PST ADULT - PROBLEM SELECTOR PLAN 1
PST: T&S  Medical evaluation with Dr. Candelario  All preoperative instructions including CHD cleanse reviewed with patient who verbalized understanding.   Avoid all NSAID/ASA containing products as well as all herbal/vitamin supplements. Tylenol only for pain/headache.   Covid swab at [place] date TBD. Pt verbalized understanding. Pt received 1 Covid vaccine in [place]

## 2022-07-05 ENCOUNTER — APPOINTMENT (OUTPATIENT)
Dept: CARDIOLOGY | Facility: CLINIC | Age: 67
End: 2022-07-05

## 2022-07-05 ENCOUNTER — APPOINTMENT (OUTPATIENT)
Dept: INTERNAL MEDICINE | Facility: CLINIC | Age: 67
End: 2022-07-05

## 2022-07-05 ENCOUNTER — NON-APPOINTMENT (OUTPATIENT)
Age: 67
End: 2022-07-05

## 2022-07-05 VITALS
WEIGHT: 144 LBS | HEART RATE: 67 BPM | BODY MASS INDEX: 22.6 KG/M2 | SYSTOLIC BLOOD PRESSURE: 115 MMHG | OXYGEN SATURATION: 98 % | HEIGHT: 67 IN | DIASTOLIC BLOOD PRESSURE: 72 MMHG

## 2022-07-05 VITALS
DIASTOLIC BLOOD PRESSURE: 70 MMHG | HEART RATE: 58 BPM | RESPIRATION RATE: 16 BRPM | WEIGHT: 143 LBS | BODY MASS INDEX: 22.4 KG/M2 | SYSTOLIC BLOOD PRESSURE: 117 MMHG | TEMPERATURE: 98.2 F | OXYGEN SATURATION: 98 %

## 2022-07-05 DIAGNOSIS — Z01.818 ENCOUNTER FOR OTHER PREPROCEDURAL EXAMINATION: ICD-10-CM

## 2022-07-05 DIAGNOSIS — K83.09 OTHER CHOLANGITIS: ICD-10-CM

## 2022-07-05 DIAGNOSIS — Z86.79 PERSONAL HISTORY OF OTHER DISEASES OF THE CIRCULATORY SYSTEM: ICD-10-CM

## 2022-07-05 PROCEDURE — 99214 OFFICE O/P EST MOD 30 MIN: CPT

## 2022-07-05 PROCEDURE — 93000 ELECTROCARDIOGRAM COMPLETE: CPT

## 2022-07-05 RX ORDER — METRONIDAZOLE 500 MG/1
500 TABLET ORAL
Qty: 18 | Refills: 0 | Status: COMPLETED | COMMUNITY
Start: 2022-06-20 | End: 2022-07-05

## 2022-07-05 RX ORDER — URSODIOL 400 MG/1
400 CAPSULE ORAL
Qty: 90 | Refills: 0 | Status: COMPLETED | COMMUNITY
Start: 2022-05-23 | End: 2022-07-05

## 2022-07-05 RX ORDER — CEFPODOXIME PROXETIL 200 MG/1
200 TABLET, FILM COATED ORAL
Qty: 12 | Refills: 0 | Status: COMPLETED | COMMUNITY
Start: 2022-06-17 | End: 2022-07-05

## 2022-07-05 NOTE — HISTORY OF PRESENT ILLNESS
[Aortic Stenosis] : no aortic stenosis [Atrial Fibrillation] : no atrial fibrillation [Coronary Artery Disease] : no coronary artery disease [Recent Myocardial Infarction] : no recent myocardial infarction [Implantable Device/Pacemaker] : no implantable device/pacemaker [Asthma] : no asthma [COPD] : no COPD [Sleep Apnea] : no sleep apnea [Smoker] : not a smoker [Family Member] : no family member with adverse anesthesia reaction/sudden death [Self] : no previous adverse anesthesia reaction [Chronic Anticoagulation] : no chronic anticoagulation [Chronic Kidney Disease] : no chronic kidney disease [Diabetes] : no diabetes [FreeTextEntry1] : lap Cholecystectomy  [FreeTextEntry2] : July 11,2022 [FreeTextEntry3] : Dr. Pina [FreeTextEntry4] : Pre Op

## 2022-07-05 NOTE — DISCUSSION/SUMMARY
[FreeTextEntry1] : Vincenzo feels well.  He has well-controlled hypertension.  He had the observation of being bradycardic in the setting of the use of midodrine and an abdominal process, felt likely to be from the medication and elevated vagal tone.  This has resolved, and he feels well.\par \par His exercise capacity is intact, and exceeds 5 metabolic equivalents.  He is planned for surgery next week, and will likely have a laparoscopic cholecystectomy.  I consider him optimized from a cardiovascular perspective for the planned surgery.  Routine hemodynamic monitoring is recommended.\par \par I suggested follow-up 6 weeks postoperatively, and I expect he will be seen annually after that.

## 2022-07-05 NOTE — PHYSICAL EXAM
[No Acute Distress] : no acute distress [Well Nourished] : well nourished [Well Developed] : well developed [Well-Appearing] : well-appearing [Normal Sclera/Conjunctiva] : normal sclera/conjunctiva [PERRL] : pupils equal round and reactive to light [EOMI] : extraocular movements intact [Normal Outer Ear/Nose] : the outer ears and nose were normal in appearance [Normal Oropharynx] : the oropharynx was normal [No JVD] : no jugular venous distention [No Lymphadenopathy] : no lymphadenopathy [Supple] : supple [Thyroid Normal, No Nodules] : the thyroid was normal and there were no nodules present [No Respiratory Distress] : no respiratory distress  [No Accessory Muscle Use] : no accessory muscle use [Clear to Auscultation] : lungs were clear to auscultation bilaterally [Normal Rate] : normal rate  [Regular Rhythm] : with a regular rhythm [Normal S1, S2] : normal S1 and S2 [No Murmur] : no murmur heard [No Carotid Bruits] : no carotid bruits [No Abdominal Bruit] : a ~M bruit was not heard ~T in the abdomen [No Varicosities] : no varicosities [Pedal Pulses Present] : the pedal pulses are present [No Edema] : there was no peripheral edema [No Palpable Aorta] : no palpable aorta [No Extremity Clubbing/Cyanosis] : no extremity clubbing/cyanosis [Soft] : abdomen soft [Non Tender] : non-tender [Non-distended] : non-distended [No Masses] : no abdominal mass palpated [No HSM] : no HSM [Normal Bowel Sounds] : normal bowel sounds [Normal Supraclavicular Nodes] : no supraclavicular lymphadenopathy [Normal Posterior Cervical Nodes] : no posterior cervical lymphadenopathy [Normal Anterior Cervical Nodes] : no anterior cervical lymphadenopathy [No CVA Tenderness] : no CVA  tenderness [No Spinal Tenderness] : no spinal tenderness [No Joint Swelling] : no joint swelling [Grossly Normal Strength/Tone] : grossly normal strength/tone [No Rash] : no rash [Coordination Grossly Intact] : coordination grossly intact [No Focal Deficits] : no focal deficits [Normal Gait] : normal gait [Deep Tendon Reflexes (DTR)] : deep tendon reflexes were 2+ and symmetric [Speech Grossly Normal] : speech grossly normal [Memory Grossly Normal] : memory grossly normal [Normal Affect] : the affect was normal [Alert and Oriented x3] : oriented to person, place, and time [Normal Mood] : the mood was normal [Normal Insight/Judgement] : insight and judgment were intact

## 2022-07-05 NOTE — ASSESSMENT
[Patient Optimized for Surgery] : Patient optimized for surgery [No Further Testing Recommended] : no further testing recommended [Continue medications as is] : Continue current medications [FreeTextEntry4] : cleared for Surgery

## 2022-07-05 NOTE — RESULTS/DATA
[] : results reviewed [ECG Reviewed] : reviewed [NSR] : normal sinus rhythm [NS ST-T Wave Changes] : there are nonspecific ST-T wave changes [de-identified] : normal [de-identified] : AST 44 ALT 55 Na 134

## 2022-07-05 NOTE — HISTORY OF PRESENT ILLNESS
[FreeTextEntry1] : Vincenzo presented to the office today for a cardiovascular evaluation.  He presents for the further evaluation of his risk factors.\par \par He is now 67 years old, with history of hypertension, hyperlipidemia and prediabetes.  He has modestly elevated triglycerides, and a low HDL.  He is not known to have any underlying structural heart disease.  He was admitted to the hospital about 3 weeks ago with gram-negative sepsis due to biliary cholangitis.  He had ERCP and stenting, with partial stone removal.  Although there were plans to perform a cholecystectomy, this was deferred because of the observation that he was quite bradycardic.  We felt that the bradycardia was on the basis of heightened vagal tone in the setting of his abdominal process as well as the use of midodrine in the hospital.  They were plans to reschedule the surgery in several weeks, and have him see me in the office prior to the surgery for a cardiovascular evaluation.\par \par He has been feeling well from a cardiovascular perspective since being discharged from the hospital.  She does not report symptoms suggestive of angina, congestive heart failure or arrhythmia.   His heart rate has been well controlled, and normal.  He is planned for surgery next week.

## 2022-07-08 ENCOUNTER — OUTPATIENT (OUTPATIENT)
Dept: OUTPATIENT SERVICES | Facility: HOSPITAL | Age: 67
LOS: 1 days | End: 2022-07-08
Payer: MEDICARE

## 2022-07-08 DIAGNOSIS — Z98.890 OTHER SPECIFIED POSTPROCEDURAL STATES: Chronic | ICD-10-CM

## 2022-07-08 DIAGNOSIS — Z20.828 CONTACT WITH AND (SUSPECTED) EXPOSURE TO OTHER VIRAL COMMUNICABLE DISEASES: ICD-10-CM

## 2022-07-08 LAB — SARS-COV-2 RNA SPEC QL NAA+PROBE: SIGNIFICANT CHANGE UP

## 2022-07-08 PROCEDURE — U0005: CPT

## 2022-07-08 PROCEDURE — U0003: CPT

## 2022-07-10 ENCOUNTER — TRANSCRIPTION ENCOUNTER (OUTPATIENT)
Age: 67
End: 2022-07-10

## 2022-07-11 ENCOUNTER — OUTPATIENT (OUTPATIENT)
Dept: OUTPATIENT SERVICES | Facility: HOSPITAL | Age: 67
LOS: 1 days | End: 2022-07-11
Payer: MEDICARE

## 2022-07-11 ENCOUNTER — RESULT REVIEW (OUTPATIENT)
Age: 67
End: 2022-07-11

## 2022-07-11 ENCOUNTER — APPOINTMENT (OUTPATIENT)
Dept: SURGERY | Facility: HOSPITAL | Age: 67
End: 2022-07-11

## 2022-07-11 ENCOUNTER — TRANSCRIPTION ENCOUNTER (OUTPATIENT)
Age: 67
End: 2022-07-11

## 2022-07-11 VITALS
SYSTOLIC BLOOD PRESSURE: 120 MMHG | WEIGHT: 143.08 LBS | TEMPERATURE: 98 F | OXYGEN SATURATION: 100 % | RESPIRATION RATE: 14 BRPM | DIASTOLIC BLOOD PRESSURE: 79 MMHG | HEART RATE: 64 BPM | HEIGHT: 67 IN

## 2022-07-11 VITALS
DIASTOLIC BLOOD PRESSURE: 77 MMHG | OXYGEN SATURATION: 100 % | HEART RATE: 79 BPM | SYSTOLIC BLOOD PRESSURE: 119 MMHG | RESPIRATION RATE: 14 BRPM

## 2022-07-11 DIAGNOSIS — Z98.890 OTHER SPECIFIED POSTPROCEDURAL STATES: Chronic | ICD-10-CM

## 2022-07-11 DIAGNOSIS — Z01.818 ENCOUNTER FOR OTHER PREPROCEDURAL EXAMINATION: ICD-10-CM

## 2022-07-11 DIAGNOSIS — K83.09 OTHER CHOLANGITIS: ICD-10-CM

## 2022-07-11 PROCEDURE — 88304 TISSUE EXAM BY PATHOLOGIST: CPT | Mod: 26

## 2022-07-11 PROCEDURE — 47562 LAPAROSCOPIC CHOLECYSTECTOMY: CPT

## 2022-07-11 PROCEDURE — 47562 LAPAROSCOPIC CHOLECYSTECTOMY: CPT | Mod: 80

## 2022-07-11 PROCEDURE — 88304 TISSUE EXAM BY PATHOLOGIST: CPT

## 2022-07-11 PROCEDURE — 47562 LAPAROSCOPIC CHOLECYSTECTOMY: CPT | Mod: 22

## 2022-07-11 PROCEDURE — C1889: CPT

## 2022-07-11 DEVICE — CLIP APPLIER COVIDIEN ENDOCLIP II 10MM MED/LG: Type: IMPLANTABLE DEVICE | Status: FUNCTIONAL

## 2022-07-11 DEVICE — CLIP APPLIER COVIDIEN ENDOCLIP 10MM LARGE: Type: IMPLANTABLE DEVICE | Status: FUNCTIONAL

## 2022-07-11 RX ORDER — ONDANSETRON 8 MG/1
4 TABLET, FILM COATED ORAL ONCE
Refills: 0 | Status: DISCONTINUED | OUTPATIENT
Start: 2022-07-11 | End: 2022-07-11

## 2022-07-11 RX ORDER — KETOROLAC TROMETHAMINE 30 MG/ML
30 SYRINGE (ML) INJECTION ONCE
Refills: 0 | Status: DISCONTINUED | OUTPATIENT
Start: 2022-07-11 | End: 2022-07-11

## 2022-07-11 RX ORDER — HYDROMORPHONE HYDROCHLORIDE 2 MG/ML
0.5 INJECTION INTRAMUSCULAR; INTRAVENOUS; SUBCUTANEOUS
Refills: 0 | Status: DISCONTINUED | OUTPATIENT
Start: 2022-07-11 | End: 2022-07-11

## 2022-07-11 RX ORDER — SODIUM CHLORIDE 9 MG/ML
1000 INJECTION, SOLUTION INTRAVENOUS
Refills: 0 | Status: DISCONTINUED | OUTPATIENT
Start: 2022-07-11 | End: 2022-07-11

## 2022-07-11 RX ORDER — CEFAZOLIN SODIUM 1 G
2000 VIAL (EA) INJECTION ONCE
Refills: 0 | Status: COMPLETED | OUTPATIENT
Start: 2022-07-11 | End: 2022-07-11

## 2022-07-11 RX ORDER — ACETAMINOPHEN 500 MG
1000 TABLET ORAL ONCE
Refills: 0 | Status: COMPLETED | OUTPATIENT
Start: 2022-07-11 | End: 2022-07-11

## 2022-07-11 RX ORDER — IBUPROFEN 200 MG
3 TABLET ORAL
Qty: 144 | Refills: 0
Start: 2022-07-11 | End: 2022-07-22

## 2022-07-11 RX ORDER — ACETAMINOPHEN 500 MG
2 TABLET ORAL
Qty: 72 | Refills: 0
Start: 2022-07-11 | End: 2022-07-22

## 2022-07-11 RX ADMIN — SODIUM CHLORIDE 75 MILLILITER(S): 9 INJECTION, SOLUTION INTRAVENOUS at 10:00

## 2022-07-11 RX ADMIN — HYDROMORPHONE HYDROCHLORIDE 0.5 MILLIGRAM(S): 2 INJECTION INTRAMUSCULAR; INTRAVENOUS; SUBCUTANEOUS at 10:08

## 2022-07-11 RX ADMIN — SODIUM CHLORIDE 75 MILLILITER(S): 9 INJECTION, SOLUTION INTRAVENOUS at 06:41

## 2022-07-11 RX ADMIN — Medication 1000 MILLIGRAM(S): at 10:34

## 2022-07-11 RX ADMIN — Medication 400 MILLIGRAM(S): at 10:09

## 2022-07-11 RX ADMIN — Medication 30 MILLIGRAM(S): at 12:43

## 2022-07-11 RX ADMIN — HYDROMORPHONE HYDROCHLORIDE 0.5 MILLIGRAM(S): 2 INJECTION INTRAMUSCULAR; INTRAVENOUS; SUBCUTANEOUS at 09:57

## 2022-07-11 NOTE — ASU DISCHARGE PLAN (ADULT/PEDIATRIC) - NS MD DC FALL RISK RISK
For information on Fall & Injury Prevention, visit: [email or web address] OR  [email or web address] OR  https://www.cdc.gov/steadi/patient.html

## 2022-07-11 NOTE — ASU DISCHARGE PLAN (ADULT/PEDIATRIC) - CARE PROVIDER_API CALL
Carlos Pina)  Surgery  [address] [place] [address]  Phone: [phone]  Fax: [phone]  Established Patient  Follow Up Time: 2 weeks

## 2022-07-11 NOTE — ASU DISCHARGE PLAN (ADULT/PEDIATRIC) - ASU DC SPECIAL INSTRUCTIONSFT
/ no heavy lifting, pushing or pulling more than 10 lbs for 6 weeks  / can shower 24 hours after surgery   / no bath tubs, swimming, submerging in water for 6 weeks   / pain meds as prescribed   / no driving within 24 hours or when taking narcotics   / follow up in clinic within 2 weeks

## 2022-07-26 ENCOUNTER — TRANSCRIPTION ENCOUNTER (OUTPATIENT)
Age: 67
End: 2022-07-26

## 2022-07-26 ENCOUNTER — OUTPATIENT (OUTPATIENT)
Dept: OUTPATIENT SERVICES | Facility: HOSPITAL | Age: 67
LOS: 1 days | End: 2022-07-26
Payer: MEDICARE

## 2022-07-26 VITALS
WEIGHT: 143.08 LBS | SYSTOLIC BLOOD PRESSURE: 118 MMHG | HEART RATE: 69 BPM | TEMPERATURE: 98 F | RESPIRATION RATE: 16 BRPM | OXYGEN SATURATION: 100 % | DIASTOLIC BLOOD PRESSURE: 92 MMHG | HEIGHT: 67 IN

## 2022-07-26 VITALS
RESPIRATION RATE: 12 BRPM | HEART RATE: 60 BPM | OXYGEN SATURATION: 100 % | SYSTOLIC BLOOD PRESSURE: 135 MMHG | DIASTOLIC BLOOD PRESSURE: 89 MMHG

## 2022-07-26 DIAGNOSIS — Z98.890 OTHER SPECIFIED POSTPROCEDURAL STATES: Chronic | ICD-10-CM

## 2022-07-26 DIAGNOSIS — R10.84 GENERALIZED ABDOMINAL PAIN: ICD-10-CM

## 2022-07-26 DIAGNOSIS — K80.50 CALCULUS OF BILE DUCT WITHOUT CHOLANGITIS OR CHOLECYSTITIS WITHOUT OBSTRUCTION: ICD-10-CM

## 2022-07-26 PROCEDURE — C1748: CPT

## 2022-07-26 PROCEDURE — 43275 ERCP REMOVE FORGN BODY DUCT: CPT

## 2022-07-26 PROCEDURE — 43264 ERCP REMOVE DUCT CALCULI: CPT

## 2022-07-26 PROCEDURE — 74330 X-RAY BILE/PANC ENDOSCOPY: CPT

## 2022-07-26 PROCEDURE — C1726: CPT

## 2022-07-26 PROCEDURE — C1769: CPT

## 2022-07-26 DEVICE — HYDRATOME 44: Type: IMPLANTABLE DEVICE | Status: FUNCTIONAL

## 2022-07-26 DEVICE — CATH BLLN EXTRACT DIST GUIDE WIRE 15MM 3LUM: Type: IMPLANTABLE DEVICE | Status: FUNCTIONAL

## 2022-07-26 DEVICE — AUTOTOME CANNULATING SPHINCTEROTOME RX 44 20MM: Type: IMPLANTABLE DEVICE | Status: FUNCTIONAL

## 2022-07-26 DEVICE — DUODENOSCOPE EXALT MODEL D SINGLE USE: Type: IMPLANTABLE DEVICE | Status: FUNCTIONAL

## 2022-07-26 DEVICE — BLLN EXTRACT FUSION QUATRO 8.5 10 12 15MM: Type: IMPLANTABLE DEVICE | Status: FUNCTIONAL

## 2022-07-26 DEVICE — CATH KT BLN DIL RX OLB 8MMX4CM: Type: IMPLANTABLE DEVICE | Status: FUNCTIONAL

## 2022-07-26 RX ORDER — INDOMETHACIN 50 MG
100 CAPSULE ORAL ONCE
Refills: 0 | Status: DISCONTINUED | OUTPATIENT
Start: 2022-07-26 | End: 2022-08-09

## 2022-07-26 RX ORDER — TELMISARTAN 20 MG/1
1 TABLET ORAL
Qty: 0 | Refills: 0 | DISCHARGE

## 2022-07-26 RX ORDER — CIPROFLOXACIN LACTATE 400MG/40ML
400 VIAL (ML) INTRAVENOUS ONCE
Refills: 0 | Status: DISCONTINUED | OUTPATIENT
Start: 2022-07-26 | End: 2022-08-09

## 2022-07-26 NOTE — ASU PATIENT PROFILE, ADULT - FALL HARM RISK - UNIVERSAL INTERVENTIONS
Bed in lowest position, wheels locked, appropriate side rails in place/Call bell, personal items and telephone in reach/Instruct patient to call for assistance before getting out of bed or chair/Non-slip footwear when patient is out of bed/[place] to call system/Physically safe environment - no spills, clutter or unnecessary equipment/Purposeful Proactive Rounding/Room/bathroom lighting operational, light cord in reach

## 2022-07-26 NOTE — ASU DISCHARGE PLAN (ADULT/PEDIATRIC) - NS MD DC FALL RISK RISK
For information on Fall & Injury Prevention, visit: [email or web address] OR  [email or web address] OR  https://www.cdc.gov/steadi/patient.html

## 2022-07-26 NOTE — PRE PROCEDURE NOTE - PRE PROCEDURE EVALUATION
Attending Physician:                            Procedure: ercp    Indication for Procedure: stent/stone removal  ________________________________________________________  PAST MEDICAL & SURGICAL HISTORY:  HTN (hypertension)      S/P ERCP        ALLERGIES:  No Known Allergies    HOME MEDICATIONS:  telmisartan 20 mg oral tablet: 1 tab(s) orally once a day    AICD/PPM: [ ] yes   [ x] no    PERTINENT LAB DATA:                      PHYSICAL EXAMINATION:    Height (cm): 170.2  Weight (kg): 64.864  BMI (kg/m2): 22.4  BSA (m2): 1.75T(C): 36.7  HR: 69  BP: 118/92  RR: 16  SpO2: 100%    Constitutional: NAD  HEENT: PERRLA, EOMI,    Neck:  No JVD  Respiratory: CTAB/L  Cardiovascular: S1 and S2  Gastrointestinal: BS+, soft, NT/ND  Extremities: No peripheral edema  Neurological: A/O x 3, no focal deficits  Psychiatric: Normal mood, normal affect  Skin: No rashes    ASA Class: I [ ]  II [ ]  III [x ]  IV [ ]    COMMENTS:    The patient is a suitable candidate for the planned procedure unless box checked [ ]  No, explain:

## 2022-07-26 NOTE — ASU PATIENT PROFILE, ADULT - PATIENT'S HEIGHT AND WEIGHT RECORDED IN THE VITAL SIGNS FLOWSHEET
Hospitalist Progress Note    Patient:  Annell Fothergill      Unit/Bed:6K-27/027-A    YOB: 1968    MRN: [id number]       Acct: [de-identified]     PCP: James Man DO    Date of Admission: 6/2/2022    Assessment/Plan:    1. Severe Hyponatremia: Due to chronic alcohol abuse and poor PO intake. Na 115 on admit. Correction Na goal 6-8 mEq/24hrs. Tolerating diet and good PO intake. Continue diet with 2g Na and 1.5L fluid restrictions. Fall and seizure precautions. PT/OT. 2. Metabolic Encephalopathy, improving: Due to dehydration/hyponatremia/alcohol abuse. Plan as above. 3. Acute on Chronic Anemia: Iron, B12, and Folate are all normal on 6/3/22. Hgb was 6.5 on 6/4, repeat 7.0. Patient refused blood transfusions. Although iron levels are normal, given IV Venofer x1. Repeat Hgb 8.2. No evidence of bleeding at this time. Hgb 7 on 6/6 morning, will repeat H/H at 8am. Monitor CBC. 4. Physical Deconditioning: Due to history of chronic alcohol abuse, malnutrition, and multiple sclerosis. Has bilateral lower extremity weakness. PT/OT recommends continued physical therapy and ECF. DME order placed for bedside commode and tub transfer bench due to deconditioning. 5. Bacteruria: Noted on U/A on 6/2. Patient is asymptomatic. Urine culture reveals E. Coli and Enterobacter cloacae complex. On Rocephin x3 days. 6. Polysubstance Abuse: Smokes marijuana, cigarettes, and chronic alcohol abuse. Patient does not want to quit at this time. Discussed cessation and provided resources. Addiction Services consulted. Continue Phenobarbital, Folate, Thiamine, and Multivitamin. 7. Moderate Protein Calorie Malnutrition, POA: BMI 17.40. Appears thin and malnourished with poor intake. Dietician following. 8. Esophagitis: Noted on CT. Continue Protonix. 9.  HAGMA, resolved    Expected discharge date:  TBD    Disposition:    [] Home       [] TCU       [] Rehab       [] Psych       [] SNF       [] [facility] [] Other-    Chief Complaint: [address] Course:   Per prior HPI, \"Patient presented to [facility] ED on 6/2/2022 for evaluation of loss of consciousness. Donell Valdovinos reported she was at home alone, sitting down watching TV on the couch, when she experienced a episode of loss of consciousness.  In the ED, she was lethargic but answering questions appropriately.  She denied fever or chest pain.  She did endorse tremors, that started today. Marlo Stein did report recent nausea and vomiting and diarrhea for the past few days. Marlo Stein denied abdominal pain.  She endorsed significant alcohol use chronically, but could not accurately quantify typical intake.  She did endorse drinking 4 beers this morning, but none since then.  Patient was significantly tachycardic on arrival, with heart rate as high as 160s.  This was confirmed sinus tachycardia on EKG.  She was afebrile.  She was hypertensive.  Admission labs were significant for sodium 115, chloride 71, CO2 14, anion gap 30.0, lactic acid 15.1, troponin <0.010, serum EtOH <0.01, urine drug screen positive for cannabinoids, WBC 13.7, hemoglobin 8.0.  CT head was done in the ED, showing no acute intracranial process.  CT cervical spine was also negative.  CT abdomen/pelvis showed new esophageal wall thickening and edema, concerning for esophagitis. Patient was administered ativan and thiamine for alcohol withdrawal. She was started on 0.9% NS at 50 mL/h. She was admitted to ICU for further evaluation and care. \"     She was transferred to stepdown on 6/4/22. She did try to leave AMA, however, she was too weak to ambulate on her own and family will not pick her up. Her Hgb was noted to be 6.5 on 6/4, however, patient refused blood transfusions. She was given IV Venofer x1. Hgb has improved to 8.2. Subjective (past 24 hours): No significant events overnight. Patient states she is doing fine.  She refused labs this morning, agreed to get them drawn after discussion. She wants to go home, but her parents do not want her to come home. ROS (12 point review of systems completed. Pertinent positives noted. Otherwise ROS is negative). Medications:  Reviewed    Infusion Medications    dextrose      [Held by provider] sodium chloride 75 mL/hr at 06/05/22 1925     Scheduled Medications    pantoprazole  40 mg Oral QAM AC    folic acid  1 mg Oral Daily    thiamine  100 mg Oral Daily    multivitamin  1 tablet Oral Daily     PRN Meds: PHENobarbital **OR** PHENobarbital **OR** PHENobarbital IVPB, potassium chloride **OR** potassium chloride, magnesium sulfate, sodium phosphate IVPB **OR** sodium phosphate IVPB **OR** sodium phosphate IVPB, glucose, dextrose bolus **OR** dextrose bolus, glucagon (rDNA), dextrose      Intake/Output Summary (Last 24 hours) at 6/7/2022 1330  Last data filed at 6/7/2022 0813  Gross per 24 hour   Intake 240 ml   Output 275 ml   Net -35 ml     Diet:  ADULT DIET; Regular; Low Sodium (2 gm); 1500 ml  ADULT ORAL NUTRITION SUPPLEMENT; Breakfast, Lunch, Dinner; Frozen Oral Supplement    Exam:  /66   Pulse 91   Temp 98.3 °F (36.8 °C) (Oral)   Resp 16   Ht 5' 4\" (1.626 m)   Wt 104 lb 3 oz (47.3 kg)   LMP 08/14/2012   SpO2 100%   BMI 17.88 kg/m²     General appearance: Thin, malnourished appearing female. HEENT: Pupils equal, round, and reactive to light. Conjunctivae/corneas clear. Neck: Supple, with full range of motion. No jugular venous distention. Trachea midline. Respiratory:  Normal respiratory effort. Clear to auscultation, bilaterally without Rales/Wheezes/Rhonchi. Cardiovascular: Regular rate and rhythm with normal S1/S2 without murmurs, rubs or gallops. Abdomen: Soft, non-tender, non-distended with normal bowel sounds. Musculoskeletal: passive and active ROM x 4 extremities, decreased strength in BLE. Skin: Skin color, texture, turgor normal.  No rashes or lesions.   Neurologic:  Neurovascularly intact without any focal sensory/motor deficits. Cranial nerves: II-XII intact, grossly non-focal.  Psychiatric: Alert and oriented, thought content appropriate, poor insight  Capillary Refill: Brisk,< 3 seconds   Peripheral Pulses: +2 palpable, equal bilaterally     Labs:   Recent Labs     06/05/22  0149 06/05/22  0149 06/06/22  0434 06/06/22  0937 06/07/22  1010   WBC 7.6  --  6.0  --  4.5*   HGB 8.2*   < > 7.0* 7.9* 7.3*   HCT 29.2*   < > 25.1* 25.9* 26.1*     --  245  --  277    < > = values in this interval not displayed. Recent Labs     06/05/22 0149 06/05/22  1121 06/06/22  0434 06/06/22  0434 06/06/22  1152 06/06/22  1346 06/07/22  1010   *  124*   < > 130*   < > 130* 127* 132*   K 3.3*   < > 3.1*  --   --  4.1 3.9   CL 89*  --  99  --   --   --  97*   CO2 19*  --  23  --   --   --  25   BUN <2*  --  <2*  --   --   --  <2*   CREATININE 0.3*  --  0.3*  --   --   --  0.3*   CALCIUM 7.5*  --  7.7*  --   --   --  8.4*    < > = values in this interval not displayed. Recent Labs     06/05/22 0149 06/06/22  0434 06/07/22  1010   * 76* 83*   ALT 55 48 50   BILIDIR 0.3 <0.2 <0.2   BILITOT 0.5 0.3 0.3   ALKPHOS 94 95 110     No results for input(s): INR in the last 72 hours. No results for input(s): Milderd Foster in the last 72 hours. Microbiology:      Urinalysis:      Lab Results   Component Value Date    NITRU NEGATIVE 06/02/2022    WBCUA 0-2 06/02/2022    WBCUA None 05/14/2022    BACTERIA MANY 06/02/2022    RBCUA 3-5 06/02/2022    BLOODU TRACE 06/02/2022    SPECGRAV 1.017 06/16/2021    GLUCOSEU NEGATIVE 06/02/2022     Radiology:  CT HEAD WO CONTRAST   Final Result       1. Mild global volume loss. 2. No acute findings. **This report has been created using voice recognition software. It may contain minor errors which are inherent in voice recognition technology. **      Final report electronically signed by Dr. Laurence Alamo on 6/6/2022 2:20 PM      CT Cervical Spine WO Contrast Final Result   Impression:      No acute processes on this limited study with motion. Consider repeat study if there is persistent concern. This document has been electronically signed by: Amada Guthrie MD on    06/03/2022 02:55 AM      All CTs at this facility use dose modulation techniques and iterative    reconstructions, and/or weight-based dosing   when appropriate to reduce radiation to a low as reasonably achievable. CT Head WO Contrast   Final Result   Impression:      No acute intracranial process      This document has been electronically signed by: Amada Guthrie MD on    06/03/2022 03:02 AM      All CTs at this facility use dose modulation techniques and iterative    reconstructions, and/or weight-based dosing   when appropriate to reduce radiation to a low as reasonably achievable. CT ABDOMEN PELVIS W IV CONTRAST Additional Contrast? None   Final Result   Impression:      New esophageal wall thickening and edema, question esophagitis. New duodenal and proximal jejunal wall thickening and dilation, question    duodenitis and/or enteritis. Additional findings discussed above      This document has been electronically signed by: Amada Guthrie MD on    06/03/2022 03:16 AM      All CTs at this facility use dose modulation techniques and iterative    reconstructions, and/or weight-based dosing   when appropriate to reduce radiation to a low as reasonably achievable. XR CHEST PORTABLE   Final Result   Impression:      No acute processes.       This document has been electronically signed by: Amada Guthrie MD on    06/03/2022 12:56 AM        DVT prophylaxis: [] Lovenox                                 [x] SCDs                                 [] SQ Heparin                                 [] Encourage ambulation           [] Already on Anticoagulation     Code Status: Full Code    PT/OT Eval Status: Yes    Tele:   [x] yes             [] no    Electronically signed by Cheri Corado DO on 6/7/2022 at 1:30 PM yes

## 2022-08-30 ENCOUNTER — APPOINTMENT (OUTPATIENT)
Dept: OTOLARYNGOLOGY | Facility: CLINIC | Age: 67
End: 2022-08-30
Payer: MEDICARE

## 2022-08-30 VITALS — DIASTOLIC BLOOD PRESSURE: 76 MMHG | SYSTOLIC BLOOD PRESSURE: 115 MMHG | HEART RATE: 72 BPM

## 2022-08-30 VITALS
WEIGHT: 153 LBS | HEART RATE: 76 BPM | SYSTOLIC BLOOD PRESSURE: 138 MMHG | BODY MASS INDEX: 24.01 KG/M2 | HEIGHT: 67 IN | DIASTOLIC BLOOD PRESSURE: 69 MMHG

## 2022-08-30 DIAGNOSIS — J34.2 DEVIATED NASAL SEPTUM: ICD-10-CM

## 2022-08-30 DIAGNOSIS — H60.63 UNSPECIFIED CHRONIC OTITIS EXTERNA, BILATERAL: ICD-10-CM

## 2022-08-30 DIAGNOSIS — H90.3 SENSORINEURAL HEARING LOSS, BILATERAL: ICD-10-CM

## 2022-08-30 PROCEDURE — 92557 COMPREHENSIVE HEARING TEST: CPT

## 2022-08-30 PROCEDURE — 92550 TYMPANOMETRY & REFLEX THRESH: CPT

## 2022-08-30 PROCEDURE — 99203 OFFICE O/P NEW LOW 30 MIN: CPT

## 2022-08-30 NOTE — HISTORY OF PRESENT ILLNESS
[de-identified] : Patient presents stating that he realized when he was primary school that he wasn’t hearing from his right ear. He states back then he was told it might be nerve damage but never told him what caused it. No MRI was ever done. He presents  to see if this is reservable or not. Patient denies family history of hearing loss. Pt. denies ringing in the ears.

## 2022-08-30 NOTE — CONSULT LETTER
[Dear  ___] : Dear  [unfilled], [Courtesy Letter:] : I had the pleasure of seeing your patient, [unfilled], in my office today. [Please see my note below.] : Please see my note below. [Consult Closing:] : Thank you very much for allowing me to participate in the care of this patient.  If you have any questions, please do not hesitate to contact me. [Sincerely,] : Sincerely, [FreeTextEntry1] : Tu Padilla MD FACS

## 2022-08-30 NOTE — ASSESSMENT
[FreeTextEntry1] : Reviewed and reconciled medications, allergies, PMHx, PSHx, SocHx, FMHx \par \par Pt. presents to see if there is anything that could be done for his right ear which he states he has not been able to hear out of since primary school and was told it was due to nerve damage. 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 doing pretty good in the left ear\par Tymps: Type As AU\par Audio: AD- Profound SNHL 250-8000 Hz, AS- -8000 Hz\par \par Plan: cerumen removed. Audio - results interpreted by Dr. Padilla and reviewed with the patient. R/B/A of Cochlear Implant discussed. Or cross -type hearing aid was discussed. Can do Afrin before take off when traveling in [place]. FU 2-3 years. \par

## 2022-08-30 NOTE — PHYSICAL EXAM
[Ribera Test Lateralizes To Left] : tone lateralization to the left [] : septum deviated bilaterally [Midline] : trachea located in midline position [Normal] : no masses and lesions seen, face is symmetric [FreeTextEntry8] : minimal cerumen removed with curettage [FreeTextEntry9] : minimal cerumen removed with curettage [de-identified] : class 2 [FreeTextEntry2] : sensations intact. sinuses nontender to percussion

## 2022-08-30 NOTE — DATA REVIEWED
[de-identified] : Tymps: Type As AU\par Audio: AD- Profound SNHL 250-8000 Hz, AS- -8000 Hz\par Recs: 1. ENT f/u, 2. Re-eval as per MD

## 2022-08-31 ENCOUNTER — APPOINTMENT (OUTPATIENT)
Dept: INTERNAL MEDICINE | Facility: CLINIC | Age: 67
End: 2022-08-31

## 2022-08-31 VITALS
BODY MASS INDEX: 24.01 KG/M2 | TEMPERATURE: 97.9 F | OXYGEN SATURATION: 98 % | HEIGHT: 67 IN | DIASTOLIC BLOOD PRESSURE: 68 MMHG | WEIGHT: 153 LBS | HEART RATE: 71 BPM | RESPIRATION RATE: 16 BRPM | SYSTOLIC BLOOD PRESSURE: 116 MMHG

## 2022-08-31 DIAGNOSIS — I83.93 ASYMPTOMATIC VARICOSE VEINS OF BILATERAL LOWER EXTREMITIES: ICD-10-CM

## 2022-08-31 PROCEDURE — 99214 OFFICE O/P EST MOD 30 MIN: CPT

## 2022-08-31 NOTE — PHYSICAL EXAM
[No Acute Distress] : no acute distress [Well Nourished] : well nourished [Well Developed] : well developed [Well-Appearing] : well-appearing [Normal Voice/Communication] : normal voice/communication [Normal Sclera/Conjunctiva] : normal sclera/conjunctiva [PERRL] : pupils equal round and reactive to light [EOMI] : extraocular movements intact [Normal Outer Ear/Nose] : the outer ears and nose were normal in appearance [Normal Oropharynx] : the oropharynx was normal [No JVD] : no jugular venous distention [No Lymphadenopathy] : no lymphadenopathy [Supple] : supple [Thyroid Normal, No Nodules] : the thyroid was normal and there were no nodules present [No Respiratory Distress] : no respiratory distress  [No Accessory Muscle Use] : no accessory muscle use [Clear to Auscultation] : lungs were clear to auscultation bilaterally [Normal Rate] : normal rate  [Regular Rhythm] : with a regular rhythm [Normal S1, S2] : normal S1 and S2 [No Murmur] : no murmur heard [No Carotid Bruits] : no carotid bruits [No Abdominal Bruit] : a ~M bruit was not heard ~T in the abdomen [Pedal Pulses Present] : the pedal pulses are present [No Edema] : there was no peripheral edema [No Palpable Aorta] : no palpable aorta [No Extremity Clubbing/Cyanosis] : no extremity clubbing/cyanosis [Soft] : abdomen soft [Non Tender] : non-tender [Non-distended] : non-distended [No Masses] : no abdominal mass palpated [No HSM] : no HSM [Normal Bowel Sounds] : normal bowel sounds [Normal Supraclavicular Nodes] : no supraclavicular lymphadenopathy [Normal Posterior Cervical Nodes] : no posterior cervical lymphadenopathy [Normal Anterior Cervical Nodes] : no anterior cervical lymphadenopathy [No CVA Tenderness] : no CVA  tenderness [No Spinal Tenderness] : no spinal tenderness [No Joint Swelling] : no joint swelling [Grossly Normal Strength/Tone] : grossly normal strength/tone [No Rash] : no rash [Coordination Grossly Intact] : coordination grossly intact [No Focal Deficits] : no focal deficits [Normal Gait] : normal gait [Deep Tendon Reflexes (DTR)] : deep tendon reflexes were 2+ and symmetric [Speech Grossly Normal] : speech grossly normal [Memory Grossly Normal] : memory grossly normal [Normal Affect] : the affect was normal [Alert and Oriented x3] : oriented to person, place, and time [Normal Mood] : the mood was normal [Normal Insight/Judgement] : insight and judgment were intact [de-identified] : Varicose veins left worse than right

## 2022-08-31 NOTE — HISTORY OF PRESENT ILLNESS
[FreeTextEntry1] : follow up [de-identified] : DIONICIOLAWRENCE PACKER is a 67 year old M who presents today for HTN varicose veins \par no pains of legs

## 2022-08-31 NOTE — HEALTH RISK ASSESSMENT
[Never] : Never [No] : In the past 12 months have you used drugs other than those required for medical reasons? No [No falls in past year] : Patient reported no falls in the past year [0] : 2) Feeling down, depressed, or hopeless: Not at all (0) [PHQ-2 Negative - No further assessment needed] : PHQ-2 Negative - No further assessment needed [[id number]] : 0

## 2023-01-17 NOTE — PRE-OP CHECKLIST - BSA (M2)
1.75 Cibinqo Pregnancy And Lactation Text: It is unknown if this medication will adversely affect pregnancy or breast feeding.  You should not take this medication if you are currently pregnant or planning a pregnancy or while breastfeeding.

## 2023-05-19 ENCOUNTER — APPOINTMENT (OUTPATIENT)
Dept: INTERNAL MEDICINE | Facility: CLINIC | Age: 68
End: 2023-05-19
Payer: MEDICARE

## 2023-05-19 VITALS
OXYGEN SATURATION: 97 % | BODY MASS INDEX: 25.58 KG/M2 | SYSTOLIC BLOOD PRESSURE: 122 MMHG | WEIGHT: 163 LBS | HEART RATE: 82 BPM | HEIGHT: 67 IN | DIASTOLIC BLOOD PRESSURE: 74 MMHG | TEMPERATURE: 98.3 F | RESPIRATION RATE: 16 BRPM

## 2023-05-19 DIAGNOSIS — R73.03 PREDIABETES.: ICD-10-CM

## 2023-05-19 DIAGNOSIS — E55.9 VITAMIN D DEFICIENCY, UNSPECIFIED: ICD-10-CM

## 2023-05-19 PROCEDURE — 99214 OFFICE O/P EST MOD 30 MIN: CPT

## 2023-05-19 NOTE — HISTORY OF PRESENT ILLNESS
[FreeTextEntry1] : follow up  [de-identified] : DIONICIO PACKER is a 68 year old M who presents today for follow up for HTN, prediabetes, and Vitamin D deficiency. patient has no new complaints

## 2023-05-19 NOTE — HEALTH RISK ASSESSMENT
[Never (0 pts)] : Never (0 points) [No] : In the past 12 months have you used drugs other than those required for medical reasons? No [No falls in past year] : Patient reported no falls in the past year [0] : 2) Feeling down, depressed, or hopeless: Not at all (0) [PHQ-2 Negative - No further assessment needed] : PHQ-2 Negative - No further assessment needed [Never] : Never [[id number]] : 0

## 2023-05-19 NOTE — END OF VISIT
[FreeTextEntry3] : "I, José Miguel Garcia, personally scribed the services dictated to me by Dr. Luigi Candelario MD in this documentation on 05/19/2023 " \par \par "I Dr. Luigi Candelario MD, personally performed the services described in this documentation on 05/19/2023 for the patient as scribed by José Miguel Garcia in my presence. I have reviewed and verified that all the information is accurate and true."

## 2023-05-19 NOTE — PLAN
[FreeTextEntry1] : advised to lose weight \par further instructions pending lab results \par continue medications

## 2023-05-22 LAB
25(OH)D3 SERPL-MCNC: 15 NG/ML
ALBUMIN SERPL ELPH-MCNC: 4.4 G/DL
ALP BLD-CCNC: 86 U/L
ALT SERPL-CCNC: 17 U/L
ANION GAP SERPL CALC-SCNC: 10 MMOL/L
AST SERPL-CCNC: 19 U/L
BILIRUB SERPL-MCNC: 0.4 MG/DL
BUN SERPL-MCNC: 11 MG/DL
CALCIUM SERPL-MCNC: 9.2 MG/DL
CHLORIDE SERPL-SCNC: 99 MMOL/L
CO2 SERPL-SCNC: 27 MMOL/L
CREAT SERPL-MCNC: 0.74 MG/DL
EGFR: 100 ML/MIN/1.73M2
ESTIMATED AVERAGE GLUCOSE: 123 MG/DL
GLUCOSE SERPL-MCNC: 97 MG/DL
HBA1C MFR BLD HPLC: 5.9 %
POTASSIUM SERPL-SCNC: 4.6 MMOL/L
PROT SERPL-MCNC: 7.1 G/DL
SODIUM SERPL-SCNC: 135 MMOL/L

## 2023-07-02 ENCOUNTER — TRANSCRIPTION ENCOUNTER (OUTPATIENT)
Age: 68
End: 2023-07-02

## 2023-07-30 ENCOUNTER — RX RENEWAL (OUTPATIENT)
Age: 68
End: 2023-07-30

## 2023-09-01 ENCOUNTER — APPOINTMENT (OUTPATIENT)
Dept: INTERNAL MEDICINE | Facility: CLINIC | Age: 68
End: 2023-09-01
Payer: MEDICARE

## 2023-09-01 ENCOUNTER — NON-APPOINTMENT (OUTPATIENT)
Age: 68
End: 2023-09-01

## 2023-09-01 VITALS
DIASTOLIC BLOOD PRESSURE: 70 MMHG | RESPIRATION RATE: 16 BRPM | WEIGHT: 160 LBS | TEMPERATURE: 98.3 F | OXYGEN SATURATION: 98 % | SYSTOLIC BLOOD PRESSURE: 118 MMHG | HEART RATE: 75 BPM | BODY MASS INDEX: 25.11 KG/M2 | HEIGHT: 67 IN

## 2023-09-01 DIAGNOSIS — Z00.00 ENCOUNTER FOR GENERAL ADULT MEDICAL EXAMINATION W/OUT ABNORMAL FINDINGS: ICD-10-CM

## 2023-09-01 DIAGNOSIS — I10 ESSENTIAL (PRIMARY) HYPERTENSION: ICD-10-CM

## 2023-09-01 PROCEDURE — 93000 ELECTROCARDIOGRAM COMPLETE: CPT | Mod: 59

## 2023-09-01 PROCEDURE — G0438: CPT

## 2023-09-01 NOTE — HISTORY OF PRESENT ILLNESS
[FreeTextEntry1] : annual physical  [de-identified] : DIONICIO PACKER is a 68 year old M who presents today for annual physical. Pt has hx of HTN, Vitamin D deficiency and prediabetes. Pt has not had Colonoscopy  Patient has no new complaints.

## 2023-09-01 NOTE — END OF VISIT
[FreeTextEntry3] : "I, José Miguel Garcia, personally scribed the services dictated to me by Dr. Luigi Candelario MD in this documentation on 09/01/2023 "   "I Dr. Luigi Candelario MD, personally performed the services described in this documentation on 09/01/2023 for the patient as scribed by José Miguel Garcia in my presence. I have reviewed and verified that all the information is accurate and true."

## 2023-09-01 NOTE — HEALTH RISK ASSESSMENT
[Good] : ~his/her~  mood as  good [Never (0 pts)] : Never (0 points) [No] : In the past 12 months have you used drugs other than those required for medical reasons? No [No falls in past year] : Patient reported no falls in the past year [0] : 2) Feeling down, depressed, or hopeless: Not at all (0) [PHQ-2 Negative - No further assessment needed] : PHQ-2 Negative - No further assessment needed [None] : None [Alone] : lives alone [Retired] : retired [] :  [Feels Safe at Home] : Feels safe at home [Fully functional (bathing, dressing, toileting, transferring, walking, feeding)] : Fully functional (bathing, dressing, toileting, transferring, walking, feeding) [Fully functional (using the telephone, shopping, preparing meals, housekeeping, doing laundry, using] : Fully functional and needs no help or supervision to perform IADLs (using the telephone, shopping, preparing meals, housekeeping, doing laundry, using transportation, managing medications and managing finances) [Reports normal functional visual acuity (ie: able to read med bottle)] : Reports normal functional visual acuity [Carbon Monoxide Detector] : carbon monoxide detector [Safety elements used in home] : safety elements used in home [Seat Belt] :  uses seat belt [Sunscreen] : uses sunscreen [Never] : Never [[id number]] : 0 [Reports changes in hearing] : Reports no changes in hearing [Reports changes in vision] : Reports no changes in vision [Reports changes in dental health] : Reports no changes in dental health [Guns at Home] : no guns at home [Travel to Developing Areas] : does not  travel to developing areas [TB Exposure] : is not being exposed to tuberculosis [Caregiver Concerns] : does not have caregiver concerns

## 2023-09-01 NOTE — PLAN
[FreeTextEntry1] : continue medications for HTN Further instructions pending lab results  Advised to get Colonoscopy

## 2023-09-05 DIAGNOSIS — R31.29 OTHER MICROSCOPIC HEMATURIA: ICD-10-CM

## 2023-09-05 LAB
ALBUMIN SERPL ELPH-MCNC: 4.4 G/DL
ALP BLD-CCNC: 92 U/L
ALT SERPL-CCNC: 13 U/L
ANION GAP SERPL CALC-SCNC: 12 MMOL/L
APPEARANCE: CLEAR
AST SERPL-CCNC: 18 U/L
BACTERIA: NEGATIVE /HPF
BILIRUB SERPL-MCNC: 0.4 MG/DL
BILIRUBIN URINE: NEGATIVE
BLOOD URINE: ABNORMAL
BUN SERPL-MCNC: 12 MG/DL
CALCIUM SERPL-MCNC: 9.1 MG/DL
CAST: 0 /LPF
CHLORIDE SERPL-SCNC: 100 MMOL/L
CHOLEST SERPL-MCNC: 176 MG/DL
CK SERPL-CCNC: 124 U/L
CO2 SERPL-SCNC: 23 MMOL/L
COLOR: YELLOW
CREAT SERPL-MCNC: 0.69 MG/DL
EGFR: 102 ML/MIN/1.73M2
EPITHELIAL CELLS: 0 /HPF
ESTIMATED AVERAGE GLUCOSE: 126 MG/DL
GLUCOSE QUALITATIVE U: NEGATIVE MG/DL
GLUCOSE SERPL-MCNC: 98 MG/DL
HBA1C MFR BLD HPLC: 6 %
HDLC SERPL-MCNC: 48 MG/DL
HEMOCCULT STL QL IA: NEGATIVE
KETONES URINE: NEGATIVE MG/DL
LDLC SERPL CALC-MCNC: 108 MG/DL
LEUKOCYTE ESTERASE URINE: NEGATIVE
MICROSCOPIC-UA: NORMAL
NITRITE URINE: NEGATIVE
NONHDLC SERPL-MCNC: 128 MG/DL
PH URINE: 7
POTASSIUM SERPL-SCNC: 4.4 MMOL/L
PROT SERPL-MCNC: 7.1 G/DL
PROTEIN URINE: NEGATIVE MG/DL
PSA SERPL-MCNC: 0.65 NG/ML
RED BLOOD CELLS URINE: 9 /HPF
SODIUM SERPL-SCNC: 135 MMOL/L
SPECIFIC GRAVITY URINE: 1.01
TRIGL SERPL-MCNC: 108 MG/DL
TSH SERPL-ACNC: 1.27 UIU/ML
UROBILINOGEN URINE: 0.2 MG/DL
WHITE BLOOD CELLS URINE: 0 /HPF

## 2023-10-27 RX ORDER — TELMISARTAN 20 MG/1
20 TABLET ORAL
Qty: 90 | Refills: 0 | Status: ACTIVE | COMMUNITY
Start: 2023-07-30 | End: 1900-01-01

## 2023-11-30 NOTE — H&P PST ADULT - VENOUS THROMBOEMBOLISM SCORE
[facility]  Abelardo Pelletier  2014    Abelardo Pelletier is a 9 y.o. 4 m.o. male presenting for follow-up of PFO.  .     Subjective:     Abelardo is here today with his both parents. He comes in for evaluation of the following concerns:   1. ASD, spontaneous closure      HPI:   Abelardo is an ex-premature boy with autism spectrum disorder. He was noted to have a small atrial communication on echocardiogram in the NICU and was last seen by Cardiology at 2 months of life in clinic where echocardiogram performed showed a structurally and functionally normal heart with a small secundum ASD vs PFO. He presents for clearance for dental surgery. He has done very well since last being seen. He no chronic medical problems, he is growing well. He is developmentally delayed but is receiving therapies. He is currently receiving antibiotics for a upper respiratory illness. He is a very active kid and is able to keep up with peers. There are no reports of chest pain, chest pain with exertion, cyanosis, exercise intolerance, dyspnea, fatigue, feeding intolerance, palpitations, syncope, and tachypnea. No other cardiovascular or medical concerns are reported.     Medications:   Current Outpatient Medications on File Prior to Visit   Medication Sig    cefdinir (OMNICEF) 250 mg/5 mL suspension Take by mouth.    cetirizine (ZYRTEC) 1 mg/mL syrup Take 10 mLs (10 mg total) by mouth once daily.    prednisoLONE (PRELONE) 15 mg/5 mL syrup Take 15 mg by mouth.     No current facility-administered medications on file prior to visit.     Allergies: Review of patient's allergies indicates:  No Known Allergies  Immunization Status: stated as current, but no records available.     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     ROS:     Review of Systems   Constitutional: Negative.    Respiratory: Negative.     Cardiovascular: Negative.    Gastrointestinal: Negative.        Objective:   Vitals:    11/29/23 0856   BP: 117/70   BP Location: Left arm   Patient Position: Sitting   Pulse: 96   SpO2: 99%   Weight: 22 kg (48 lb 9.8 oz)   Height: 4' 1.21" (1.25 m)        Physical Exam  Constitutional:       General: He is active.   Cardiovascular:      Rate and Rhythm: Normal rate and regular rhythm.      Pulses: Normal pulses.      Heart sounds: Normal heart sounds. No murmur heard.     No friction rub. No gallop.   Pulmonary:      Effort: Pulmonary effort is normal. No respiratory distress.      Breath sounds: Normal breath sounds. No wheezing or rales.   Abdominal:      General: Abdomen is flat. Bowel sounds are normal. There is no distension.      Palpations: Abdomen is soft. There is no mass.   Skin:     General: Skin is warm.      Capillary Refill: Capillary refill takes less than 2 seconds.   Neurological:      Mental Status: He is alert.         Tests:     I evaluated the following studies:   EKG:  Normal for age    Echocardiogram:   Limited echo  Normal structures and function  No PFO  (Full report in electronic medical record)    Assessment:     1. ASD, spontaneous closure        Impression:     Abelardo Pelletier is an ex-premature 9 year old male with autism with prior ASD vs PFO which appears to have closed. He has a structurally and functionally normal heart and " no evidence of any intracardiac shunting. He is cleared from a cardiac standpoint for dental surgery. No antibiotic prophylaxis is needed. No further cardiology follow up required.    Plan:     Activity:  No restrictions    Medications:  None    Endocarditis prophylaxis is not recommended in this circumstance.     Follow-Up:     Follow-Up clinic visit in: none.      3

## 2023-12-06 NOTE — ASU PATIENT PROFILE, ADULT - PATIENT'S GENDER IDENTITY
Outpatient Psychiatry RV   12/06/23   See below guide for acronyms used   Total time spent in visit: 40 minutes  Services included under total time spent in visit include:   [x] Counseling and educating the patient/family/caregiver  [x] Obtaining and/or reviewing separately obtained history  [x] Ordering medications, tests, or procedures  [x] Documenting clinical information in the electronic or other health record  [] Preparing to see the patient (e.g., review of tests)  [] Independently interpreting results (not separately reported) and communicating results to the patient/family/caregiver  [] Communicating with other health care professionals (not separately reported)  [] Care coordination  [] Performing a medically appropriate examination and/or evaluation  This visit was performed via live interactive two-way video with patient's and parent/guardian's verbal consent.   Clinician Location:Home.  Patient Location: Home.   pt = patient, CBT = Cognitive Behavioral Therapy, DBT = Dialectical Behavior Therapy, OCD = Obsessive Compulsive Disorder, ASD = Autism Spectrum Disorder.  Source of history:  I based this report upon my review of Saima Maciel's  electronic medical record and my interview of Saima Maciel and parent(s)/guardian(s).      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     History of Present Illness/Presenting Problem:   At today's appointment, mom states she is \"seeing her energy is lower.\" Recall pt is taking Albanian 4, AP computer science, AP environmental science, [place] History, algebra 2, art, English, and study suarez. Really likes her art class and environmental science classes.  Pt states her mood has been \"good\", denying any interval depressed mood or anhedonia.      Long-term pt wants to study psychology at [employer].  Participates in  cross country and track (sprints and long jump). 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       Appetite: [x] Normal [] Increased [] Decreased. Denies having any body image concerns, stating she wants to gain some weight as she knows she lost about 10 pounds last year.   Sleep: always been a good sleeper    As of today's visit, patient denies experiencing any current or past: suicidal ideation, substance use.      Past Psych, family, developmental, and social histories per psychiatry intake assessment by Dr. Johnson. Updates since that assessment are recorded in bold.   Past Psychiatric History:   Therapist, Jeannette Chaney, at [facility] (as of 2021)  Working with psychiatrist, Dr. Virgen, from 11/2021 until transfer to my care 2/2023 for treatment of GLORY and panic disorder.   Underwent the following med trials:  3/2022 hydroxyzine 10mg 1-2 tabs TID PRN for anxiety.   Winter 2021-early spring 2022 sertraline titrated to 75mg.  2-3/2022 hydroxyzine utilized at this time PRN for anxiety.  Lexapro 5mg 2/2022-9/2022. Trial of 7.5mg briefly in March 2022.  7/2022 buspirone 2.5mg BID.  Summer and Fall 2022: Effexor 37.5mg daily (did not tolerate twice-daily dosing)  Family History:  Maternal GM: anxiety.  Maternal aunt: anxiety /depression situational.  Adopted paternal cousin: committed suicide (shot himself).  Maternal GGM: substance abuse.  Family History   Problem Relation Age of Onset    Asthma Mother     Allergic Rhinitis Mother     Hypertension Father         Bordeline HTN    Anxiety disorder Maternal Grandmother     Patient is unaware of any medical problems Maternal Grandfather         Healthy    Patient is unaware of any medical problems Paternal Grandmother     Heart Paternal Grandfather 64        Cardiac arrest- Death age 64    Diabetes Paternal Grandfather         DM    Atrial Fibrilliation Paternal Grandfather         Afib    Patient is unaware of any medical  problems Other         No family breast or ovarian CA    Cancer, Colon Maternal Great-Grandfather         Colon CA- Maternal great grandfather- DX in early 60's     Developmental History:   Per Dr. Virgen’s intake on 11/15/2021 and confirmed at this intake appointment: “It was emergency  she was transverse position after prolonged labor.At birth 7lbs 9 oz.  She was colicky at birth. Normal developmental milestones. Mother had postpartum depression and anxiety for which mother took sertraline for 3 years and it was beneficial. Never received any speech therapy or occupational therapy.”   Social History:  Lives with: mom, dad  Attends [employer]    Patient and parent/guardian state that the patient does not have a history of abuse, neglect, being bullied, or other trauma.  Patient and parent/guardian state that the patient does not have a history of legal issues.      Past Medical History:   Past Medical History:   Diagnosis Date    Anxiety 2020    Anxiety and depression 2020    Benign joint hypermobility 2020    With intermittent joint pain, no swelling    Chronic constipation 2018    COVID 2022    History of wheezing 10/13/2015    Melanocytic nevi of trunk 2020    Migraine with aura and without status migrainosus, not intractable 2020    Seasonal allergic rhinitis due to pollen 2020    Starting spring-     No past surgical history on file.    OBJECTIVE:  ALLERGIES:  ALLERGIES:  No Known Allergies    Medications:  Current Outpatient Medications   Medication Sig    clindamycin (CLINDAGEL) 1 % gel APPLY TOPICALLY TO FACE EVERY OTHER NIGHT FOR 1 WEEK. INCREASE TO EVERY NIGHT AS TOLERATED    hydrOXYzine (ATARAX) 10 MG tablet Take 1 tablet by mouth daily as needed for Anxiety.    venlafaxine XR (EFFEXOR XR) 37.5 MG 24 hr capsule Take 1 capsule by mouth daily. TAKE WITH FOOD.    Drospirenone 4 MG Tab Take one tablet by mouth daily.    escitalopram (LEXAPRO) 10  MG tablet Take 1 tablet by mouth daily.    Ferrous Sulfate (IRON PO)     tretinoin (RETIN-A) 0.025 % cream Apply to face qhs    Cholecalciferol 50 mcg (2,000 units) capsule Take 50 mcg by mouth daily.     No current facility-administered medications for this visit.       Mental Status Exam:  Posture: Normal.  Appearance:  Well-groomed, fair eye contact, appears stated age.   Hygiene: Within normal limits  Distress level: Mild  Behavior: calm, pleasant, interactive and cooperative   Motor: No tics, tremors, or psychomotor agitation/ retardation.  Speech:  Normal rate, tone, and volume.   Language:  No abnormality noted.    Mood: \"good\"  Affect:  euthymic, mood congruent  Thought Process:  Linear, logical, goal-directed.    Thought Content: No overt delusions or abnormality noted. Denied having a desire to harm self or others.  Perception: Does not appear to be responding to internal stimuli. No hallucinations. No intrusive recollections.  Consciousness: Awake and alert.   Orientation: Oriented to person, place and time.   Memory: Good, able to demonstrate accurate historical recall for recent and more remote events and discussions.  Attention:  good  Concentration: good  Fund of Knowledge:  Consistent with age and education.   Insight:  Fair for age  Judgment:  fair based on appropriate recent decisions.     Risk Assessment:  - Static Risk Factors:    [] Gender [x] Age [] H/o suicide attempt [] H/o suicidal ideation [] H/o trauma  - Modifiable Risk Factors: none  [] Depressed mood [] Impulsivity [] Current suicidal ideation [] Current substance use  - Protective Factors: parent/guardian denies that the patient has access to guns; patient is actively engaged in treatment; patient is future-oriented (planning to go to [employer] to study psychology) and hopeful  - Imminent Risk Factors:  none  - Overall Risk:     low    Was [facility] record reviewed on 12/06/23 ? [] Yes [x] No. If yes, then no suspicious activity  was noted.     Medical Decision Making and Treatment Plan:  Saima Maciel is a 18 year old who presented on 2/27/23 as a transfer from Dr. Virgen. Pt's previous depressive episode (December; seasonal component) is fully in remission, and anxiety is well-controlled, so will not make med changes. Extensively discussed treatment for PMDD (SSRI + OCP) and pt and parents will discuss whether they want PCP to prescribe. Recommended pt continue taking Vitamin D and switch to a multivitamin that has iron in it to address reversible causes of anxiety/depression. Briefly mentioned light therapy and will want to discuss prior to this next winter.   Suspect historic worsening of anxiety with increasing doses of SSRIs was triggered by activation as well as anxiety (pt shared that historically her anxiety would manifest as assuming the worst-case scenario would happen).   Briefly discussed with the pt that her poor intake during previous anxiety and depressive exacerbations likely impaired the effectiveness of prescribed medication.     Updated assessment (12/06/23):    depression and anxiety are in remission so will not make med changes.    ED Diagnosis   1. Major depressive disorder, recurrent episode, in full remission (CMD)        2. Generalized anxiety disorder        3. Panic disorder without agoraphobia             ^  Based on the available information provided during today's evaluation and based on chart review, the patient does not pose any imminent danger of harming self or others; therefore, the patient can continue to be followed as an outpatient. Parent(s)/Guardian(s) has author's contact information. They were informed that they should only direct non-emergent, non-urgent concerns to the author. Parent(s)/Guardian(s) agreed to go to the nearest emergency department (or have the patient safely brought by police) if they are concerned that the patient is a danger to self or others.    Problems addressed:  GLORY   status  of problem in bold:                    acute or chronic                   Life-threatening OR severe exacerbation, worsening OR side effect from medication, stable, or improving   Continue Lexapro at 10mg daily (increased on 6/9/23)  continue Effexor XR 37.5mg daily (discontinued IR version on 7/14/23)  mdd moderate in full remission  status of problem in bold:                    acute or chronic                   Life-threatening OR severe exacerbation, worsening OR side effect from medication, stable, or improving   lexapro and effexor as above   Continue Vitamin D 2,000 international units daily     Panic disorder without agoraphobia  status of problem in bold:                    acute or chronic                   Life-threatening OR severe exacerbation, worsening OR side effect from medication, stable, or improving   Continue hydroxyzine once daily prn for anxiety (uses 2 times monthly on average)  Continue in therapy    Reviewed and discussed the possible side effects, benefits, indications, and alternatives (including no treatment) of pharmacotherapy  with legal guardian.   Electronic consent for treatment and medication(s) sent on 02/27/23.        RYAN's on file for this patient: none    Follow-up in approximately 2 months      Yeny Johnson MD  12/06/23     The medical opinions expressed in this report are based only on information available at this time and are subject to change if any additional information becomes available.    Male

## 2023-12-13 NOTE — H&P ADULT - CLICK TO LAUNCH ORM
Problem: Discharge Planning  Goal: Discharge to home or other facility with appropriate resources  Outcome: Progressing     Problem: Pain  Goal: Verbalizes/displays adequate comfort level or baseline comfort level  Outcome: Progressing     Problem: Safety - Adult  Goal: Free from fall injury  Outcome: Progressing     Problem: ABCDS Injury Assessment  Goal: Absence of physical injury  Outcome: Progressing .

## 2024-01-17 NOTE — PRE-OP CHECKLIST - SITE MARKED BY SURGEON
LOV: 10/26/2023    RTC: 4 months   Future Appointments   Date Time Provider Department Center   4/25/2024  3:30 PM Estela Fuller MD [id number] [address]   LABS:   Component      Latest Ref Rng 10/5/2023   WBC      4.0 - 11.0 x10(3) uL 7.5    RBC      3.80 - 5.30 x10(6)uL 3.94    Hemoglobin      12.0 - 16.0 g/dL 11.5 (L)    Hematocrit      35.0 - 48.0 % 35.1    Platelet Count      150.0 - 450.0 10(3)uL 318.0    MCV      80.0 - 100.0 fL 89.1    MCH      26.0 - 34.0 pg 29.2    MCHC      31.0 - 37.0 g/dL 32.8    RDW      % 14.8    Prelim Neutrophil Abs      1.50 - 7.70 x10 (3) uL 4.28    Neutrophils Absolute      1.50 - 7.70 x10(3) uL 4.28    Lymphocytes Absolute      1.00 - 4.00 x10(3) uL 2.02    Monocytes Absolute      0.10 - 1.00 x10(3) uL 0.98    Eosinophils Absolute      0.00 - 0.70 x10(3) uL 0.14    Basophils Absolute      0.00 - 0.20 x10(3) uL 0.04    Immature Granulocyte Absolute      0.00 - 1.00 x10(3) uL 0.03    Neutrophils %      % 57.1    Lymphocytes %      % 27.0    Monocytes %      % 13.1    Eosinophils %      % 1.9    Basophils %      % 0.5    Immature Granulocyte %      % 0.4    Glucose      70 - 99 mg/dL 99    Sodium      136 - 145 mmol/L 136    Potassium      3.5 - 5.1 mmol/L 3.9    Chloride      98 - 112 mmol/L 103    Carbon Dioxide, Total      21.0 - 32.0 mmol/L 30.0    ANION GAP      0 - 18 mmol/L 3    BUN      7 - 18 mg/dL 17    CREATININE      0.55 - 1.02 mg/dL 1.04 (H)    CALCIUM      8.5 - 10.1 mg/dL 9.3    CALCULATED OSMOLALITY      275 - 295 mOsm/kg 284    EGFR      >=60 mL/min/1.73m2 60    AST (SGOT)      15 - 37 U/L 20    ALT (SGPT)      13 - 56 U/L 24    ALKALINE PHOSPHATASE      50 - 130 U/L 75    Total Bilirubin      0.1 - 2.0 mg/dL 0.4    PROTEIN, TOTAL      6.4 - 8.2 g/dL 6.6    Albumin      3.4 - 5.0 g/dL 3.2 (L)    Globulin      2.8 - 4.4 g/dL 3.4    A/G Ratio      1.0 - 2.0  0.9 (L)    Patient Fasting for CMP? Yes       Legend:  (L) Low  (H) High    LAST REFILL: 11/10/2023,  Quantity 12 tablets, Refills 0    Dr Fuller-- MCM sent to complete labs; orders pending, approve if agreeable.    n/a

## 2024-04-01 NOTE — CONSULT NOTE ADULT - CONSULT REASON
CC: Patient is a 89y old  Female who presents with a chief complaint of diarrhea, LOC (30 Mar 2024 21:05)    FROM HPI: 88 yo female with a PMH of a fib on xarelro, chf on torsemide, htn, GERD, DM, temporal arteritis on prednisone presents with diarrhea x 2 days. Pt was recently d/c from  for rapid a fib, and weakness on 3/25 and when she was d/c and placed on metformin. Pt states that after takign metformin she has having diarrhea TNTC which causes abd pain (which has currently resolved) and still feels weak. Per family since AF meds started/ changed she has low BP, LOC.  Admit for dehydration and to adjust  cardiac meds; HR 50s and bp95 systolic. Diarrhea has stopped since last night. (29 Mar 2024 07:52)    3/31. HR back to 130s in rapid AF, amiodarone increased yesterday. BB was held due to severe bradycardia. At this point will ask EP to evaluate for repeat DCCV on amiodarone  4/1. Possible CV this AM, C/w amiodarone. Holding diuretics.    PAST MEDICAL & SURGICAL HISTORY:  Temporal arteritis  Atrial fibrillation  Acute CHF    PSH: Denies   Social Hx: Denies tobacco / etoh / drugs   Family Hx: Denies pertinent hx  (18 Mar 2024 23:17)    Allergies: Penicillin (Swelling), Intolerances    MEDICATIONS  (STANDING):  aMIOdarone    Tablet 200 milliGRAM(s) Oral two times a day  atorvastatin 80 milliGRAM(s) Oral at bedtime  dextrose 5%. 1000 milliLiter(s) (100 mL/Hr) IV Continuous <Continuous>  dextrose 5%. 1000 milliLiter(s) (50 mL/Hr) IV Continuous <Continuous>  dextrose 50% Injectable 25 Gram(s) IV Push once  dextrose 50% Injectable 12.5 Gram(s) IV Push once  dextrose 50% Injectable 25 Gram(s) IV Push once  folic acid 1 milliGRAM(s) Oral daily  glucagon  Injectable 1 milliGRAM(s) IntraMuscular once  insulin lispro (ADMELOG) corrective regimen sliding scale   SubCutaneous three times a day before meals  insulin lispro (ADMELOG) corrective regimen sliding scale.   SubCutaneous at bedtime  pantoprazole    Tablet 40 milliGRAM(s) Oral before breakfast  predniSONE   Tablet 30 milliGRAM(s) Oral daily  rivaroxaban 15 milliGRAM(s) Oral with dinner  timolol 0.5% Solution 1 Drop(s) Both EYES daily    MEDICATIONS  (PRN):  acetaminophen     Tablet .. 650 milliGRAM(s) Oral every 6 hours PRN Temp greater or equal to 38C (100.4F), Mild Pain (1 - 3)  aluminum hydroxide/magnesium hydroxide/simethicone Suspension 30 milliLiter(s) Oral every 4 hours PRN Dyspepsia  dextrose Oral Gel 15 Gram(s) Oral once PRN Blood Glucose LESS THAN 70 milliGRAM(s)/deciliter  melatonin 3 milliGRAM(s) Oral at bedtime PRN Insomnia  ondansetron Injectable 4 milliGRAM(s) IV Push every 8 hours PRN Nausea and/or Vomiting    HOME MEDICATIONS:  Farxiga 10 mg oral tablet: 1 tab(s) orally once a day (28 Mar 2024 22:52)  folic acid 1 mg oral tablet: 1 tab(s) orally once a day (28 Mar 2024 22:52)  ipratropium 21 mcg/inh (0.03%) nasal spray: 2 spray(s) intranasally once a day (28 Mar 2024 22:52)  metFORMIN 500 mg oral tablet, extended release: 1 tab(s) orally once a day *** patient stopped metformin due to diarrhea*** (29 Mar 2024 15:12)  pantoprazole 40 mg oral delayed release tablet: 1 tab(s) orally once a day (28 Mar 2024 22:52)  potassium chloride 10 mEq oral tablet, extended release: 1 tab(s) orally 2 times a day (28 Mar 2024 22:52)  rosuvastatin 40 mg oral tablet: 1 tab(s) orally once a day (28 Mar 2024 22:52)  timolol maleate 0.5% ophthalmic solution: 1 drop(s) in each eye once a day (28 Mar 2024 22:52)  Xarelto 15 mg oral tablet: 1 tab(s) orally once a day (28 Mar 2024 22:52)    PHYSICAL EXAM:  T(C): 36.3 (01 Apr 2024 07:52), Max: 36.9 (31 Mar 2024 20:41)  T(F): 97.4 (01 Apr 2024 07:52), Max: 98.4 (31 Mar 2024 20:41)  HR: 87 (01 Apr 2024 07:52) (87 - 94)  BP: 116/78 (01 Apr 2024 07:52) (100/64 - 116/78)  RR: 19 (01 Apr 2024 07:52) (18 - 19)  SpO2: 98% (01 Apr 2024 07:52) (98% - 98%)    Parameters below as of 01 Apr 2024 07:52  Patient On (Oxygen Delivery Method): room air    GENERAL: NAD, well-groomed, well-developed,  HEENT - NC/AT, pupils equal and reactive to light,  ; Moist mucous membranes, Good dentition, No lesions  NECK: Supple, No JVD  CHEST/LUNG: Clear to auscultation bilaterally; No rales, rhonchi, wheezing  HEART: Irregularly irregular rate and rhythm; No murmurs, rubs, or gallops  ABDOMEN: Soft, Nontender, Nondistended; Bowel sounds present  EXTREMITIES:  2+ Peripheral Pulses, No clubbing, cyanosis, or edema  NEURO:  No Focal deficits, sensory and motor intact    LABS: All Labs Reviewed:                        11.2   12.29 )-----------( 201      ( 31 Mar 2024 06:34 )             34.6     04-01    139  |  113<H>  |  32<H>  ----------------------------<  189<H>  4.7   |  20<L>  |  1.13    Ca    8.3<L>      01 Apr 2024 06:04  Phos  2.8     04-01  Mg     2.0     04-01    Troponin: 16.50 ng/L    CARDIAC TESTING:    < from: TTE Echo Complete w/o Contrast w/ Doppler (03.19.24 @ 09:41) >   Left ventricle systolic function appears preserved in the presence of a   cardiac arrhythmia. Left ventricle size and structure are within normal   limitations. Visual estimation of left ventricle ejection fraction is>50  %.   The left atrium is moderately dilated.   The right atrium appears moderately dilated.   The right ventricle exhibits mild dilation, mild diffuse hypokinesis, and   mild depression of contractility.   Mild aortic sclerosis is present with normal valvular opening.   Minimal mitral annular calcification is present.   Mild (1+) mitral regurgitation is present.   Moderate to severe (3+) tricuspid valve regurgitation is present.   Mild pulmonic valvular regurgitation (1+) is present.    RADIOLOGY:    CT Abdomen and Pelvis w/ IV Cont (03.28.24 @ 23:36) : No evidence of small bowel obstruction or active bowel inflammation. Stable subcutaneous hematoma overlying the right lateral hip.    Xray Chest 1 View- PORTABLE-Urgent (03.28.24 @ 16:51) Persistent large retrocaval mass.                
Bradycardia, preop clearance
R/o acute cholecystitis
Shock
abd pain
shahriar

## 2024-09-04 ENCOUNTER — NON-APPOINTMENT (OUTPATIENT)
Age: 69
End: 2024-09-04

## 2024-09-04 ENCOUNTER — APPOINTMENT (OUTPATIENT)
Dept: INTERNAL MEDICINE | Facility: CLINIC | Age: 69
End: 2024-09-04
Payer: MEDICARE

## 2024-09-04 VITALS
HEIGHT: 67 IN | WEIGHT: 159 LBS | RESPIRATION RATE: 14 BRPM | HEART RATE: 70 BPM | SYSTOLIC BLOOD PRESSURE: 112 MMHG | TEMPERATURE: 98 F | OXYGEN SATURATION: 99 % | BODY MASS INDEX: 24.96 KG/M2 | DIASTOLIC BLOOD PRESSURE: 70 MMHG

## 2024-09-04 DIAGNOSIS — Z23 ENCOUNTER FOR IMMUNIZATION: ICD-10-CM

## 2024-09-04 DIAGNOSIS — R73.03 PREDIABETES.: ICD-10-CM

## 2024-09-04 DIAGNOSIS — E55.9 VITAMIN D DEFICIENCY, UNSPECIFIED: ICD-10-CM

## 2024-09-04 DIAGNOSIS — Z00.00 ENCOUNTER FOR GENERAL ADULT MEDICAL EXAMINATION W/OUT ABNORMAL FINDINGS: ICD-10-CM

## 2024-09-04 DIAGNOSIS — I10 ESSENTIAL (PRIMARY) HYPERTENSION: ICD-10-CM

## 2024-09-04 LAB
ALBUMIN SERPL ELPH-MCNC: 4.2 G/DL
ALP BLD-CCNC: 81 U/L
ALT SERPL-CCNC: 19 U/L
ANION GAP SERPL CALC-SCNC: 12 MMOL/L
APPEARANCE: CLEAR
AST SERPL-CCNC: 20 U/L
BACTERIA: NEGATIVE /HPF
BASOPHILS # BLD AUTO: 0.05 K/UL
BASOPHILS NFR BLD AUTO: 0.9 %
BILIRUB SERPL-MCNC: 0.4 MG/DL
BILIRUBIN URINE: NEGATIVE
BLOOD URINE: ABNORMAL
BUN SERPL-MCNC: 11 MG/DL
CALCIUM SERPL-MCNC: 8.9 MG/DL
CAST: 0 /LPF
CHLORIDE SERPL-SCNC: 98 MMOL/L
CHOLEST SERPL-MCNC: 188 MG/DL
CK SERPL-CCNC: 127 U/L
CO2 SERPL-SCNC: 24 MMOL/L
COLOR: YELLOW
CREAT SERPL-MCNC: 0.71 MG/DL
EGFR: 101 ML/MIN/1.73M2
EOSINOPHIL # BLD AUTO: 0.13 K/UL
EOSINOPHIL NFR BLD AUTO: 2.4 %
EPITHELIAL CELLS: 0 /HPF
ESTIMATED AVERAGE GLUCOSE: 123 MG/DL
GLUCOSE QUALITATIVE U: NEGATIVE MG/DL
GLUCOSE SERPL-MCNC: 89 MG/DL
HBA1C MFR BLD HPLC: 5.9 %
HCT VFR BLD CALC: 42 %
HDLC SERPL-MCNC: 52 MG/DL
HGB BLD-MCNC: 13.6 G/DL
IMM GRANULOCYTES NFR BLD AUTO: 0.5 %
KETONES URINE: NEGATIVE MG/DL
LDLC SERPL CALC-MCNC: 124 MG/DL
LEUKOCYTE ESTERASE URINE: NEGATIVE
LYMPHOCYTES # BLD AUTO: 1.93 K/UL
LYMPHOCYTES NFR BLD AUTO: 35 %
MAN DIFF?: NORMAL
MCHC RBC-ENTMCNC: 28.1 PG
MCHC RBC-ENTMCNC: 32.4 GM/DL
MCV RBC AUTO: 86.8 FL
MICROSCOPIC-UA: NORMAL
MONOCYTES # BLD AUTO: 0.57 K/UL
MONOCYTES NFR BLD AUTO: 10.3 %
NEUTROPHILS # BLD AUTO: 2.81 K/UL
NEUTROPHILS NFR BLD AUTO: 50.9 %
NITRITE URINE: NEGATIVE
NONHDLC SERPL-MCNC: 135 MG/DL
PH URINE: 7.5
PLATELET # BLD AUTO: 206 K/UL
POTASSIUM SERPL-SCNC: 4.2 MMOL/L
PROT SERPL-MCNC: 6.9 G/DL
PROTEIN URINE: NEGATIVE MG/DL
PSA SERPL-MCNC: 0.68 NG/ML
RBC # BLD: 4.84 M/UL
RBC # FLD: 12.5 %
RED BLOOD CELLS URINE: 1 /HPF
REVIEW: NORMAL
SODIUM SERPL-SCNC: 134 MMOL/L
SPECIFIC GRAVITY URINE: 1.01
TRIGL SERPL-MCNC: 61 MG/DL
UROBILINOGEN URINE: 0.2 MG/DL
WBC # FLD AUTO: 5.52 K/UL
WHITE BLOOD CELLS URINE: 0 /HPF

## 2024-09-04 PROCEDURE — 93000 ELECTROCARDIOGRAM COMPLETE: CPT

## 2024-09-04 PROCEDURE — G0439: CPT

## 2024-09-04 NOTE — PHYSICAL EXAM
[No Acute Distress] : no acute distress [Well Nourished] : well nourished [Well Developed] : well developed [Well-Appearing] : well-appearing [Normal Voice/Communication] : normal voice/communication [Normal Sclera/Conjunctiva] : normal sclera/conjunctiva [PERRL] : pupils equal round and reactive to light [EOMI] : extraocular movements intact [Normal Outer Ear/Nose] : the outer ears and nose were normal in appearance [Normal Oropharynx] : the oropharynx was normal [Normal TMs] : both tympanic membranes were normal [No JVD] : no jugular venous distention [No Lymphadenopathy] : no lymphadenopathy [Supple] : supple [Thyroid Normal, No Nodules] : the thyroid was normal and there were no nodules present [No Respiratory Distress] : no respiratory distress  [No Accessory Muscle Use] : no accessory muscle use [Clear to Auscultation] : lungs were clear to auscultation bilaterally [Normal Rate] : normal rate  [Regular Rhythm] : with a regular rhythm [Normal S1, S2] : normal S1 and S2 [No Murmur] : no murmur heard [No Carotid Bruits] : no carotid bruits [No Abdominal Bruit] : a ~M bruit was not heard ~T in the abdomen [No Varicosities] : no varicosities [Pedal Pulses Present] : the pedal pulses are present [No Edema] : there was no peripheral edema [No Palpable Aorta] : no palpable aorta [No Extremity Clubbing/Cyanosis] : no extremity clubbing/cyanosis [Soft] : abdomen soft [Non Tender] : non-tender [Non-distended] : non-distended [No HSM] : no HSM [Normal Bowel Sounds] : normal bowel sounds [Normal Supraclavicular Nodes] : no supraclavicular lymphadenopathy [Normal Posterior Cervical Nodes] : no posterior cervical lymphadenopathy [Normal Anterior Cervical Nodes] : no anterior cervical lymphadenopathy [No CVA Tenderness] : no CVA  tenderness [No Spinal Tenderness] : no spinal tenderness [No Joint Swelling] : no joint swelling [Grossly Normal Strength/Tone] : grossly normal strength/tone [No Rash] : no rash [Coordination Grossly Intact] : coordination grossly intact [No Focal Deficits] : no focal deficits [Normal Gait] : normal gait [Deep Tendon Reflexes (DTR)] : deep tendon reflexes were 2+ and symmetric [Speech Grossly Normal] : speech grossly normal [Memory Grossly Normal] : memory grossly normal [Normal Affect] : the affect was normal [Alert and Oriented x3] : oriented to person, place, and time [Normal Mood] : the mood was normal [Normal Insight/Judgement] : insight and judgment were intact [Normal Appearance] : normal in appearance [No Masses] : no palpable masses [No Hernias] : no hernias [Normal Sphincter Tone] : normal sphincter tone [No Mass] : no mass [Penis Abnormality] : normal uncircumcised penis [Scrotum] : the scrotum was normal [Testes Tenderness] : no tenderness of the testes [Testes Mass (___cm)] : there were no testicular masses [Prostate Enlargement] : the prostate was not enlarged [Prostate Tenderness] : the prostate was not tender [No Prostate Nodules] : no prostate nodules [Normal Axillary Nodes] : no axillary lymphadenopathy [Normal Inguinal Nodes] : no inguinal lymphadenopathy [Normal Femoral Nodes] : no femoral lymphadenopathy

## 2024-09-04 NOTE — HEALTH RISK ASSESSMENT
[Good] : ~his/her~  mood as  good [Never (0 pts)] : Never (0 points) [No] : In the past 12 months have you used drugs other than those required for medical reasons? No [No falls in past year] : Patient reported no falls in the past year [Little interest or pleasure doing things] : 1) Little interest or pleasure doing things [Feeling down, depressed, or hopeless] : 2) Feeling down, depressed, or hopeless [0] : 2) Feeling down, depressed, or hopeless: Not at all (0) [PHQ-2 Negative - No further assessment needed] : PHQ-2 Negative - No further assessment needed [Never] : Never [None] : None [Alone] : lives alone [Retired] : retired [] :  [Feels Safe at Home] : Feels safe at home [Fully functional (bathing, dressing, toileting, transferring, walking, feeding)] : Fully functional (bathing, dressing, toileting, transferring, walking, feeding) [Fully functional (using the telephone, shopping, preparing meals, housekeeping, doing laundry, using] : Fully functional and needs no help or supervision to perform IADLs (using the telephone, shopping, preparing meals, housekeeping, doing laundry, using transportation, managing medications and managing finances) [Smoke Detector] : smoke detector [Carbon Monoxide Detector] : carbon monoxide detector [Safety elements used in home] : safety elements used in home [Seat Belt] :  uses seat belt [Sunscreen] : uses sunscreen [NFH2Qcdkg] : 0 [Change in mental status noted] : No change in mental status noted [Reports changes in hearing] : Reports no changes in hearing [Reports changes in vision] : Reports no changes in vision [Travel to Developing Areas] : does not  travel to developing areas [TB Exposure] : is not being exposed to tuberculosis [Caregiver Concerns] : does not have caregiver concerns

## 2024-09-04 NOTE — PLAN
[FreeTextEntry1] : continue medications Telmisartan chronic medical conditions HTN prediabetes  Further instructions pending lab results  Declines Flu and Prevnar vaccine  Advised to get colonoscopy  he declines despite brother having Colon Cancer order Cologuard

## 2024-09-04 NOTE — HISTORY OF PRESENT ILLNESS
[FreeTextEntry1] : annual physical  [de-identified] : DIONICIO PACKER is a 69-year-old M who presents today for his annual physical. Pt has a hx of HTN and prediabetes. Pt has never had a colonoscopy. Pt has no new complaints.

## 2024-09-04 NOTE — END OF VISIT
[FreeTextEntry3] : "I, Brody Wayne, personally scribed the services dictated to me by Dr. Luigi Candelario MD in this documentation on 09/04/2024"   "I Dr. Luigi Candelario MD, personally performed the services described in this documentation on 09/04/2024 for the patient as scribed by Brody Wayne in my presence. I have reviewed and verified that all the information is accurate and true."

## 2024-09-10 ENCOUNTER — APPOINTMENT (OUTPATIENT)
Dept: OTOLARYNGOLOGY | Facility: CLINIC | Age: 69
End: 2024-09-10
Payer: MEDICARE

## 2024-09-10 VITALS
DIASTOLIC BLOOD PRESSURE: 78 MMHG | HEIGHT: 67 IN | SYSTOLIC BLOOD PRESSURE: 136 MMHG | BODY MASS INDEX: 24.96 KG/M2 | HEART RATE: 79 BPM | WEIGHT: 159 LBS

## 2024-09-10 DIAGNOSIS — H90.3 SENSORINEURAL HEARING LOSS, BILATERAL: ICD-10-CM

## 2024-09-10 DIAGNOSIS — H61.23 IMPACTED CERUMEN, BILATERAL: ICD-10-CM

## 2024-09-10 PROCEDURE — 99213 OFFICE O/P EST LOW 20 MIN: CPT | Mod: 25

## 2024-09-10 NOTE — PHYSICAL EXAM
[de-identified] : Bilateral cerumen impaction [Midline] : trachea located in midline position [Normal] : no rashes

## 2024-09-10 NOTE — ASSESSMENT
[FreeTextEntry1] : Vincenzo Eduardo presents for evaluation. Bilateral cerumen impaction was removed. He has history of right profound SNHL, thought to be from childhood. He has never had imaging. He defers audiogram today but will come back in 2 mo. At that time, will obtain audio and based on results, will order MRI. He defers cochlear implant evaluation.  - f/u in 2 mo

## 2024-09-10 NOTE — HISTORY OF PRESENT ILLNESS
[de-identified] : Vincenzo Eduardo is a 70 yo male with hx asymmetric SNHL since childhood who presents for evaluation of right ear discomfort. He notes sensation fo something in right ear over the last two days, improved with tylenol. He denies otorrhea, tinnitus, vertigo, or change in hearing. No recent fevers. He denies family history of hearing loss. He is unsure of an infection led to his hearing loss in childhood. Denies trauma. His previous audiogram showed profound right SNHL. He has never had imaging but does not have time for audiogram today.

## 2024-09-20 ENCOUNTER — APPOINTMENT (OUTPATIENT)
Dept: OTOLARYNGOLOGY | Facility: CLINIC | Age: 69
End: 2024-09-20

## 2024-09-20 LAB — 25(OH)D3 SERPL-MCNC: 13.5 NG/ML

## 2024-11-08 NOTE — H&P PST ADULT - FALL HARM RISK - FACTORS NURSING JUDGEMENT
Discharge Planning:  PLAN: Return to [facility] with IV abx. PICC placed. Will need BLS transport.   AMBROSIO Hinkle [facility]  [phone]  Electronically signed by AMBROSIO Wells on 11/8/2024 at 9:24 AM     Yes

## 2025-01-23 NOTE — H&P PST ADULT - GENERAL
[facility] [address] [id number] [phone]       [facility]  [id number]  1998    ORTHOPAEDIC SURGERY OUTPATIENT NOTE  1/23/2025      HISTORY:  26 y.o. male  is 6 months postop visit status post right shoulder arthroscopy with labral pair 7/26/2024. HE is doing well. He is pleased with his shoulder. He notes some discomfort with throwing.  His pain level is a 1/10 and sane score is %    History reviewed. No pertinent past medical history.    Past Surgical History:   Procedure Laterality Date    FL INJECTION RIGHT SHOULDER (ARTHROGRAM)  6/17/2024    [place] SURGICAL ARTHROSCOPY SHOULDER CAPSULORRHAPHY Right 7/26/2024    Procedure: ARTHROSCOPY SHOULDER- right with labral repair, possble remplissage and all necessary procedures;  Surgeon: Lianna Floyd DO;  Location:  MAIN OR;  Service: Orthopedics       Social History     Socioeconomic History    Marital status: /Civil Union     Spouse name: Not on file    Number of children: Not on file    Years of education: Not on file    Highest education level: Not on file   Occupational History    Not on file   Tobacco Use    Smoking status: Never    Smokeless tobacco: Never   Vaping Use    Vaping status: Never Used   Substance and Sexual Activity    Alcohol use: Yes     Comment: social    Drug use: Never    Sexual activity: Not on file   Other Topics Concern    Not on file   Social History Narrative    Not on file     Social Drivers of Health     Financial Resource Strain: Not on file   Food Insecurity: Not on file   Transportation Needs: Not on file   Physical Activity: Not on file   Stress: Not on file   Social Connections: Unknown (6/18/2024)    Received from [employer]     How often do you feel lonely or isolated from those around you? (Adult - for ages 18 years and over): Not on file   Intimate Partner Violence: Not on file    Housing Stability: Not on file       Family History   Problem Relation Age of Onset    No Known Problems Mother     No Known Problems Father         Patient's Medications    No medications on file       No Known Allergies     There were no vitals taken for this visit.     REVIEW OF SYSTEMS:  Constitutional: Negative.    HEENT: Negative.    Respiratory: Negative.    Skin: Negative.    Neurological: Negative.    Psychiatric/Behavioral: Negative.  Musculoskeletal: Negative except for that mentioned in the HPI.    Gen: No acute distress, resting comfortably in bed  HEENT: Eyes clear, moist mucus membranes, hearing intact  Respiratory: No audible wheezing or stridor  Cardiovascular: Well Perfused peripherally, 2+ distal pulse  Abdomen: nondistended, no peritoneal signs     PHYSICAL EXAM:    RIGHT SHOULDER:    Appearance: Surgical incision well healed     Forward flexion:   180 degrees   Abduction:  180 degrees   External rotation at 90 degrees abduction:   90 degrees   Internal rotation at 90 degrees abduction:  90 degrees   External rotation at 0 degrees:   70 degrees   Internal rotation: T7     STRENGTH:  Forward flexion:  5/5   Abduction:  5/5   External rotation:  5/5   Internal rotation:  5/5      Radial/median/ulnar nerve intact  <2 sec cap refill       IMAGING:  Not taken today     ASSESSMENT AND PLAN:  26 y.o. male  is 6 months postop visit status post right shoulder arthroscopy with labral pair 7/26/2024      He is doing well. He has no restrictions. He has full range of motion, great strengthen and no instability. He is to follow up PRN.      Scribe Attestation      I,:  Wally Jimenez MA am acting as a scribe while in the presence of the attending physician.:       I,:  Lianna Floyd DO personally performed the services described in this documentation    as scribed in my presence.:              details…

## 2025-03-20 NOTE — ED ADULT TRIAGE NOTE - NSWEIGHTCALCTOOLDRUG_GEN_A_CORE
Yolanda called stating that she could not locate the Ct coronary calcium score through her mychart. Wanted to know if an order would need to be placed so she could move forward with getting this completed.      Please advise out to Yolanda to inform that order for Ct coronary calcium score has been placed.     used

## (undated) DEVICE — PLV-STRYKER LAPAROSCOPE 5MM 30 DEGREE: Type: DURABLE MEDICAL EQUIPMENT

## (undated) DEVICE — VENODYNE/SCD SLEEVE CALF LARGE

## (undated) DEVICE — CATH IV SAFE BC 20G X 1.16" (PINK)

## (undated) DEVICE — SOL IRR BAG NS 0.9% 3000ML

## (undated) DEVICE — BITE BLOCK ADULT 20 X 27MM (GREEN)

## (undated) DEVICE — ELCTR GROUNDING PAD ADULT COVIDIEN

## (undated) DEVICE — DRSG DERMABOND 0.7ML

## (undated) DEVICE — PLASTIC SOLUTION BOWL 160Z

## (undated) DEVICE — FOLEY HOLDER STATLOCK 2 WAY ADULT

## (undated) DEVICE — BRUSH COLONOSCOPY CYTOLOGY

## (undated) DEVICE — SYR LUER SLIP TIP 30CC

## (undated) DEVICE — LOK DVC RX AND BIOPSY

## (undated) DEVICE — SUCTION YANKAUER NO CONTROL VENT

## (undated) DEVICE — SOL IRR POUR H2O 1000ML

## (undated) DEVICE — SENSOR O2 FINGER ADULT

## (undated) DEVICE — SUT POLYSORB 0 30" GU-46

## (undated) DEVICE — VENODYNE/SCD SLEEVE CALF MEDIUM

## (undated) DEVICE — NDL HYPO REGULAR BEVEL 25G X 1.5" (BLUE)

## (undated) DEVICE — POSITIONER FOAM HEAD CRADLE (PINK)

## (undated) DEVICE — NDL INJ SCLERO INTERJECT 25G

## (undated) DEVICE — SNARE POLYP SENS SM 13MM 240CM

## (undated) DEVICE — PLV-SCD MACHINE: Type: DURABLE MEDICAL EQUIPMENT

## (undated) DEVICE — Device

## (undated) DEVICE — BRUSH CYTO RAP EXCHG 3MM

## (undated) DEVICE — CATH IV SAFE BC 22G X 1" (BLUE)

## (undated) DEVICE — PACK GENERAL LAPAROSCOPY

## (undated) DEVICE — NDL INJ SCLERO INTERJECT 23G

## (undated) DEVICE — INJ SYS RAP REFIL

## (undated) DEVICE — SHEARS COVIDIEN ENDO SHEAR 5MM X 31CM W UNIPOLAR CAUTERY

## (undated) DEVICE — BIOPSY FORCEP RADIAL JAW 4 STANDARD WITH NEEDLE

## (undated) DEVICE — GLV 7 PROTEXIS (WHITE)

## (undated) DEVICE — SNARE POLYP SENS 27MM 240CM

## (undated) DEVICE — SPHINCTEROTOME CLEVERCUT WIRE 25MM  2.8MM X 170CM

## (undated) DEVICE — WARMING BLANKET UPPER ADULT

## (undated) DEVICE — TROCAR COVIDIEN VERSAPORT BLADELESS OPTICAL 11MM STANDARD

## (undated) DEVICE — D HELP - CLEARVIEW CLEARIFY SYSTEM

## (undated) DEVICE — ONCORE 26 INSUFLATION DEVICE

## (undated) DEVICE — PLV-STRYKER LAPAROSCOPE 10MM 30 DEGREE: Type: DURABLE MEDICAL EQUIPMENT

## (undated) DEVICE — WARMING BLANKET LOWER ADULT

## (undated) DEVICE — SOL INJ NS 0.9% 500ML 2 PORT

## (undated) DEVICE — TUBING IV SET GRAVITY 3Y 100" MACRO

## (undated) DEVICE — SUT MONOCRYL 4-0 27" PS-2 UNDYED

## (undated) DEVICE — TUBING SUCTION 20FT

## (undated) DEVICE — GOWN XL W TOWEL

## (undated) DEVICE — PAPIL BILRTH II 6-5FRX0.035IN

## (undated) DEVICE — POSITIONER FOAM SLOTTED HEAD CRADLE (PINK)

## (undated) DEVICE — SNARE OVAL LOOP MICOR

## (undated) DEVICE — TROCAR COVIDIEN VERSAONE BLUNT TIP HASSAN 12MM

## (undated) DEVICE — DRAPE 3/4 SHEET W REINFORCEMENT 56X77"

## (undated) DEVICE — PACK IV START WITH CHG

## (undated) DEVICE — TROCAR COVIDIEN VERSAPORT BLADELESS OPTICAL 5MM STANDARD

## (undated) DEVICE — LITHOTRIPY BASKET TRAPEZOID 2.5CM

## (undated) DEVICE — ENDOCATCH 10MM SPECIMEN POUCH

## (undated) DEVICE — ELCTR BOVIE TIP BLADE INSULATED 2.75" EDGE

## (undated) DEVICE — SPONGE ENDO PEANUT 5MM

## (undated) DEVICE — TUBING STRYKER PNEUMOCLEAR HIGH FLOW

## (undated) DEVICE — SNARE POLYP MINI ACCUSNR 1.5 X 3CM

## (undated) DEVICE — DRAPE TOWEL BLUE 17" X 24"

## (undated) DEVICE — BLADE SCALPEL SAFETYLOCK #15

## (undated) DEVICE — PLV/PSP-SUCTION IRRIGATOR STRYKER: Type: DURABLE MEDICAL EQUIPMENT

## (undated) DEVICE — DRSG CURITY GAUZE SPONGE 4 X 4" 12-PLY

## (undated) DEVICE — TROCAR COVIDIEN VERSAONE FIXATION CANNULA 5MM